# Patient Record
Sex: FEMALE | Race: WHITE | NOT HISPANIC OR LATINO | Employment: OTHER | URBAN - METROPOLITAN AREA
[De-identification: names, ages, dates, MRNs, and addresses within clinical notes are randomized per-mention and may not be internally consistent; named-entity substitution may affect disease eponyms.]

---

## 2017-02-03 ENCOUNTER — OFFICE VISIT (OUTPATIENT)
Dept: INTERNAL MEDICINE | Facility: CLINIC | Age: 62
End: 2017-02-03
Payer: COMMERCIAL

## 2017-02-03 VITALS
RESPIRATION RATE: 16 BRPM | WEIGHT: 235.8 LBS | HEART RATE: 91 BPM | SYSTOLIC BLOOD PRESSURE: 112 MMHG | HEIGHT: 65 IN | BODY MASS INDEX: 39.29 KG/M2 | TEMPERATURE: 97.5 F | OXYGEN SATURATION: 98 % | DIASTOLIC BLOOD PRESSURE: 62 MMHG

## 2017-02-03 DIAGNOSIS — I10 BENIGN ESSENTIAL HYPERTENSION: Primary | ICD-10-CM

## 2017-02-03 DIAGNOSIS — Z23 NEED FOR TDAP VACCINATION: ICD-10-CM

## 2017-02-03 DIAGNOSIS — Z13.820 SCREENING FOR OSTEOPOROSIS: ICD-10-CM

## 2017-02-03 DIAGNOSIS — Z83.3 FAMILY HISTORY OF DIABETES MELLITUS: ICD-10-CM

## 2017-02-03 DIAGNOSIS — E78.5 HYPERLIPIDEMIA LDL GOAL <130: ICD-10-CM

## 2017-02-03 LAB — HBA1C MFR BLD: 6 % (ref 4.3–6)

## 2017-02-03 PROCEDURE — 80061 LIPID PANEL: CPT | Performed by: INTERNAL MEDICINE

## 2017-02-03 PROCEDURE — 80053 COMPREHEN METABOLIC PANEL: CPT | Performed by: INTERNAL MEDICINE

## 2017-02-03 PROCEDURE — 36415 COLL VENOUS BLD VENIPUNCTURE: CPT | Performed by: INTERNAL MEDICINE

## 2017-02-03 PROCEDURE — 83036 HEMOGLOBIN GLYCOSYLATED A1C: CPT | Performed by: INTERNAL MEDICINE

## 2017-02-03 PROCEDURE — 82043 UR ALBUMIN QUANTITATIVE: CPT | Performed by: INTERNAL MEDICINE

## 2017-02-03 PROCEDURE — 90471 IMMUNIZATION ADMIN: CPT | Performed by: INTERNAL MEDICINE

## 2017-02-03 PROCEDURE — 90715 TDAP VACCINE 7 YRS/> IM: CPT | Performed by: INTERNAL MEDICINE

## 2017-02-03 PROCEDURE — 99204 OFFICE O/P NEW MOD 45 MIN: CPT | Mod: 25 | Performed by: INTERNAL MEDICINE

## 2017-02-03 RX ORDER — LISINOPRIL AND HYDROCHLOROTHIAZIDE 12.5; 2 MG/1; MG/1
1 TABLET ORAL DAILY
Qty: 90 TABLET | Refills: 3 | Status: SHIPPED | OUTPATIENT
Start: 2017-02-03 | End: 2018-01-30

## 2017-02-03 RX ORDER — AMLODIPINE BESYLATE 10 MG/1
10 TABLET ORAL DAILY
COMMUNITY
End: 2017-02-03

## 2017-02-03 RX ORDER — LATANOPROST 50 UG/ML
1 SOLUTION/ DROPS OPHTHALMIC AT BEDTIME
COMMUNITY

## 2017-02-03 RX ORDER — LISINOPRIL AND HYDROCHLOROTHIAZIDE 12.5; 2 MG/1; MG/1
1 TABLET ORAL DAILY
COMMUNITY
End: 2017-02-03

## 2017-02-03 RX ORDER — ATORVASTATIN CALCIUM 10 MG/1
10 TABLET, FILM COATED ORAL DAILY
COMMUNITY
End: 2017-02-03

## 2017-02-03 RX ORDER — AMLODIPINE BESYLATE 10 MG/1
10 TABLET ORAL DAILY
Qty: 90 TABLET | Refills: 3 | Status: SHIPPED | OUTPATIENT
Start: 2017-02-03 | End: 2018-01-30

## 2017-02-03 RX ORDER — ATORVASTATIN CALCIUM 10 MG/1
10 TABLET, FILM COATED ORAL DAILY
Qty: 90 TABLET | Refills: 3 | Status: SHIPPED | OUTPATIENT
Start: 2017-02-03 | End: 2018-01-30

## 2017-02-03 NOTE — NURSING NOTE
"Chief Complaint   Patient presents with     [facility]     New pt from [facility]- need prescription refills        Initial /62 mmHg  Pulse 91  Temp(Src) 97.5  F (36.4  C) (Oral)  Resp 16  Ht 5' 4.75\" (1.645 m)  Wt 235 lb 12.8 oz (106.958 kg)  BMI 39.53 kg/m2  SpO2 98% Estimated body mass index is 39.53 kg/(m^2) as calculated from the following:    Height as of this encounter: 5' 4.75\" (1.645 m).    Weight as of this encounter: 235 lb 12.8 oz (106.958 kg).  BP completed using cuff size: sariah Sena CMA    Please abstract the following data from this visit with this patient into the appropriate field in Epic:    Colonoscopy done on this date: Winter 2012 (approximately), by this group: Ordered by Dr Chrissy Erickson with [facility], results were Negative.   Mammogram done on this date: 02/2016 (approximately), by this group: Ordered by Dr Valerie Chacon with [facility], results were Normal.   Pap smear done on this date: 02/2016 (approximately), by this group: Dr Chacon with [facility], results were Normal.         "

## 2017-02-03 NOTE — MR AVS SNAPSHOT
"              After Visit Summary   2/3/2017    Rosita Ramirez    MRN: [id number]           Patient Information     Date Of Birth          1955        Visit Information        Provider Department      2/3/2017 9:40 AM Ewelina Rodriguez MD [facility]        Today's Diagnoses     Benign essential hypertension    -  1     Hyperlipidemia LDL goal <130         Family history of diabetes mellitus         Screening for osteoporosis            Follow-ups after your visit        Future tests that were ordered for you today     Open Future Orders        Priority Expected Expires Ordered    DX Hip/Pelvis/Spine Routine  2/3/2018 2/3/2017            Who to contact     If you have questions or need follow up information about today's clinic visit or your schedule please contact [facility] directly at [phone].  Normal or non-critical lab and imaging results will be communicated to you by AppsFlyerhart, letter or phone within 4 business days after the clinic has received the results. If you do not hear from us within 7 days, please contact the clinic through Apps[employer] or phone. If you have a critical or abnormal lab result, we will notify you by phone as soon as possible.  Submit refill requests through [employer] or call your pharmacy and they will forward the refill request to us. Please allow 3 business days for your refill to be completed.          Additional Information About Your Visit        AppsFlyerharWITOI Information     [employer]alent lets you send messages to your doctor, view your test results, renew your prescriptions, schedule appointments and more. To sign up, go to [email or web address] . Click on \"Log in\" on the left side of the screen, which will take you to the Welcome page. Then click on \"Sign up Now\" on the right side of the page.     You will be asked to enter the access code listed below, as well as some personal information. Please follow the directions to create your username and " "password.     Your access code is: [email or web address]  Expires: 2017 10:20 AM     Your access code will  in 90 days. If you need help or a new code, please call your [place] clinic or [phone].        Care EveryWhere ID     This is your Care EveryWhere ID. This could be used by other organizations to access your [place] medical records  [id number]        Your Vitals Were     Pulse Temperature Respirations Height BMI (Body Mass Index) Pulse Oximetry    91 97.5  F (36.4  C) (Oral) 16 5' 4.75\" (1.645 m) 39.53 kg/m2 98%       Blood Pressure from Last 3 Encounters:   17 112/62    Weight from Last 3 Encounters:   17 235 lb 12.8 oz (106.958 kg)              We Performed the Following     Comprehensive metabolic panel     Hemoglobin A1c     Lipid Profile with reflex to direct LDL          Where to get your medicines      These medications were sent to [facility]/pharmacy #[id number] - [place], [place] - [address]  [address], [facility] [address]     Phone:  [phone]    - amLODIPine 10 MG tablet  - atorvastatin 10 MG tablet  - lisinopril-hydrochlorothiazide 20-12.5 MG per tablet  - metFORMIN 500 MG tablet       Primary Care Provider Office Phone # Fax #    Ewelina Rodriguez [phone]       [facility] [address] [facility] [address]  [facility] [address]        Thank you!     Thank you for choosing [facility]  for your care. Our goal is always to provide you with excellent care. Hearing back from our patients is one way we can continue to improve our services. Please take a few minutes to complete the written survey that you may receive in the mail after your visit with us. Thank you!             Your Updated Medication List - Protect others around you: Learn how to safely use, store and throw away your medicines at www.disposemymeds.org.          This list is accurate as of: 2/3/17 10:20 AM.  Always use your most recent med list.                   Brand Name " Dispense Instructions for use    amLODIPine 10 MG tablet    NORVASC    90 tablet    Take 1 tablet (10 mg) by mouth daily       atorvastatin 10 MG tablet    LIPITOR    90 tablet    Take 1 tablet (10 mg) by mouth daily       latanoprost 0.005 % ophthalmic solution    XALATAN     1 drop At Bedtime       lisinopril-hydrochlorothiazide 20-12.5 MG per tablet    PRINZIDE/ZESTORETIC    90 tablet    Take 1 tablet by mouth daily       metFORMIN 500 MG tablet    GLUCOPHAGE    180 tablet    Take 1 tablet (500 mg) by mouth 2 times daily (with meals)       timolol hemihydrate 0.5 % Soln ophthalmic solution    BETIMOL     1 drop 2 times daily

## 2017-02-03 NOTE — PROGRESS NOTES
Subjective:   Rosita Ramirez is a 61 year old female who presents as a new patient to this clinic.     [facility]  Current concerns:   No specific concerns.   She needs refills and labs.   She reports she has not been diagnosed with diabetes, put on metformin as a preventative due to family history, not clear if she is considered prediabetic.   She will continue to see her gynecologist for annual exam.   Past Medical History   Diagnosis Date     Hypertension      Hyperlipidemia        Current Outpatient Prescriptions   Medication Sig Dispense Refill     latanoprost (XALATAN) 0.005 % ophthalmic solution 1 drop At Bedtime       timolol hemihydrate (BETIMOL) 0.5 % SOLN ophthalmic solution 1 drop 2 times daily       amLODIPine (NORVASC) 10 MG tablet Take 1 tablet (10 mg) by mouth daily 90 tablet 3     atorvastatin (LIPITOR) 10 MG tablet Take 1 tablet (10 mg) by mouth daily 90 tablet 3     lisinopril-hydrochlorothiazide (PRINZIDE/ZESTORETIC) 20-12.5 MG per tablet Take 1 tablet by mouth daily 90 tablet 3     metFORMIN (GLUCOPHAGE) 500 MG tablet Take 1 tablet (500 mg) by mouth 2 times daily (with meals) 180 tablet 3     [DISCONTINUED] amLODIPine (NORVASC) 10 MG tablet Take 10 mg by mouth daily       [DISCONTINUED] metFORMIN (GLUCOPHAGE) 500 MG tablet Take 500 mg by mouth 2 times daily (with meals)       [DISCONTINUED] lisinopril-hydrochlorothiazide (PRINZIDE/ZESTORETIC) 20-12.5 MG per tablet Take 1 tablet by mouth daily       [DISCONTINUED] atorvastatin (LIPITOR) 10 MG tablet Take 10 mg by mouth daily         Past Surgical History   Procedure Laterality Date     Appendectomy       Extraction(s) dental         Family History   Problem Relation Age of Onset     Type 2 Diabetes Father      Heart Surgery Brother 50     Bypass      CEREBROVASCULAR DISEASE Maternal Grandmother      KIDNEY DISEASE Mother 80       Social History     Social History     Marital Status:      Spouse Name: N/A      "Number of Children: 3     Years of Education: N/A     Occupational History     Not on file.     Social History Main Topics     Smoking status: Never Smoker      Smokeless tobacco: Not on file     Alcohol Use: No     Drug Use: No     Sexual Activity: No     Other Topics Concern     Not on file     Social History Narrative     No narrative on file     ROS:   GENERAL: negative for, fever, chills, she has been losing weight deliberately  EYES: negative  ENT: negative  RESPIRATORY: No dyspnea on exertion  CARDIOVASCULAR: negative  GI: negative  : negative  MUSCULOSKELETAL: left knee stiff, some soreness  NEUROLOGIC: negative  SKIN: negative  ENDOCRINE: negative    Objective:  Patient alert in NAD  /62 mmHg  Pulse 91  Temp(Src) 97.5  F (36.4  C) (Oral)  Resp 16  Ht 5' 4.75\" (1.645 m)  Wt 235 lb 12.8 oz (106.958 kg)  BMI 39.53 kg/m2  SpO2 98%     Neck: no nodes, no thyromegaly  Lungs: clear  CV: normal S1, S2 without murmur, S3 or S4.   Abdomen: Bowel sounds normal, soft, nontender. No hepatosplenomegaly. No masses.   No edema  Pulses full, no carotid bruits         ASSESSMENT:   (I10) Benign essential hypertension  (primary encounter diagnosis)  Comment: controlled, continue meds, lab  Plan: amLODIPine (NORVASC) 10 MG tablet,         lisinopril-hydrochlorothiazide         (PRINZIDE/ZESTORETIC) 20-12.5 MG per tablet,         Comprehensive metabolic panel            (E78.5) Hyperlipidemia LDL goal <130  Comment: check labs  Plan: atorvastatin (LIPITOR) 10 MG tablet,         Comprehensive metabolic panel, Lipid Profile         with reflex to direct LDL            (Z83.3) Family history of diabetes mellitus  Comment: check labs  Plan: metFORMIN (GLUCOPHAGE) 500 MG tablet,         Comprehensive metabolic panel, Hemoglobin A1c            (Z13.820) Screening for osteoporosis  Comment: due  Plan: DX Hip/Pelvis/Spine            (Z23) Need for Tdap vaccination  Comment: due  Plan: TDAP (ADACEL AGES 11-64)       "     Will get records sent.     Ewelina Rodriguez MD  [facility]          Please abstract the following data from this visit with this patient into the appropriate field in Epic:    Colonoscopy done on this date: Winter 2012 (approximately), by this group: Ordered by Dr Chrissy Erickson with [facility], results were Negative.   Mammogram done on this date: 02/2016 (approximately), by this group: Ordered by Dr Valerie Chacon with [facility], results were Normal.   Pap smear done on this date: 02/2016 (approximately), by this group: Dr Chacon with [facility], results were Normal.

## 2017-02-03 NOTE — Clinical Note
Please abstract the following data from this visit with this patient into the appropriate field in Epic:  Colonoscopy done on this date: Winter 2012 (approximately), by this group: Ordered by Dr Chrissy Erickson with [facility], results were Negative.  Mammogram done on this date: 02/2016 (approximately), by this group: Ordered by Dr Valerie Chacon with [facility], results were Normal.  Pap smear done on this date: 02/2016 (approximately), by this group: Dr Chaocn with [facility], results were Normal.

## 2017-02-04 LAB
ALBUMIN SERPL-MCNC: 3.9 G/DL (ref 3.4–5)
ALP SERPL-CCNC: 76 U/L (ref 40–150)
ALT SERPL W P-5'-P-CCNC: 35 U/L (ref 0–50)
ANION GAP SERPL CALCULATED.3IONS-SCNC: 11 MMOL/L (ref 3–14)
AST SERPL W P-5'-P-CCNC: 17 U/L (ref 0–45)
BILIRUB SERPL-MCNC: 0.6 MG/DL (ref 0.2–1.3)
BUN SERPL-MCNC: 13 MG/DL (ref 7–30)
CALCIUM SERPL-MCNC: 9.4 MG/DL (ref 8.5–10.1)
CHLORIDE SERPL-SCNC: 103 MMOL/L (ref 94–109)
CHOLEST SERPL-MCNC: 149 MG/DL
CO2 SERPL-SCNC: 27 MMOL/L (ref 20–32)
CREAT SERPL-MCNC: 0.59 MG/DL (ref 0.52–1.04)
CREAT UR-MCNC: 60 MG/DL
GFR SERPL CREATININE-BSD FRML MDRD: ABNORMAL ML/MIN/1.7M2
GLUCOSE SERPL-MCNC: 105 MG/DL (ref 70–99)
HDLC SERPL-MCNC: 84 MG/DL
LDLC SERPL CALC-MCNC: 51 MG/DL
MICROALBUMIN UR-MCNC: <5 MG/L
MICROALBUMIN/CREAT UR: NORMAL MG/G CR (ref 0–25)
NONHDLC SERPL-MCNC: 65 MG/DL
POTASSIUM SERPL-SCNC: 4.3 MMOL/L (ref 3.4–5.3)
PROT SERPL-MCNC: 7.9 G/DL (ref 6.8–8.8)
SODIUM SERPL-SCNC: 141 MMOL/L (ref 133–144)
TRIGL SERPL-MCNC: 70 MG/DL

## 2017-03-09 ENCOUNTER — TELEPHONE (OUTPATIENT)
Dept: BONE DENSITY | Facility: CLINIC | Age: 62
End: 2017-03-09

## 2017-06-05 ENCOUNTER — TELEPHONE (OUTPATIENT)
Dept: INTERNAL MEDICINE | Facility: CLINIC | Age: 62
End: 2017-06-05

## 2017-06-05 DIAGNOSIS — Z83.3 FAMILY HISTORY OF DIABETES MELLITUS: ICD-10-CM

## 2017-06-05 NOTE — TELEPHONE ENCOUNTER
Sorry, at the first visit the nurse did not put in the correct dose. In future, please cue up corrected dose on prescriptions. Sent.

## 2017-06-05 NOTE — TELEPHONE ENCOUNTER
Patient calls in stating she takes Metformin 2 tablets twice daily, previous Rx was written for 1 tablet twice daily. Patient is out of medication after today.    Does not monitor BG states is on Metformin for preventative d/t Family HX.  Patient asks for a 1 year supply.  Please call pt when Rx is sent to pharmacy.  Provider please review and advise.

## 2018-01-30 ENCOUNTER — OFFICE VISIT (OUTPATIENT)
Dept: INTERNAL MEDICINE | Facility: CLINIC | Age: 63
End: 2018-01-30
Payer: COMMERCIAL

## 2018-01-30 VITALS
HEART RATE: 82 BPM | OXYGEN SATURATION: 99 % | DIASTOLIC BLOOD PRESSURE: 68 MMHG | RESPIRATION RATE: 16 BRPM | BODY MASS INDEX: 40.38 KG/M2 | WEIGHT: 240.8 LBS | SYSTOLIC BLOOD PRESSURE: 108 MMHG | TEMPERATURE: 98.7 F

## 2018-01-30 DIAGNOSIS — I10 BENIGN ESSENTIAL HYPERTENSION: Primary | ICD-10-CM

## 2018-01-30 DIAGNOSIS — E66.01 MORBID OBESITY (H): ICD-10-CM

## 2018-01-30 DIAGNOSIS — R73.09 ABNORMAL BLOOD SUGAR: ICD-10-CM

## 2018-01-30 DIAGNOSIS — Z12.31 ENCOUNTER FOR SCREENING MAMMOGRAM FOR BREAST CANCER: ICD-10-CM

## 2018-01-30 DIAGNOSIS — Z11.59 SCREENING FOR VIRAL DISEASE: ICD-10-CM

## 2018-01-30 DIAGNOSIS — L82.1 SEBORRHEIC KERATOSIS: ICD-10-CM

## 2018-01-30 DIAGNOSIS — E78.5 HYPERLIPIDEMIA LDL GOAL <130: ICD-10-CM

## 2018-01-30 LAB — HBA1C MFR BLD: 5.9 % (ref 4.3–6)

## 2018-01-30 PROCEDURE — 82043 UR ALBUMIN QUANTITATIVE: CPT | Performed by: INTERNAL MEDICINE

## 2018-01-30 PROCEDURE — 36415 COLL VENOUS BLD VENIPUNCTURE: CPT | Performed by: INTERNAL MEDICINE

## 2018-01-30 PROCEDURE — 80048 BASIC METABOLIC PNL TOTAL CA: CPT | Performed by: INTERNAL MEDICINE

## 2018-01-30 PROCEDURE — 80061 LIPID PANEL: CPT | Performed by: INTERNAL MEDICINE

## 2018-01-30 PROCEDURE — 86803 HEPATITIS C AB TEST: CPT | Performed by: INTERNAL MEDICINE

## 2018-01-30 PROCEDURE — 83036 HEMOGLOBIN GLYCOSYLATED A1C: CPT | Performed by: INTERNAL MEDICINE

## 2018-01-30 PROCEDURE — 99214 OFFICE O/P EST MOD 30 MIN: CPT | Performed by: INTERNAL MEDICINE

## 2018-01-30 RX ORDER — AMLODIPINE BESYLATE 10 MG/1
10 TABLET ORAL DAILY
Qty: 90 TABLET | Refills: 3 | Status: SHIPPED | OUTPATIENT
Start: 2018-01-30 | End: 2019-01-24

## 2018-01-30 RX ORDER — LISINOPRIL AND HYDROCHLOROTHIAZIDE 12.5; 2 MG/1; MG/1
1 TABLET ORAL DAILY
Qty: 90 TABLET | Refills: 3 | Status: SHIPPED | OUTPATIENT
Start: 2018-01-30 | End: 2019-01-24

## 2018-01-30 RX ORDER — ATORVASTATIN CALCIUM 10 MG/1
10 TABLET, FILM COATED ORAL DAILY
Qty: 90 TABLET | Refills: 3 | Status: SHIPPED | OUTPATIENT
Start: 2018-01-30 | End: 2019-02-01

## 2018-01-30 NOTE — PROGRESS NOTES
SUBJECTIVE:   Rosita Ramirez is a 62 year old female who presents to clinic today for the following health issues:      Hyperlipidemia Follow-Up      Rate your low fat/cholesterol diet?: fair    Taking statin?  Yes, no muscle aches from statin    Other lipid medications/supplements?:  Fish oil/Omega 3, dose  without side effects    Hypertension Follow-up      Outpatient blood pressures are not being checked.    Low Salt Diet: not monitoring salt      Amount of exercise or physical activity: None    Problems taking medications regularly: No    Medication side effects: none    Diet: regular (no restrictions)      Other problems:   1. Abnormal sugar: on metformin, tolerating well  2. Morbid obesity; weight increased    Current Concerns:   Skin lesion right breast present a long time, itches at times.     Patient Active Problem List   Diagnosis     Benign essential hypertension     Hyperlipidemia LDL goal <130     Family history of diabetes mellitus       Current Outpatient Prescriptions   Medication Sig Dispense Refill     metFORMIN (GLUCOPHAGE) 500 MG tablet Take 2 tablets (1,000 mg) by mouth 2 times daily (with meals) 360 tablet 3     latanoprost (XALATAN) 0.005 % ophthalmic solution 1 drop At Bedtime       timolol hemihydrate (BETIMOL) 0.5 % SOLN ophthalmic solution 1 drop 2 times daily       amLODIPine (NORVASC) 10 MG tablet Take 1 tablet (10 mg) by mouth daily 90 tablet 3     atorvastatin (LIPITOR) 10 MG tablet Take 1 tablet (10 mg) by mouth daily 90 tablet 3     lisinopril-hydrochlorothiazide (PRINZIDE/ZESTORETIC) 20-12.5 MG per tablet Take 1 tablet by mouth daily 90 tablet 3         Social History   Substance Use Topics     Smoking status: Never Smoker     Smokeless tobacco: Never Used     Alcohol use No          Reviewed and updated as needed this visit by clinical staff       Reviewed and updated as needed this visit by Provider         ROS:  No fever, chills, no dyspnea on exertion, no chest pain,  "palpitations, PND, orthopnea, edema, syncope, headache, abdominal pain     OBJECTIVE:     /68  Pulse 82  Temp 98.7  F (37.1  C) (Oral)  Resp 16  Wt 240 lb 12.8 oz (109.2 kg)  SpO2 99%  BMI 40.38 kg/m2  Body mass index is 40.38 kg/(m^2).    Right breast with large seb k  Lungs: clear  CV: normal S1, S2 without murmur, S3 or S4.   Abdomen: Bowel sounds normal, soft, nontender. No hepatosplenomegaly. No masses.   No edema  Pulses full, no carotid bruits        ASSESSMENT/PLAN:         BMI:   Estimated body mass index is 40.38 kg/(m^2) as calculated from the following:    Height as of 2/3/17: 5' 4.75\" (1.645 m).    Weight as of this encounter: 240 lb 12.8 oz (109.2 kg).   Weight management plan: Discussed healthy diet and exercise guidelines and patient will follow up in 6 months in clinic to re-evaluate.      1. Benign essential hypertension  Well controlled  - Basic metabolic panel  - Albumin Random Urine Quantitative with Creat Ratio  - amLODIPine (NORVASC) 10 MG tablet; Take 1 tablet (10 mg) by mouth daily  Dispense: 90 tablet; Refill: 3  - lisinopril-hydrochlorothiazide (PRINZIDE/ZESTORETIC) 20-12.5 MG per tablet; Take 1 tablet by mouth daily  Dispense: 90 tablet; Refill: 3    2. Morbid obesity (H)  Increased weight, discussed how to make diet and exercise changes    3. Hyperlipidemia LDL goal <130  Recheck due  - Lipid panel reflex to direct LDL Fasting  - atorvastatin (LIPITOR) 10 MG tablet; Take 1 tablet (10 mg) by mouth daily  Dispense: 90 tablet; Refill: 3    4. Abnormal blood sugar  Recheck on med  - Hemoglobin A1c  - metFORMIN (GLUCOPHAGE) 500 MG tablet; Take 2 tablets (1,000 mg) by mouth 2 times daily (with meals)  Dispense: 360 tablet; Refill: 3    5. Screening for viral disease  due  - Hepatitis C Screen Reflex to HCV RNA Quant and Genotype    6. Encounter for screening mammogram for breast cancer  due  - MA Screening Digital Bilateral; Future    7. Valentín K; reassured benign, can use HC cream to " help with itch, can freeze with otc wart kit if she wants it removed.     Ewelina Rodriguez MD  [facility]

## 2018-01-30 NOTE — NURSING NOTE
"Chief Complaint   Patient presents with     Recheck Medication     LOV 02/03/2017: HTN and LDL- medication refills requesting 90 days      Derm Problem     Mole on breast itchess occasionally and increased in size       Initial /68  Pulse 82  Temp 98.7  F (37.1  C) (Oral)  Resp 16  Wt 240 lb 12.8 oz (109.2 kg)  SpO2 99%  BMI 40.38 kg/m2 Estimated body mass index is 40.38 kg/(m^2) as calculated from the following:    Height as of 2/3/17: 5' 4.75\" (1.645 m).    Weight as of this encounter: 240 lb 12.8 oz (109.2 kg).  Medication Reconciliation: kt Sena CMA      Discussed Health Maintenance:    Patient agreed to schedule mammogram. Order have been place and information given to patient.       "

## 2018-01-30 NOTE — MR AVS SNAPSHOT
After Visit Summary   1/30/2018    Rosita Ramirez    MRN: [id number]           Patient Information     Date Of Birth          1955        Visit Information        Provider Department      1/30/2018 9:00 AM Ewelina Rodriguez MD [facility]        Today's Diagnoses     Benign essential hypertension    -  1    Morbid obesity (H)        Hyperlipidemia LDL goal <130        Abnormal blood sugar        Seborrheic keratosis        Screening for viral disease        Encounter for screening mammogram for breast cancer           Follow-ups after your visit        Future tests that were ordered for you today     Open Future Orders        Priority Expected Expires Ordered    MA Screening Digital Bilateral Routine  1/30/2019 1/30/2018            Who to contact     If you have questions or need follow up information about today's clinic visit or your schedule please contact [facility] directly at [phone].  Normal or non-critical lab and imaging results will be communicated to you by MyChart, letter or phone within 4 business days after the clinic has received the results. If you do not hear from us within 7 days, please contact the clinic through [employer] or phone. If you have a critical or abnormal lab result, we will notify you by phone as soon as possible.  Submit refill requests through [employer] or call your pharmacy and they will forward the refill request to us. Please allow 3 business days for your refill to be completed.          Additional Information About Your Visit        MyChart Information     [employer] gives you secure access to your electronic health record. If you see a primary care provider, you can also send messages to your care team and make appointments. If you have questions, please call your primary care clinic.  If you do not have a primary care provider, please call [phone] and they will assist you.        Care EveryWhere ID     This is your Care  EveryWhere ID. This could be used by other organizations to access your [facility] medical records  [id number]        Your Vitals Were     Pulse Temperature Respirations Pulse Oximetry BMI (Body Mass Index)       82 98.7  F (37.1  C) (Oral) 16 99% 40.38 kg/m2        Blood Pressure from Last 3 Encounters:   01/30/18 108/68   02/03/17 112/62    Weight from Last 3 Encounters:   01/30/18 240 lb 12.8 oz (109.2 kg)   02/03/17 235 lb 12.8 oz (107 kg)              We Performed the Following     Albumin Random Urine Quantitative with Creat Ratio     Basic metabolic panel     Hemoglobin A1c     Hepatitis C Screen Reflex to HCV RNA Quant and Genotype     Lipid panel reflex to direct LDL Fasting          Where to get your medicines      These medications were sent to [facility] [id number] [place] [address] [address] AT [facility] [address]  [address], [facility] [address]     Phone:  [phone]     amLODIPine 10 MG tablet    atorvastatin 10 MG tablet    lisinopril-hydrochlorothiazide 20-12.5 MG per tablet    metFORMIN 500 MG tablet          Primary Care Provider Office Phone # Fax #    Ewelina Rodriguez [phone]       [address]  [facility] [address]        Equal Access to Services     ARCELIA GOLDBERG AH: Hadii carina ku hadasho Soomaali, waaxda luqadaha, qaybta kaalmada adeegyada, waxay idiin hayrohinin newton reyna. So [facility] [phone].    ATENCIÓN: Si habla español, tiene a mas disposición servicios gratuitos de asistencia lingüística. Llame al [phone].    We comply with applicable federal civil rights laws and [place] laws. We do not discriminate on the basis of race, color, national origin, age, disability, sex, sexual orientation, or gender identity.            Thank you!     Thank you for choosing [facility]  for your care. Our goal is always to provide you with excellent care. Hearing back from our patients is one way we can continue to  improve our services. Please take a few minutes to complete the written survey that you may receive in the mail after your visit with us. Thank you!             Your Updated Medication List - Protect others around you: Learn how to safely use, store and throw away your medicines at www.disposemymeds.org.          This list is accurate as of 1/30/18  9:27 AM.  Always use your most recent med list.                   Brand Name Dispense Instructions for use Diagnosis    amLODIPine 10 MG tablet    NORVASC    90 tablet    Take 1 tablet (10 mg) by mouth daily    Benign essential hypertension       atorvastatin 10 MG tablet    LIPITOR    90 tablet    Take 1 tablet (10 mg) by mouth daily    Hyperlipidemia LDL goal <130       latanoprost 0.005 % ophthalmic solution    XALATAN     1 drop At Bedtime        lisinopril-hydrochlorothiazide 20-12.5 MG per tablet    PRINZIDE/ZESTORETIC    90 tablet    Take 1 tablet by mouth daily    Benign essential hypertension       metFORMIN 500 MG tablet    GLUCOPHAGE    360 tablet    Take 2 tablets (1,000 mg) by mouth 2 times daily (with meals)    Abnormal blood sugar       timolol hemihydrate 0.5 % Soln ophthalmic solution    BETIMOL     1 drop 2 times daily

## 2018-01-31 LAB
ANION GAP SERPL CALCULATED.3IONS-SCNC: 10 MMOL/L (ref 3–14)
BUN SERPL-MCNC: 15 MG/DL (ref 7–30)
CALCIUM SERPL-MCNC: 8.9 MG/DL (ref 8.5–10.1)
CHLORIDE SERPL-SCNC: 103 MMOL/L (ref 94–109)
CHOLEST SERPL-MCNC: 128 MG/DL
CO2 SERPL-SCNC: 25 MMOL/L (ref 20–32)
CREAT SERPL-MCNC: 0.62 MG/DL (ref 0.52–1.04)
CREAT UR-MCNC: 125 MG/DL
GFR SERPL CREATININE-BSD FRML MDRD: >90 ML/MIN/1.7M2
GLUCOSE SERPL-MCNC: 109 MG/DL (ref 70–99)
HCV AB SERPL QL IA: NONREACTIVE
HDLC SERPL-MCNC: 81 MG/DL
LDLC SERPL CALC-MCNC: 32 MG/DL
MICROALBUMIN UR-MCNC: 10 MG/L
MICROALBUMIN/CREAT UR: 7.62 MG/G CR (ref 0–25)
NONHDLC SERPL-MCNC: 47 MG/DL
POTASSIUM SERPL-SCNC: 4.3 MMOL/L (ref 3.4–5.3)
SODIUM SERPL-SCNC: 138 MMOL/L (ref 133–144)
TRIGL SERPL-MCNC: 73 MG/DL

## 2018-02-02 ENCOUNTER — HOSPITAL ENCOUNTER (OUTPATIENT)
Dept: MAMMOGRAPHY | Facility: CLINIC | Age: 63
Discharge: HOME | End: 2018-02-02
Attending: INTERNAL MEDICINE | Admitting: INTERNAL MEDICINE
Payer: COMMERCIAL

## 2018-02-02 DIAGNOSIS — Z12.31 ENCOUNTER FOR SCREENING MAMMOGRAM FOR BREAST CANCER: ICD-10-CM

## 2018-02-02 PROCEDURE — 77067 SCR MAMMO BI INCL CAD: CPT

## 2018-02-08 ENCOUNTER — TELEPHONE (OUTPATIENT)
Dept: INTERNAL MEDICINE | Facility: CLINIC | Age: 63
End: 2018-02-08

## 2018-02-08 NOTE — TELEPHONE ENCOUNTER
Panel Management Review      Patient has the following on her problem list:     Hypertension   Last three blood pressure readings:  BP Readings from Last 3 Encounters:   01/30/18 108/68   02/03/17 112/62     Blood pressure: Passed    HTN Guidelines:  Age 18-59 BP range:  Less than 140/90  Age 60-85 with Diabetes:  Less than 140/90  Age 60-85 without Diabetes:  less than 150/90      Composite cancer screening  Chart review shows that this patient is due/due soon for the following Mammogram  Summary:    Patient is due/failing the following:   MAMMOGRAM    Action needed:   Patient needs referral/order: Mammo    Type of outreach:    No action req- discuss HM with pt at OV on 01/30/2018- pt agrees to schedule, orders placed.      Questions for provider review:    fabio Sena CMA       Chart routed to closed .

## 2018-10-30 ENCOUNTER — OFFICE VISIT (OUTPATIENT)
Dept: INTERNAL MEDICINE | Facility: CLINIC | Age: 63
End: 2018-10-30
Payer: COMMERCIAL

## 2018-10-30 VITALS
DIASTOLIC BLOOD PRESSURE: 68 MMHG | HEART RATE: 83 BPM | BODY MASS INDEX: 40.65 KG/M2 | OXYGEN SATURATION: 95 % | HEIGHT: 65 IN | WEIGHT: 244 LBS | RESPIRATION RATE: 14 BRPM | SYSTOLIC BLOOD PRESSURE: 108 MMHG | TEMPERATURE: 98.4 F

## 2018-10-30 DIAGNOSIS — E78.5 HYPERLIPIDEMIA LDL GOAL <130: ICD-10-CM

## 2018-10-30 DIAGNOSIS — Z01.818 PREOP GENERAL PHYSICAL EXAM: Primary | ICD-10-CM

## 2018-10-30 DIAGNOSIS — I10 BENIGN ESSENTIAL HYPERTENSION: ICD-10-CM

## 2018-10-30 DIAGNOSIS — R73.09 ABNORMAL BLOOD SUGAR: ICD-10-CM

## 2018-10-30 DIAGNOSIS — E66.01 MORBID OBESITY (H): ICD-10-CM

## 2018-10-30 DIAGNOSIS — H25.9 AGE-RELATED CATARACT OF BOTH EYES, UNSPECIFIED AGE-RELATED CATARACT TYPE: ICD-10-CM

## 2018-10-30 PROCEDURE — 80053 COMPREHEN METABOLIC PANEL: CPT | Performed by: INTERNAL MEDICINE

## 2018-10-30 PROCEDURE — 36415 COLL VENOUS BLD VENIPUNCTURE: CPT | Performed by: INTERNAL MEDICINE

## 2018-10-30 PROCEDURE — 99214 OFFICE O/P EST MOD 30 MIN: CPT | Performed by: INTERNAL MEDICINE

## 2018-10-30 NOTE — MR AVS SNAPSHOT
After Visit Summary   10/30/2018    Rosita Ramirez    MRN: [id number]           Patient Information     Date Of Birth          1955        Visit Information        Provider Department      10/30/2018 1:40 PM Kaiser Cartwright MD [facility]        Today's Diagnoses     Preop general physical exam    -  1    Age-related cataract of both eyes, unspecified age-related cataract type        Benign essential hypertension        Hyperlipidemia LDL goal <130        Morbid obesity (H)        Abnormal blood sugar          Care Instructions      Before Your Surgery      Call your surgeon if there is any change in your health. This includes signs of a cold or flu (such as a sore throat, runny nose, cough, rash or fever).    Do not smoke, drink alcohol or take over the counter medicine (unless your surgeon or primary care doctor tells you to) for the 24 hours before and after surgery.    If you take prescribed drugs: Follow your doctor s orders about which medicines to take and which to stop until after surgery.    Eating and drinking prior to surgery: follow the instructions from your surgeon    Take a shower or bath the night before surgery. Use the soap your surgeon gave you to gently clean your skin. If you do not have soap from your surgeon, use your regular soap. Do not shave or scrub the surgery site.  Wear clean pajamas and have clean sheets on your bed.           Follow-ups after your visit        Who to contact     If you have questions or need follow up information about today's clinic visit or your schedule please contact [facility] directly at [phone].  Normal or non-critical lab and imaging results will be communicated to you by MyChart, letter or phone within 4 business days after the clinic has received the results. If you do not hear from us within 7 days, please contact the clinic through MyChart or phone. If you have a critical or abnormal lab  "result, we will notify you by phone as soon as possible.  Submit refill requests through [employer] or call your pharmacy and they will forward the refill request to us. Please allow 3 business days for your refill to be completed.          Additional Information About Your Visit        [employer] Information     [employer] gives you secure access to your electronic health record. If you see a primary care provider, you can also send messages to your care team and make appointments. If you have questions, please call your primary care clinic.  If you do not have a primary care provider, please call [phone] and they will assist you.        Care EveryWhere ID     This is your Care EveryWhere ID. This could be used by other organizations to access your [facility] medical records  [id number]        Your Vitals Were     Pulse Temperature Respirations Height Pulse Oximetry Breastfeeding?    83 98.4  F (36.9  C) (Oral) 14 5' 4.75\" (1.645 m) 95% No    BMI (Body Mass Index)                   40.92 kg/m2            Blood Pressure from Last 3 Encounters:   10/30/18 108/68   01/30/18 108/68   02/03/17 112/62    Weight from Last 3 Encounters:   10/30/18 244 lb (110.7 kg)   01/30/18 240 lb 12.8 oz (109.2 kg)   02/03/17 235 lb 12.8 oz (107 kg)              We Performed the Following     Comprehensive metabolic panel        Primary Care Provider Office Phone # Fax #    Ewelina Rodirguez [phone]       [address] [facility] [address]  [facility] [address]        Equal Access to Services     [facility] AH: Hadii aad ku hadasho Soomaali, waaxda luqadaha, qaybta kaalmada adeegyada, tk reyna. So [facility] [phone].    ATENCIÓN: Si habla español, tiene a mas disposición servicios gratuitos de asistencia lingüística. Llame al [phone].    We comply with applicable federal civil rights laws and [place] laws. We do not discriminate on the basis of race, color, national origin, age, disability, sex, " sexual orientation, or gender identity.            Thank you!     Thank you for choosing [facility]  for your care. Our goal is always to provide you with excellent care. Hearing back from our patients is one way we can continue to improve our services. Please take a few minutes to complete the written survey that you may receive in the mail after your visit with us. Thank you!             Your Updated Medication List - Protect others around you: Learn how to safely use, store and throw away your medicines at www.disposemymeds.org.          This list is accurate as of 10/30/18  2:25 PM.  Always use your most recent med list.                   Brand Name Dispense Instructions for use Diagnosis    amLODIPine 10 MG tablet    NORVASC    90 tablet    Take 1 tablet (10 mg) by mouth daily    Benign essential hypertension       atorvastatin 10 MG tablet    LIPITOR    90 tablet    Take 1 tablet (10 mg) by mouth daily    Hyperlipidemia LDL goal <130       latanoprost 0.005 % ophthalmic solution    XALATAN     1 drop At Bedtime        lisinopril-hydrochlorothiazide 20-12.5 MG per tablet    PRINZIDE/ZESTORETIC    90 tablet    Take 1 tablet by mouth daily    Benign essential hypertension       metFORMIN 500 MG tablet    GLUCOPHAGE    360 tablet    Take 2 tablets (1,000 mg) by mouth 2 times daily (with meals)    Abnormal blood sugar       timolol hemihydrate 0.5 % Soln ophthalmic solution    BETIMOL     1 drop 2 times daily

## 2018-10-30 NOTE — PROGRESS NOTES
[place] [address]  [facility] [address]  [phone]  Dept: [phone]    PRE-OP EVALUATION:  Today's date: 10/30/2018    Rosita Ramirez (: 1955) presents for pre-operative evaluation assessment as requested by Dr. Patrick Riedel.  She requires evaluation and anesthesia risk assessment prior to undergoing surgery/procedure for treatment of cataract, right eye .    Fax number for surgical facility: [phone]  Primary Physician: Ewelina Rodriguez  Type of Anesthesia Anticipated: to be determined    Patient has a Health Care Directive or Living Will:  YES     Preop Questions 10/30/2018   Who is doing your surgery? p j riedel   What are you having done? cataract   Date of Surgery/Procedure: 2018   1.  Do you have a history of Heart attack, stroke, stent, coronary bypass surgery, or other heart surgery? No   2.  Do you ever have any pain or discomfort in your chest? No   3.  Do you have a history of  Heart Failure? No   4.   Are you troubled by shortness of breath when:  walking on a level surface, or up a slight hill, or at night? No   5.  Do you currently have a cold, bronchitis or other respiratory infection? No   6.  Do you have a cough, shortness of breath, or wheezing? No   7.  Do you sometimes get pains in the calves of your legs when you walk? No   8. Do you or anyone in your family have previous history of blood clots? No   9.  Do you or does anyone in your family have a serious bleeding problem such as prolonged bleeding following surgeries or cuts? No   10. Have you ever had problems with anemia or been told to take iron pills? No   11. Have you had any abnormal blood loss such as black, tarry or bloody stools, or abnormal vaginal bleeding? No   12. Have you ever had a blood transfusion? No   13. Have you or any of your relatives ever had problems with anesthesia? No   14. Do you have sleep apnea, excessive snoring or daytime drowsiness? No   15. Do  you have any prosthetic heart valves? No   16. Do you have prosthetic joints? No   17. Is there any chance that you may be pregnant? No         HPI:     HPI related to upcoming procedure: scheduled for eye cataract surgery.   No acute complaints, no medication change or new medical conditions.  Has h/o HTN. on medical treatment. BP has been controlled. No side effects from medications. No CP, HA, dizziness. good compliance with medications and low salt diet.  Has H/O hyperlipidemia. On medical treatment and diet. No side effects. No muscle weakness, myalgias or upset stomach.   Has H/O DM. On diet , exercise and PO Metformin. Controlled, last HgbA1C 5.9. No parestesias. No symptoms of hypoglycemias.        See problem list for active medical problems.  Problems all longstanding and stable, except as noted/documented.  See ROS for pertinent symptoms related to these conditions.                                                                                                                                                          .    MEDICAL HISTORY:     Patient Active Problem List    Diagnosis Date Noted     Abnormal blood sugar 01/30/2018     Priority: Medium     Morbid obesity (H) 01/30/2018     Priority: Medium     Benign essential hypertension 02/03/2017     Priority: Medium     Hyperlipidemia LDL goal <130 02/03/2017     Priority: Medium     Family history of diabetes mellitus 02/03/2017     Priority: Medium      Past Medical History:   Diagnosis Date     Hyperlipidemia      Hypertension      Past Surgical History:   Procedure Laterality Date     APPENDECTOMY       EXTRACTION(S) DENTAL       Current Outpatient Prescriptions   Medication Sig Dispense Refill     amLODIPine (NORVASC) 10 MG tablet Take 1 tablet (10 mg) by mouth daily 90 tablet 3     atorvastatin (LIPITOR) 10 MG tablet Take 1 tablet (10 mg) by mouth daily 90 tablet 3     latanoprost (XALATAN) 0.005 % ophthalmic solution 1 drop At Bedtime        "lisinopril-hydrochlorothiazide (PRINZIDE/ZESTORETIC) 20-12.5 MG per tablet Take 1 tablet by mouth daily 90 tablet 3     metFORMIN (GLUCOPHAGE) 500 MG tablet Take 2 tablets (1,000 mg) by mouth 2 times daily (with meals) 360 tablet 3     timolol hemihydrate (BETIMOL) 0.5 % SOLN ophthalmic solution 1 drop 2 times daily       OTC products: None, except as noted above    No Known Allergies   Latex Allergy: NO    Social History   Substance Use Topics     Smoking status: Never Smoker     Smokeless tobacco: Never Used     Alcohol use No     History   Drug Use No       REVIEW OF SYSTEMS:   CONSTITUTIONAL: NEGATIVE for fever, chills, change in weight  INTEGUMENTARY/SKIN: NEGATIVE for worrisome rashes, moles or lesions  EYES: NEGATIVE for vision changes or irritation  ENT/MOUTH: NEGATIVE for ear, mouth and throat problems  RESP: NEGATIVE for significant cough or SOB  BREAST: NEGATIVE for masses, tenderness or discharge  CV: NEGATIVE for chest pain, palpitations or peripheral edema  GI: NEGATIVE for nausea, abdominal pain, heartburn, or change in bowel habits  : NEGATIVE for frequency, dysuria, or hematuria  MUSCULOSKELETAL: NEGATIVE for significant arthralgias or myalgia  NEURO: NEGATIVE for weakness, dizziness or paresthesias  ENDOCRINE: NEGATIVE for temperature intolerance, skin/hair changes  HEME: NEGATIVE for bleeding problems  PSYCHIATRIC: NEGATIVE for changes in mood or affect    EXAM:   /68 (BP Location: Left arm, Patient Position: Sitting, Cuff Size: Adult Large)  Pulse 83  Temp 98.4  F (36.9  C) (Oral)  Resp 14  Ht 5' 4.75\" (1.645 m)  Wt 244 lb (110.7 kg)  SpO2 95%  Breastfeeding? No  BMI 40.92 kg/m2    GENERAL APPEARANCE: healthy, alert and no distress     EYES: EOMI, PERRL     HENT: ear canals and TM's normal and nose and mouth without ulcers or lesions     NECK: no adenopathy, no asymmetry, masses, or scars and thyroid normal to palpation     RESP: lungs clear to auscultation - no rales, rhonchi or " wheezes     CV: regular rates and rhythm, normal S1 S2, no S3 or S4 and no murmur, click or rub     ABDOMEN:  soft, nontender, no HSM or masses and bowel sounds normal     MS: extremities normal- no gross deformities noted, no evidence of inflammation in joints, FROM in all extremities.     SKIN: no suspicious lesions or rashes     NEURO: Normal strength and tone, sensory exam grossly normal, mentation intact and speech normal     PSYCH: mentation appears normal. and affect normal/bright     LYMPHATICS: No cervical adenopathy    DIAGNOSTICS:     Labs Drawn and in Process:   Unresulted Labs Ordered in the Past 30 Days of this Admission     Date and Time Order Name Status Description    10/30/2018 1408 COMPREHENSIVE METABOLIC PANEL In process           Recent Labs   Lab Test  01/30/18   0925  02/03/17   1032   NA  138  141   POTASSIUM  4.3  4.3   CR  0.62  0.59   A1C  5.9  6.0        IMPRESSION:   Reason for surgery/procedure: eye cataracts   Diagnosis/reason for consult: preoperative evaluation/ clearance      The proposed surgical procedure is considered LOW risk.    REVISED CARDIAC RISK INDEX  The patient has the following serious cardiovascular risks for perioperative complications such as (MI, PE, VFib and 3  AV Block):  No serious cardiac risks  INTERPRETATION: 0 risks: Class I (very low risk - 0.4% complication rate)    The patient has the following additional risks for perioperative complications:  No identified additional risks      ICD-10-CM    1. Preop general physical exam Z01.818        RECOMMENDATIONS:         --Patient is to take all scheduled medications on the day of surgery .    APPROVAL GIVEN to proceed with proposed procedure, without further diagnostic evaluation       Signed Electronically by: Kaiser Cartwright MD    Copy of this evaluation report is provided to requesting physician.    [place] Preop Guidelines    Revised Cardiac Risk Index

## 2018-10-30 NOTE — LETTER
October 31, 2018      Rosita Ramirez  [id number] [facility] [address]        Dear ,    Your lab results are within acceptable limits.     Resulted Orders   Comprehensive metabolic panel   Result Value Ref Range    Sodium 136 133 - 144 mmol/L    Potassium 4.2 3.4 - 5.3 mmol/L    Chloride 101 94 - 109 mmol/L    Carbon Dioxide 27 20 - 32 mmol/L    Anion Gap 8 3 - 14 mmol/L    Glucose 91 70 - 99 mg/dL    Urea Nitrogen 15 7 - 30 mg/dL    Creatinine 0.67 0.52 - 1.04 mg/dL    GFR Estimate 89 >60 mL/min/1.7m2      Comment:      Non  GFR Calc    GFR Estimate If Black >90 >60 mL/min/1.7m2      Comment:       GFR Calc    Calcium 9.5 8.5 - 10.1 mg/dL    Bilirubin Total 0.5 0.2 - 1.3 mg/dL    Albumin 4.0 3.4 - 5.0 g/dL    Protein Total 7.9 6.8 - 8.8 g/dL    Alkaline Phosphatase 62 40 - 150 U/L    ALT 43 0 - 50 U/L    AST 23 0 - 45 U/L       If you have any questions or concerns, please call the clinic at the number listed above.       Sincerely,        Kaiser Cartwright MD

## 2018-10-31 LAB
ALBUMIN SERPL-MCNC: 4 G/DL (ref 3.4–5)
ALP SERPL-CCNC: 62 U/L (ref 40–150)
ALT SERPL W P-5'-P-CCNC: 43 U/L (ref 0–50)
ANION GAP SERPL CALCULATED.3IONS-SCNC: 8 MMOL/L (ref 3–14)
AST SERPL W P-5'-P-CCNC: 23 U/L (ref 0–45)
BILIRUB SERPL-MCNC: 0.5 MG/DL (ref 0.2–1.3)
BUN SERPL-MCNC: 15 MG/DL (ref 7–30)
CALCIUM SERPL-MCNC: 9.5 MG/DL (ref 8.5–10.1)
CHLORIDE SERPL-SCNC: 101 MMOL/L (ref 94–109)
CO2 SERPL-SCNC: 27 MMOL/L (ref 20–32)
CREAT SERPL-MCNC: 0.67 MG/DL (ref 0.52–1.04)
GFR SERPL CREATININE-BSD FRML MDRD: 89 ML/MIN/1.7M2
GLUCOSE SERPL-MCNC: 91 MG/DL (ref 70–99)
POTASSIUM SERPL-SCNC: 4.2 MMOL/L (ref 3.4–5.3)
PROT SERPL-MCNC: 7.9 G/DL (ref 6.8–8.8)
SODIUM SERPL-SCNC: 136 MMOL/L (ref 133–144)

## 2019-01-24 DIAGNOSIS — I10 BENIGN ESSENTIAL HYPERTENSION: ICD-10-CM

## 2019-01-24 NOTE — TELEPHONE ENCOUNTER
"Requested Prescriptions   Pending Prescriptions Disp Refills     amLODIPine (NORVASC) 10 MG tablet [Pharmacy Med Name: AMLODIPINE BESYLATE 10MG TABLETS] 90 tablet 0    Last Written Prescription Date:  01/30/2018  Last Fill Quantity: 90,  # refills: 3   Last office visit: 10/30/2018 with prescribing provider:     Future Office Visit:   Sig: TAKE 1 TABLET BY MOUTH EVERY DAY    Calcium Channel Blockers Protocol  Passed - 1/24/2019 12:52 PM       Passed - Blood pressure under 140/90 in past 12 months    BP Readings from Last 3 Encounters:   10/30/18 108/68   01/30/18 108/68   02/03/17 112/62                Passed - Recent (12 mo) or future (30 days) visit within the authorizing provider's specialty    Patient had office visit in the last 12 months or has a visit in the next 30 days with authorizing provider or within the authorizing provider's specialty.  See \"Patient Info\" tab in inbasket, or \"Choose Columns\" in Meds & Orders section of the refill encounter.             Passed - Medication is active on med list       Passed - Patient is age 18 or older       Passed - No active pregnancy on record       Passed - Normal serum creatinine on file in past 12 months    Recent Labs   Lab Test 10/30/18  1415   CR 0.67            Passed - No positive pregnancy test in past 12 months        lisinopril-hydrochlorothiazide (PRINZIDE/ZESTORETIC) 20-12.5 MG tablet [Pharmacy Med Name: LISINOPRIL-HCTZ 20/12.5MG TABLETS] 90 tablet 0    Last Written Prescription Date:  01/30/2018  Last Fill Quantity: 90,  # refills: 3   Last office visit: 10/30/2018 with prescribing provider:     Future Office Visit:   Sig: TAKE 1 TABLET BY MOUTH EVERY DAY    Diuretics (Including Combos) Protocol Passed - 1/24/2019 12:52 PM       Passed - Blood pressure under 140/90 in past 12 months    BP Readings from Last 3 Encounters:   10/30/18 108/68 01/30/18 108/68 02/03/17 112/62                Passed - Recent (12 mo) or future (30 days) visit within the " "authorizing provider's specialty    Patient had office visit in the last 12 months or has a visit in the next 30 days with authorizing provider or within the authorizing provider's specialty.  See \"Patient Info\" tab in inbasket, or \"Choose Columns\" in Meds & Orders section of the refill encounter.             Passed - Medication is active on med list       Passed - Patient is age 18 or older       Passed - No active pregancy on record       Passed - Normal serum creatinine on file in past 12 months    Recent Labs   Lab Test 10/30/18  1415   CR 0.67             Passed - Normal serum potassium on file in past 12 months    Recent Labs   Lab Test 10/30/18  1415   POTASSIUM 4.2                   Passed - Normal serum sodium on file in past 12 months    Recent Labs   Lab Test 10/30/18  1415                Passed - No positive pregnancy test in past 12 months        "

## 2019-01-28 RX ORDER — LISINOPRIL AND HYDROCHLOROTHIAZIDE 12.5; 2 MG/1; MG/1
TABLET ORAL
Qty: 90 TABLET | Refills: 1 | Status: SHIPPED | OUTPATIENT
Start: 2019-01-28 | End: 2019-04-23

## 2019-01-28 RX ORDER — AMLODIPINE BESYLATE 10 MG/1
TABLET ORAL
Qty: 90 TABLET | Refills: 1 | Status: SHIPPED | OUTPATIENT
Start: 2019-01-28 | End: 2019-04-23

## 2019-01-28 NOTE — TELEPHONE ENCOUNTER
"Requested Prescriptions   Pending Prescriptions Disp Refills     amLODIPine (NORVASC) 10 MG tablet [Pharmacy Med Name: AMLODIPINE BESYLATE 10MG TABLETS] 90 tablet 0     Sig: TAKE 1 TABLET BY MOUTH EVERY DAY    Calcium Channel Blockers Protocol  Passed - 1/24/2019  1:38 PM       Passed - Blood pressure under 140/90 in past 12 months    BP Readings from Last 3 Encounters:   10/30/18 108/68 01/30/18 108/68 02/03/17 112/62                Passed - Recent (12 mo) or future (30 days) visit within the authorizing provider's specialty    Patient had office visit in the last 12 months or has a visit in the next 30 days with authorizing provider or within the authorizing provider's specialty.  See \"Patient Info\" tab in inbasket, or \"Choose Columns\" in Meds & Orders section of the refill encounter.             Passed - Medication is active on med list       Passed - Patient is age 18 or older       Passed - No active pregnancy on record       Passed - Normal serum creatinine on file in past 12 months    Recent Labs   Lab Test 10/30/18  1415   CR 0.67            Passed - No positive pregnancy test in past 12 months        lisinopril-hydrochlorothiazide (PRINZIDE/ZESTORETIC) 20-12.5 MG tablet [Pharmacy Med Name: LISINOPRIL-HCTZ 20/12.5MG TABLETS] 90 tablet 0     Sig: TAKE 1 TABLET BY MOUTH EVERY DAY    Diuretics (Including Combos) Protocol Passed - 1/24/2019  1:38 PM       Passed - Blood pressure under 140/90 in past 12 months    BP Readings from Last 3 Encounters:   10/30/18 108/68 01/30/18 108/68 02/03/17 112/62                Passed - Recent (12 mo) or future (30 days) visit within the authorizing provider's specialty    Patient had office visit in the last 12 months or has a visit in the next 30 days with authorizing provider or within the authorizing provider's specialty.  See \"Patient Info\" tab in inbasket, or \"Choose Columns\" in Meds & Orders section of the refill encounter.             Passed - Medication is " active on med list       Passed - Patient is age 18 or older       Passed - No active pregancy on record       Passed - Normal serum creatinine on file in past 12 months    Recent Labs   Lab Test 10/30/18  1415   CR 0.67             Passed - Normal serum potassium on file in past 12 months    Recent Labs   Lab Test 10/30/18  1415   POTASSIUM 4.2                   Passed - Normal serum sodium on file in past 12 months    Recent Labs   Lab Test 10/30/18  1415                Passed - No positive pregnancy test in past 12 months        Prescription approved per [facility] Refill Protocol.

## 2019-02-08 DIAGNOSIS — R73.09 ABNORMAL BLOOD SUGAR: ICD-10-CM

## 2019-02-08 NOTE — TELEPHONE ENCOUNTER
"Requested Prescriptions   Pending Prescriptions Disp Refills     metFORMIN (GLUCOPHAGE) 500 MG tablet [Pharmacy Med Name: METFORMIN 500MG TABLETS] 360 tablet 0    Last Written Prescription Date:  01/30/2018  Last Fill Quantity: 360,  # refills: 3   Last office visit: 10/30/2018 with prescribing provider:     Future Office Visit:   Sig: TAKE 2 TABLETS BY MOUTH TWICE DAILY WITH MEALS    Biguanide Agents Failed - 2/8/2019  1:54 PM       Failed - Patient has documented LDL within the past 12 mos.    Recent Labs   Lab Test 01/30/18  0925   LDL 32            Failed - Patient has documented A1c within the specified period of time.    If HgbA1C is 8 or greater, it needs to be on file within the past 3 months.  If less than 8, must be on file within the past 6 months.     Recent Labs   Lab Test 01/30/18  0925   A1C 5.9            Passed - Blood pressure less than 140/90 in past 6 months    BP Readings from Last 3 Encounters:   10/30/18 108/68   01/30/18 108/68   02/03/17 112/62                Passed - Patient has had a Microalbumin in the past 15 mos.    Recent Labs   Lab Test 01/30/18  0926   MICROL 10   UMALCR 7.62            Passed - Patient is age 10 or older       Passed - Patient's CR is NOT>1.4 OR Patient's EGFR is NOT<45 within past 12 mos.    Recent Labs   Lab Test 10/30/18  1415   GFRESTIMATED 89   GFRESTBLACK >90       Recent Labs   Lab Test 10/30/18  1415   CR 0.67            Passed - Patient does NOT have a diagnosis of CHF.       Passed - Medication is active on med list       Passed - Patient is not pregnant       Passed - Patient has not had a positive pregnancy test within the past 12 mos.        Passed - Recent (6 mo) or future (30 days) visit within the authorizing provider's specialty    Patient had office visit in the last 6 months or has a visit in the next 30 days with authorizing provider or within the authorizing provider's specialty.  See \"Patient Info\" tab in inbasket, or \"Choose Columns\" in Meds & " Orders section of the refill encounter.

## 2019-02-11 NOTE — TELEPHONE ENCOUNTER
Medication is being filled for 1 time refill only due to:  Patient needs to be seen because diabetic follow up due.

## 2019-03-12 DIAGNOSIS — E78.5 HYPERLIPIDEMIA LDL GOAL <130: ICD-10-CM

## 2019-03-12 NOTE — LETTER
[place] [address]  [facility] [address]  Phone: [phone]        March 13, 2019      Rosita Ramirez                                                                                                                                [address] [facility] [address]            Dear Ms. Ramirez,    We are concerned about your health care.  We recently provided you with a medication refill.  Many medications require routine follow-up with your Doctor.      At this time we ask that: You schedule a routine office visit with your physician to follow your diabetes and fasting labs.    Your prescription: Has been refilled once so you may have time for the above noted follow-up.      Thank you,      Dr. Ewelina Rodriguez

## 2019-03-13 RX ORDER — ATORVASTATIN CALCIUM 10 MG/1
TABLET, FILM COATED ORAL
Qty: 30 TABLET | Refills: 0 | Status: SHIPPED | OUTPATIENT
Start: 2019-03-13 | End: 2019-04-23

## 2019-03-13 NOTE — TELEPHONE ENCOUNTER
"Requested Prescriptions   Pending Prescriptions Disp Refills     atorvastatin (LIPITOR) 10 MG tablet [Pharmacy Med Name: ATORVASTATIN 10MG TABLETS] 30 tablet 0     Sig: TAKE 1 TABLET BY MOUTH EVERY DAY    Statins Protocol Failed - 3/12/2019  4:12 PM       Failed - LDL on file in past 12 months    Recent Labs   Lab Test 01/30/18  0925   LDL 32            Passed - No abnormal creatine kinase in past 12 months    No lab results found.            Passed - Recent (12 mo) or future (30 days) visit within the authorizing provider's specialty    Patient had office visit in the last 12 months or has a visit in the next 30 days with authorizing provider or within the authorizing provider's specialty.  See \"Patient Info\" tab in inbasket, or \"Choose Columns\" in Meds & Orders section of the refill encounter.             Passed - Medication is active on med list       Passed - Patient is age 18 or older       Passed - No active pregnancy on record       Passed - No positive pregnancy test in past 12 months        Last office visit 10/30/18.  Medication is being filled for 1 time refill only due to:  Patient needs to be seen because due for diabetic follow up and fasting labs..     Patient did not read last [employer] reminder.  Mailed letter today.    Rosmery Jack RN      "

## 2019-04-23 ENCOUNTER — OFFICE VISIT (OUTPATIENT)
Dept: INTERNAL MEDICINE | Facility: CLINIC | Age: 64
End: 2019-04-23
Payer: COMMERCIAL

## 2019-04-23 VITALS
OXYGEN SATURATION: 97 % | BODY MASS INDEX: 41.32 KG/M2 | RESPIRATION RATE: 16 BRPM | DIASTOLIC BLOOD PRESSURE: 68 MMHG | HEART RATE: 77 BPM | WEIGHT: 248 LBS | SYSTOLIC BLOOD PRESSURE: 104 MMHG | TEMPERATURE: 98.3 F | HEIGHT: 65 IN

## 2019-04-23 DIAGNOSIS — R73.09 ABNORMAL BLOOD SUGAR: ICD-10-CM

## 2019-04-23 DIAGNOSIS — E78.5 HYPERLIPIDEMIA LDL GOAL <130: ICD-10-CM

## 2019-04-23 DIAGNOSIS — E66.01 MORBID OBESITY (H): ICD-10-CM

## 2019-04-23 DIAGNOSIS — I10 BENIGN ESSENTIAL HYPERTENSION: ICD-10-CM

## 2019-04-23 LAB — HBA1C MFR BLD: 6.7 % (ref 0–5.6)

## 2019-04-23 PROCEDURE — 36415 COLL VENOUS BLD VENIPUNCTURE: CPT | Performed by: INTERNAL MEDICINE

## 2019-04-23 PROCEDURE — 82043 UR ALBUMIN QUANTITATIVE: CPT | Performed by: INTERNAL MEDICINE

## 2019-04-23 PROCEDURE — 83036 HEMOGLOBIN GLYCOSYLATED A1C: CPT | Performed by: INTERNAL MEDICINE

## 2019-04-23 PROCEDURE — 99214 OFFICE O/P EST MOD 30 MIN: CPT | Performed by: INTERNAL MEDICINE

## 2019-04-23 PROCEDURE — 80061 LIPID PANEL: CPT | Performed by: INTERNAL MEDICINE

## 2019-04-23 PROCEDURE — 80048 BASIC METABOLIC PNL TOTAL CA: CPT | Performed by: INTERNAL MEDICINE

## 2019-04-23 RX ORDER — AMLODIPINE BESYLATE 10 MG/1
10 TABLET ORAL DAILY
Qty: 90 TABLET | Refills: 3 | Status: SHIPPED | OUTPATIENT
Start: 2019-04-23 | End: 2020-04-24

## 2019-04-23 RX ORDER — LISINOPRIL AND HYDROCHLOROTHIAZIDE 12.5; 2 MG/1; MG/1
1 TABLET ORAL DAILY
Qty: 90 TABLET | Refills: 3 | Status: SHIPPED | OUTPATIENT
Start: 2019-04-23 | End: 2020-04-24

## 2019-04-23 RX ORDER — ATORVASTATIN CALCIUM 10 MG/1
10 TABLET, FILM COATED ORAL DAILY
Qty: 90 TABLET | Refills: 3 | Status: SHIPPED | OUTPATIENT
Start: 2019-04-23 | End: 2020-04-24

## 2019-04-23 ASSESSMENT — MIFFLIN-ST. JEOR: SCORE: 1671.83

## 2019-04-23 NOTE — PROGRESS NOTES
SUBJECTIVE:   Rosita Ramirez is a 64 year old female who presents to clinic today for the following   health issues:      Hyperlipidemia Follow-Up      Rate your low fat/cholesterol diet?: good    Taking statin?  Yes, muscle aches, possibly from other cause    Other lipid medications/supplements?:  none    Hypertension Follow-up      Outpatient blood pressures are not being checked.    Low Salt Diet: not monitoring salt      Amount of exercise or physical activity: None    Problems taking medications regularly: No    Medication side effects: none    Diet: regular (no restrictions)      Other problems:   1. Abnormal blood sugar: stable on metformin  2. Morbid obesity: no significant weight change    Current Concerns:   none    Patient Active Problem List   Diagnosis     Benign essential hypertension     Hyperlipidemia LDL goal <130     Family history of diabetes mellitus     Abnormal blood sugar     Morbid obesity (H)       Current Outpatient Medications   Medication Sig Dispense Refill     amLODIPine (NORVASC) 10 MG tablet TAKE 1 TABLET BY MOUTH EVERY DAY 90 tablet 1     atorvastatin (LIPITOR) 10 MG tablet TAKE 1 TABLET BY MOUTH EVERY DAY 30 tablet 0     latanoprost (XALATAN) 0.005 % ophthalmic solution 1 drop At Bedtime       lisinopril-hydrochlorothiazide (PRINZIDE/ZESTORETIC) 20-12.5 MG tablet TAKE 1 TABLET BY MOUTH EVERY DAY 90 tablet 1     metFORMIN (GLUCOPHAGE) 500 MG tablet TAKE 2 TABLETS BY MOUTH TWICE DAILY WITH MEALS 360 tablet 0     timolol hemihydrate (BETIMOL) 0.5 % SOLN ophthalmic solution 1 drop 2 times daily           Social History     Tobacco Use     Smoking status: Never Smoker     Smokeless tobacco: Never Used   Substance Use Topics     Alcohol use: No            Reviewed  and updated as needed this visit by clinical staff         Reviewed and updated as needed this visit by Provider             ROS:  No fever, chills, no dyspnea on exertion, no chest pain, palpitations, PND, orthopnea,  "edema, syncope, headache, abdominal pain     OBJECTIVE:     /68   Pulse 77   Temp 98.3  F (36.8  C) (Oral)   Resp 16   Ht 1.645 m (5' 4.75\")   Wt 112.5 kg (248 lb)   SpO2 97%   BMI 41.59 kg/m    Body mass index is 41.59 kg/m .    Lungs: clear  CV: normal S1, S2 without murmur, S3 or S4.   No edema  Pulses full, no carotid bruits        ASSESSMENT/PLAN:             1. Benign essential hypertension  Well controlled, she reports that her  is \"difficult\" and thinks her blood pressure may go up higher at home so she does not wish to change medications.  She has noticed symptoms of low blood pressure.  - amLODIPine (NORVASC) 10 MG tablet; Take 1 tablet (10 mg) by mouth daily  Dispense: 90 tablet; Refill: 3  - lisinopril-hydrochlorothiazide (PRINZIDE/ZESTORETIC) 20-12.5 MG tablet; Take 1 tablet by mouth daily  Dispense: 90 tablet; Refill: 3  - Basic metabolic panel  (Ca, Cl, CO2, Creat, Gluc, K, Na, BUN)  - Albumin Random Urine Quantitative with Creat Ratio    2. Morbid obesity (H)  No change in weight, encouraged her to try to work on exercise and diet    3. Hyperlipidemia LDL goal <130  Recheck labs today, continue medication  - atorvastatin (LIPITOR) 10 MG tablet; Take 1 tablet (10 mg) by mouth daily  Dispense: 90 tablet; Refill: 3  - Lipid panel reflex to direct LDL Fasting    4. Abnormal blood sugar  Recheck labs today, continue medication, work on diet and exercise and weight loss.  - metFORMIN (GLUCOPHAGE) 500 MG tablet; Take 2 tablets (1,000 mg) by mouth 2 times daily (with meals)  Dispense: 360 tablet; Refill: 3  - Hemoglobin A1c        Ewelina Rodriguez MD  [facility]        "

## 2019-04-23 NOTE — NURSING NOTE
"Vital signs:  Temp: 98.3  F (36.8  C) Temp src: Oral BP: 104/68 Pulse: 77   Resp: 16 SpO2: 97 %     Height: 164.5 cm (5' 4.75\") Weight: 112.5 kg (248 lb)  Estimated body mass index is 41.59 kg/m  as calculated from the following:    Height as of this encounter: 1.645 m (5' 4.75\").    Weight as of this encounter: 112.5 kg (248 lb).          "

## 2019-04-24 LAB
ANION GAP SERPL CALCULATED.3IONS-SCNC: 5 MMOL/L (ref 3–14)
BUN SERPL-MCNC: 13 MG/DL (ref 7–30)
CALCIUM SERPL-MCNC: 9.5 MG/DL (ref 8.5–10.1)
CHLORIDE SERPL-SCNC: 106 MMOL/L (ref 94–109)
CHOLEST SERPL-MCNC: 146 MG/DL
CO2 SERPL-SCNC: 28 MMOL/L (ref 20–32)
CREAT SERPL-MCNC: 0.68 MG/DL (ref 0.52–1.04)
CREAT UR-MCNC: 70 MG/DL
GFR SERPL CREATININE-BSD FRML MDRD: >90 ML/MIN/{1.73_M2}
GLUCOSE SERPL-MCNC: 120 MG/DL (ref 70–99)
HDLC SERPL-MCNC: 71 MG/DL
LDLC SERPL CALC-MCNC: 52 MG/DL
MICROALBUMIN UR-MCNC: 14 MG/L
MICROALBUMIN/CREAT UR: 20 MG/G CR (ref 0–25)
NONHDLC SERPL-MCNC: 75 MG/DL
POTASSIUM SERPL-SCNC: 4 MMOL/L (ref 3.4–5.3)
SODIUM SERPL-SCNC: 139 MMOL/L (ref 133–144)
TRIGL SERPL-MCNC: 113 MG/DL

## 2020-04-21 DIAGNOSIS — R73.09 ABNORMAL BLOOD SUGAR: ICD-10-CM

## 2020-04-21 DIAGNOSIS — E78.5 HYPERLIPIDEMIA LDL GOAL <130: ICD-10-CM

## 2020-04-21 DIAGNOSIS — I10 BENIGN ESSENTIAL HYPERTENSION: ICD-10-CM

## 2020-04-23 NOTE — TELEPHONE ENCOUNTER
Spoke with patient and advised to come in for lab, but pt is seriously afraid to come into the clinic and declined. Telephone appointment scheduled with pcp for 04/28/2020    Arnoldo Hunter MA

## 2020-04-24 ENCOUNTER — TELEPHONE (OUTPATIENT)
Dept: INTERNAL MEDICINE | Facility: CLINIC | Age: 65
End: 2020-04-24

## 2020-04-24 DIAGNOSIS — R73.09 ABNORMAL BLOOD SUGAR: ICD-10-CM

## 2020-04-24 DIAGNOSIS — E78.5 HYPERLIPIDEMIA LDL GOAL <130: ICD-10-CM

## 2020-04-24 DIAGNOSIS — I10 BENIGN ESSENTIAL HYPERTENSION: ICD-10-CM

## 2020-04-24 RX ORDER — ATORVASTATIN CALCIUM 10 MG/1
TABLET, FILM COATED ORAL
Qty: 90 TABLET | Refills: 0 | Status: SHIPPED | OUTPATIENT
Start: 2020-04-24 | End: 2020-04-24

## 2020-04-24 RX ORDER — AMLODIPINE BESYLATE 10 MG/1
10 TABLET ORAL DAILY
Qty: 90 TABLET | Refills: 0 | Status: SHIPPED | OUTPATIENT
Start: 2020-04-24 | End: 2020-04-28

## 2020-04-24 RX ORDER — LISINOPRIL AND HYDROCHLOROTHIAZIDE 12.5; 2 MG/1; MG/1
1 TABLET ORAL DAILY
Qty: 90 TABLET | Refills: 0 | Status: SHIPPED | OUTPATIENT
Start: 2020-04-24 | End: 2020-04-28

## 2020-04-24 RX ORDER — AMLODIPINE BESYLATE 10 MG/1
TABLET ORAL
Qty: 90 TABLET | Refills: 0 | Status: SHIPPED | OUTPATIENT
Start: 2020-04-24 | End: 2020-04-24

## 2020-04-24 RX ORDER — ATORVASTATIN CALCIUM 10 MG/1
10 TABLET, FILM COATED ORAL DAILY
Qty: 90 TABLET | Refills: 0 | Status: SHIPPED | OUTPATIENT
Start: 2020-04-24 | End: 2020-04-28

## 2020-04-24 NOTE — TELEPHONE ENCOUNTER
Patient declined labs, but has phone visit scheduled. Routed to provider to review if refill can be done prior to appointment on 4/28/2020.

## 2020-04-24 NOTE — TELEPHONE ENCOUNTER
Patient's  called to request a refill of the Lisinopril as well. Patient is completely out of 3 of her medications. She has changed pharmacies and is now using [facility] [place] [facility] [address]. Change [facility]atin and [facility]

## 2020-04-24 NOTE — TELEPHONE ENCOUNTER
Patient requesting metformin, atorvastatin, amlodipine be sent to another pharmacy.  Prescription approved per [facility] Refill Protocol.    Patient also requesting a refill for lisinopril-hydrochlorothiazide   Routing refill request to provider for review/approval because:  Patient needs to be seen because it has been more than 1 year since last office visit.    Next 5 appointments (look out 90 days)    Apr 28, 2020  8:00 AM CDT  Telephone Visit with Ewelina Rodriguez MD  [facility] ([facility]) [address]  [facility] [id number] [phone]

## 2020-04-28 ENCOUNTER — VIRTUAL VISIT (OUTPATIENT)
Dept: INTERNAL MEDICINE | Facility: CLINIC | Age: 65
End: 2020-04-28
Payer: COMMERCIAL

## 2020-04-28 DIAGNOSIS — E11.9 TYPE 2 DIABETES MELLITUS WITHOUT COMPLICATION, WITHOUT LONG-TERM CURRENT USE OF INSULIN (H): ICD-10-CM

## 2020-04-28 DIAGNOSIS — E78.5 HYPERLIPIDEMIA LDL GOAL <100: ICD-10-CM

## 2020-04-28 DIAGNOSIS — E66.01 MORBID OBESITY (H): ICD-10-CM

## 2020-04-28 DIAGNOSIS — I10 BENIGN ESSENTIAL HYPERTENSION: ICD-10-CM

## 2020-04-28 PROBLEM — R73.09 ABNORMAL BLOOD SUGAR: Status: RESOLVED | Noted: 2018-01-30 | Resolved: 2020-04-28

## 2020-04-28 PROCEDURE — 99214 OFFICE O/P EST MOD 30 MIN: CPT | Mod: 95 | Performed by: INTERNAL MEDICINE

## 2020-04-28 RX ORDER — LISINOPRIL AND HYDROCHLOROTHIAZIDE 12.5; 2 MG/1; MG/1
1 TABLET ORAL DAILY
Qty: 90 TABLET | Refills: 3 | Status: SHIPPED | OUTPATIENT
Start: 2020-04-28 | End: 2021-06-24

## 2020-04-28 RX ORDER — LANCETS
EACH MISCELLANEOUS
Qty: 100 EACH | Refills: 3 | Status: SHIPPED | OUTPATIENT
Start: 2020-04-28 | End: 2021-06-24

## 2020-04-28 RX ORDER — AMLODIPINE BESYLATE 10 MG/1
10 TABLET ORAL DAILY
Qty: 90 TABLET | Refills: 3 | Status: SHIPPED | OUTPATIENT
Start: 2020-04-28 | End: 2021-06-24

## 2020-04-28 RX ORDER — ATORVASTATIN CALCIUM 10 MG/1
10 TABLET, FILM COATED ORAL DAILY
Qty: 90 TABLET | Refills: 3 | Status: SHIPPED | OUTPATIENT
Start: 2020-04-28 | End: 2021-06-24

## 2020-04-28 NOTE — PROGRESS NOTES
"Rosita Ramirez is a 65 year old female who is being evaluated via a billable telephone visit.      The patient has been notified of following:     \"This telephone visit will be conducted via a call between you and your physician/provider. We have found that certain health care needs can be provided without the need for a physical exam.  This service lets us provide the care you need with a short phone conversation.  If a prescription is necessary we can send it directly to your pharmacy.  If lab work is needed we can place an order for that and you can then stop by our lab to have the test done at a later time.    Telephone visits are billed at different rates depending on your insurance coverage. During this emergency period, for some insurers they may be billed the same as an in-person visit.  Please reach out to your insurance provider with any questions.    If during the course of the call the physician/provider feels a telephone visit is not appropriate, you will not be charged for this service.\"    Patient has given verbal consent for Telephone visit?  Yes    How would you like to obtain your AVS? Hugo Mejia     Rosita Ramirez is a 65 year old female who presents to clinic today for the following health issues:    HPI  Hyperlipidemia Follow-Up      Are you regularly taking any medication or supplement to lower your cholesterol?   Yes- atorvastatin    Are you having muscle aches or other side effects that you think could be caused by your cholesterol lowering medication?  No    Hypertension Follow-up      Do you check your blood pressure regularly outside of the clinic? Yes     Are you following a low salt diet? No    Are your blood pressures ever more than 140 on the top number (systolic) OR more   than 90 on the bottom number (diastolic), for example 140/90? No      How many servings of fruits and vegetables do you eat daily?  4 or more    On average, how many sweetened beverages do you drink " each day (Examples: soda, juice, sweet tea, etc.  Do NOT count diet or artificially sweetened beverages)?   0    How many days per week do you exercise enough to make your heart beat faster? 3 or less    How many minutes a day do you exercise enough to make your heart beat faster? 9 or less    How many days per week do you miss taking your medication? 0    Other problems:   1. Type 2 diabetes: she did not follow up last year after I sent her results showing diabetes  2. Morbid obesity: she reports she lost 25 pounds the past year    Current Concerns: none    Patient Active Problem List   Diagnosis     Benign essential hypertension     Hyperlipidemia LDL goal <130     Family history of diabetes mellitus     Abnormal blood sugar     Morbid obesity (H)       Current Outpatient Medications   Medication Sig Dispense Refill     amLODIPine (NORVASC) 10 MG tablet Take 1 tablet (10 mg) by mouth daily 90 tablet 0     atorvastatin (LIPITOR) 10 MG tablet Take 1 tablet (10 mg) by mouth daily 90 tablet 0     latanoprost (XALATAN) 0.005 % ophthalmic solution 1 drop At Bedtime       lisinopril-hydrochlorothiazide (ZESTORETIC) 20-12.5 MG tablet Take 1 tablet by mouth daily 90 tablet 0     metFORMIN (GLUCOPHAGE) 500 MG tablet Take 2 tablets by mouth 2 times daily (with meals.) 360 tablet 0     timolol hemihydrate (BETIMOL) 0.5 % SOLN ophthalmic solution 1 drop 2 times daily           Social History     Tobacco Use     Smoking status: Never Smoker     Smokeless tobacco: Never Used   Substance Use Topics     Alcohol use: No        Reviewed and updated as needed this visit by Provider         Review of Systems   No fever, chills, no dyspnea on exertion, no chest pain, palpitations, PND, orthopnea, edema, syncope, headache, abdominal pain        Objective       Assessment/Plan:  1. Type 2 diabetes mellitus without complication, without long-term current use of insulin (H)  Discussed diagnosis, diet, exercises, lab results, refer for  education, get labs, start meter  - metFORMIN (GLUCOPHAGE) 500 MG tablet; Take 2 tablets by mouth 2 times daily (with meals.)  Dispense: 360 tablet; Refill: 3  - AMBULATORY ADULT DIABETES EDUCATOR REFERRAL; Future  - blood glucose monitoring (NO BRAND SPECIFIED) meter device kit; Use to test blood sugar 1 times daily or as directed. Brand per insurance  Dispense: 1 kit; Refill: 0  - blood glucose (NO BRAND SPECIFIED) test strip; Use to test blood sugar 1 times daily or as directed.  Dispense: 100 strip; Refill: 3  - thin (NO BRAND SPECIFIED) lancets; Use with lanceting device once daily  Dispense: 100 each; Refill: 3  - Basic metabolic panel  (Ca, Cl, CO2, Creat, Gluc, K, Na, BUN); Future  - Hemoglobin A1c; Future  - Albumin Random Urine Quantitative with Creat Ratio; Future    2. Morbid obesity (H)  Improved weight, continue working on this    3. Benign essential hypertension  Controlled by history, continue meds  - amLODIPine (NORVASC) 10 MG tablet; Take 1 tablet (10 mg) by mouth daily  Dispense: 90 tablet; Refill: 3  - lisinopril-hydrochlorothiazide (ZESTORETIC) 20-12.5 MG tablet; Take 1 tablet by mouth daily  Dispense: 90 tablet; Refill: 3  - Basic metabolic panel  (Ca, Cl, CO2, Creat, Gluc, K, Na, BUN); Future  - Albumin Random Urine Quantitative with Creat Ratio; Future    4. Hyperlipidemia LDL goal <100  Check labs, advised of new goal with diabetes diagnosis  - atorvastatin (LIPITOR) 10 MG tablet; Take 1 tablet (10 mg) by mouth daily  Dispense: 90 tablet; Refill: 3  - Lipid panel reflex to direct LDL Fasting; Future    Return in about 1 week (around 5/5/2020) for Lab with blood and urine.      Phone call duration:  21 minutes    Ewelina Rodriguez MD

## 2020-05-07 ENCOUNTER — TELEPHONE (OUTPATIENT)
Dept: INTERNAL MEDICINE | Facility: CLINIC | Age: 65
End: 2020-05-07

## 2020-05-07 NOTE — TELEPHONE ENCOUNTER
Diabetes Education Scheduling Outreach #1:    Call to patient to schedule. Patient declined to schedule and wants to wait until COVID has calmed down.    Ledy Mao  [facility]  Diabetes and Nutrition Scheduling

## 2020-12-28 ENCOUNTER — TRANSFERRED RECORDS (OUTPATIENT)
Dept: HEALTH INFORMATION MANAGEMENT | Facility: CLINIC | Age: 65
End: 2020-12-28

## 2020-12-28 LAB — RETINOPATHY: NEGATIVE

## 2021-03-09 ENCOUNTER — IMMUNIZATION (OUTPATIENT)
Dept: NURSING | Facility: CLINIC | Age: 66
End: 2021-03-09
Payer: COMMERCIAL

## 2021-03-09 PROCEDURE — 91301 PR COVID VAC MODERNA 100 MCG/0.5 ML IM: CPT

## 2021-03-09 PROCEDURE — 0011A PR COVID VAC MODERNA 100 MCG/0.5 ML IM: CPT

## 2021-04-06 ENCOUNTER — IMMUNIZATION (OUTPATIENT)
Dept: NURSING | Facility: CLINIC | Age: 66
End: 2021-04-06
Attending: INTERNAL MEDICINE
Payer: COMMERCIAL

## 2021-04-06 PROCEDURE — 91301 PR COVID VAC MODERNA 100 MCG/0.5 ML IM: CPT

## 2021-04-06 PROCEDURE — 0012A PR COVID VAC MODERNA 100 MCG/0.5 ML IM: CPT

## 2021-05-29 DIAGNOSIS — E11.9 TYPE 2 DIABETES MELLITUS WITHOUT COMPLICATION, WITHOUT LONG-TERM CURRENT USE OF INSULIN (H): ICD-10-CM

## 2021-06-02 NOTE — TELEPHONE ENCOUNTER
Pending Prescriptions:                       Disp   Refills    metFORMIN (GLUCOPHAGE) 500 MG tablet [Phar*360 ta*3        Sig: Take 2 tablets by mouth 2 times daily (with meals.)    CONTOUR NEXT TEST test strip [Pharmacy Med*100 st*3        Sig: USE TO TEST BLOOD SUGAR 1 TIMES DAILY OR AS DIRECTED.    Routing refill request to provider for review/approval because:  Patient needs to be seen because it has been more than 1 year since last office visit.

## 2021-06-03 NOTE — TELEPHONE ENCOUNTER
Patient advised and agrees to plan.  Patient will schedule on My Chart. Please route back to provider after appointment is scheduled.

## 2021-06-03 NOTE — TELEPHONE ENCOUNTER
Patient is supposed to be seen every 4-6 months. Last visit 4/2020. Call to schedule lab and appointment ASAP.

## 2021-06-22 DIAGNOSIS — E11.9 TYPE 2 DIABETES MELLITUS WITHOUT COMPLICATION, WITHOUT LONG-TERM CURRENT USE OF INSULIN (H): ICD-10-CM

## 2021-06-22 DIAGNOSIS — I10 BENIGN ESSENTIAL HYPERTENSION: ICD-10-CM

## 2021-06-22 DIAGNOSIS — E78.5 HYPERLIPIDEMIA LDL GOAL <100: ICD-10-CM

## 2021-06-22 LAB — HBA1C MFR BLD: 6.5 % (ref 0–5.6)

## 2021-06-22 PROCEDURE — 83036 HEMOGLOBIN GLYCOSYLATED A1C: CPT | Performed by: INTERNAL MEDICINE

## 2021-06-22 PROCEDURE — 80061 LIPID PANEL: CPT | Performed by: INTERNAL MEDICINE

## 2021-06-22 PROCEDURE — 82043 UR ALBUMIN QUANTITATIVE: CPT | Performed by: INTERNAL MEDICINE

## 2021-06-22 PROCEDURE — 36415 COLL VENOUS BLD VENIPUNCTURE: CPT | Performed by: INTERNAL MEDICINE

## 2021-06-22 PROCEDURE — 80048 BASIC METABOLIC PNL TOTAL CA: CPT | Performed by: INTERNAL MEDICINE

## 2021-06-23 LAB
ANION GAP SERPL CALCULATED.3IONS-SCNC: 6 MMOL/L (ref 3–14)
BUN SERPL-MCNC: 17 MG/DL (ref 7–30)
CALCIUM SERPL-MCNC: 9 MG/DL (ref 8.5–10.1)
CHLORIDE SERPL-SCNC: 105 MMOL/L (ref 94–109)
CHOLEST SERPL-MCNC: 147 MG/DL
CO2 SERPL-SCNC: 26 MMOL/L (ref 20–32)
CREAT SERPL-MCNC: 0.71 MG/DL (ref 0.52–1.04)
CREAT UR-MCNC: 139 MG/DL
GFR SERPL CREATININE-BSD FRML MDRD: 88 ML/MIN/{1.73_M2}
GLUCOSE SERPL-MCNC: 117 MG/DL (ref 70–99)
HDLC SERPL-MCNC: 75 MG/DL
LDLC SERPL CALC-MCNC: 47 MG/DL
MICROALBUMIN UR-MCNC: 27 MG/L
MICROALBUMIN/CREAT UR: 19.57 MG/G CR (ref 0–25)
NONHDLC SERPL-MCNC: 72 MG/DL
POTASSIUM SERPL-SCNC: 4.2 MMOL/L (ref 3.4–5.3)
SODIUM SERPL-SCNC: 137 MMOL/L (ref 133–144)
TRIGL SERPL-MCNC: 124 MG/DL

## 2021-06-24 ENCOUNTER — OFFICE VISIT (OUTPATIENT)
Dept: INTERNAL MEDICINE | Facility: CLINIC | Age: 66
End: 2021-06-24
Payer: COMMERCIAL

## 2021-06-24 VITALS
HEART RATE: 96 BPM | OXYGEN SATURATION: 97 % | BODY MASS INDEX: 40.8 KG/M2 | TEMPERATURE: 98.8 F | SYSTOLIC BLOOD PRESSURE: 126 MMHG | HEIGHT: 64 IN | WEIGHT: 239 LBS | DIASTOLIC BLOOD PRESSURE: 70 MMHG | RESPIRATION RATE: 12 BRPM

## 2021-06-24 DIAGNOSIS — E66.01 MORBID OBESITY (H): ICD-10-CM

## 2021-06-24 DIAGNOSIS — Z23 NEED FOR VACCINATION: ICD-10-CM

## 2021-06-24 DIAGNOSIS — E11.9 TYPE 2 DIABETES MELLITUS WITHOUT COMPLICATION, WITHOUT LONG-TERM CURRENT USE OF INSULIN (H): Primary | ICD-10-CM

## 2021-06-24 DIAGNOSIS — Z12.31 ENCOUNTER FOR SCREENING MAMMOGRAM FOR BREAST CANCER: ICD-10-CM

## 2021-06-24 DIAGNOSIS — E78.5 HYPERLIPIDEMIA LDL GOAL <100: ICD-10-CM

## 2021-06-24 DIAGNOSIS — I10 BENIGN ESSENTIAL HYPERTENSION: ICD-10-CM

## 2021-06-24 PROCEDURE — 99207 PR FOOT EXAM NO CHARGE: CPT | Mod: 25 | Performed by: INTERNAL MEDICINE

## 2021-06-24 PROCEDURE — 90471 IMMUNIZATION ADMIN: CPT | Performed by: INTERNAL MEDICINE

## 2021-06-24 PROCEDURE — 90670 PCV13 VACCINE IM: CPT | Performed by: INTERNAL MEDICINE

## 2021-06-24 PROCEDURE — 99214 OFFICE O/P EST MOD 30 MIN: CPT | Mod: 25 | Performed by: INTERNAL MEDICINE

## 2021-06-24 RX ORDER — ATORVASTATIN CALCIUM 10 MG/1
10 TABLET, FILM COATED ORAL DAILY
Qty: 90 TABLET | Refills: 3 | Status: SHIPPED | OUTPATIENT
Start: 2021-06-24 | End: 2022-07-12

## 2021-06-24 RX ORDER — LANCETS
EACH MISCELLANEOUS
Qty: 100 EACH | Refills: 3 | Status: SHIPPED | OUTPATIENT
Start: 2021-06-24 | End: 2022-10-09

## 2021-06-24 RX ORDER — AMLODIPINE BESYLATE 10 MG/1
10 TABLET ORAL DAILY
Qty: 90 TABLET | Refills: 3 | Status: SHIPPED | OUTPATIENT
Start: 2021-06-24 | End: 2022-07-12

## 2021-06-24 RX ORDER — LISINOPRIL AND HYDROCHLOROTHIAZIDE 12.5; 2 MG/1; MG/1
1 TABLET ORAL DAILY
Qty: 90 TABLET | Refills: 3 | Status: SHIPPED | OUTPATIENT
Start: 2021-06-24 | End: 2022-07-12

## 2021-06-24 ASSESSMENT — MIFFLIN-ST. JEOR: SCORE: 1601.16

## 2021-06-24 NOTE — NURSING NOTE
Prior to immunization administration, verified patients identity using patient s name and date of birth. Please see Immunization Activity for additional information.     Screening Questionnaire for Adult Immunization    Are you sick today?   No   Do you have allergies to medications, food, a vaccine component or latex?   No   Have you ever had a serious reaction after receiving a vaccination?   No   Do you have a long-term health problem with heart, lung, kidney, or metabolic disease (e.g., diabetes), asthma, a blood disorder, no spleen, complement component deficiency, a cochlear implant, or a spinal fluid leak?  Are you on long-term aspirin therapy?   No   Do you have cancer, leukemia, HIV/AIDS, or any other immune system problem?   No   Do you have a parent, brother, or sister with an immune system problem?   No   In the past 3 months, have you taken medications that affect  your immune system, such as prednisone, other steroids, or anticancer drugs; drugs for the treatment of rheumatoid arthritis, Crohn s disease, or psoriasis; or have you had radiation treatments?   No   Have you had a seizure, or a brain or other nervous system problem?   No   During the past year, have you received a transfusion of blood or blood    products, or been given immune (gamma) globulin or antiviral drug?   No   For women: Are you pregnant or is there a chance you could become       pregnant during the next month?   No   Have you received any vaccinations in the past 4 weeks?   No     Immunization questionnaire answers were all negative.        Per orders of Dr. Rodriguez, injection of prevnar 13 given by Amara Simmons LPN. Patient instructed to remain in clinic for 15 minutes afterwards, and to report any adverse reaction to me immediately.       Screening performed by Amara Simmons LPN on 6/24/2021 at 3:27 PM.

## 2021-06-24 NOTE — Clinical Note
Yes- Date of last eye exam: Dec 28/2020,  Location: [facility] ,  [facility] /patient

## 2021-06-24 NOTE — PROGRESS NOTES
Assessment & Plan     Type 2 diabetes mellitus without complication, without long-term current use of insulin (H)  Well controlled, continue med  - metFORMIN (GLUCOPHAGE) 500 MG tablet; Take 2 tablets (1,000 mg) by mouth 2 times daily (with meals)  - thin (NO BRAND SPECIFIED) lancets; Use with lanceting device once daily    Morbid obesity (H)  Decreased weight, continue working on this    Benign essential hypertension  well controlled, continue meds  - amLODIPine (NORVASC) 10 MG tablet; Take 1 tablet (10 mg) by mouth daily  - lisinopril-hydrochlorothiazide (ZESTORETIC) 20-12.5 MG tablet; Take 1 tablet by mouth daily    Hyperlipidemia LDL goal <100  At goal, continue med   - atorvastatin (LIPITOR) 10 MG tablet; Take 1 tablet (10 mg) by mouth daily    Need for vaccination    - Pneumococcal vaccine 13 valent PCV13 IM (Prevnar) [89872]  - NO CHARGE LOS      Return in about 6 months (around 12/24/2021) for Lab Work.    Ewelina Rodriguez MD  [facility] DANA Becker is a 66 year old who presents for the following health issues     HPI     Diabetes Follow-up  Sugars are .   How often are you checking your blood sugar? One time daily  What time of day are you checking your blood sugars (select all that apply)?  before breakfast   Have you had any blood sugars above 200?  No  Have you had any blood sugars below 70?  No    What symptoms do you notice when your blood sugar is low?  None    What concerns do you have today about your diabetes? None     Do you have any of these symptoms? (Select all that apply)  No numbness or tingling in feet.  No redness, sores or blisters on feet.  No complaints of excessive thirst.  No reports of blurry vision.  No significant changes to weight.    Have you had a diabetic eye exam in the last 12 months? Yes- Date of last eye exam: Dec 28/2020,  Location: [facility] -[place] ,  [place]       Hyperlipidemia  Follow-Up      Are you regularly taking any medication or supplement to lower your cholesterol?   Yes- atorvastatin     Are you having muscle aches or other side effects that you think could be caused by your cholesterol lowering medication?  No    Hypertension Follow-up      Do you check your blood pressure regularly outside of the clinic? No     Are you following a low salt diet? No    Are your blood pressures ever more than 140 on the top number (systolic) OR more   than 90 on the bottom number (diastolic), for example 140/90? No        How many servings of fruits and vegetables do you eat daily?  4 or more    On average, how many sweetened beverages do you drink each day (Examples: soda, juice, sweet tea, etc.  Do NOT count diet or artificially sweetened beverages)?   0    How many days per week do you exercise enough to make your heart beat faster? 3 or less    How many minutes a day do you exercise enough to make your heart beat faster? 9 or less    How many days per week do you miss taking your medication? 0      Other problems:   Morbid obesity: weight down      Current concerns:   none    Patient Active Problem List   Diagnosis     Benign essential hypertension     Hyperlipidemia LDL goal <100     Morbid obesity (H)     Type 2 diabetes mellitus without complication, without long-term current use of insulin (H)       Current Outpatient Medications   Medication Sig Dispense Refill     amLODIPine (NORVASC) 10 MG tablet Take 1 tablet (10 mg) by mouth daily 90 tablet 3     atorvastatin (LIPITOR) 10 MG tablet Take 1 tablet (10 mg) by mouth daily 90 tablet 3     CONTOUR NEXT TEST test strip USE TO TEST BLOOD SUGAR 1 TIMES DAILY OR AS DIRECTED. 100 strip 3     latanoprost (XALATAN) 0.005 % ophthalmic solution 1 drop At Bedtime       lisinopril-hydrochlorothiazide (ZESTORETIC) 20-12.5 MG tablet Take 1 tablet by mouth daily 90 tablet 3     metFORMIN (GLUCOPHAGE) 500 MG tablet Take 2 tablets (1,000 mg) by mouth 2  "times daily (with meals) 360 tablet 2     thin (NO BRAND SPECIFIED) lancets Use with lanceting device once daily 100 each 3     timolol hemihydrate (BETIMOL) 0.5 % SOLN ophthalmic solution 1 drop 2 times daily         Social History     Tobacco Use     Smoking status: Never Smoker     Smokeless tobacco: Never Used   Substance Use Topics     Alcohol use: No     Drug use: No        ROS:  General: no fever, chills  Weight: down   Vision:negative. Last eye exam 12/20.  ENT: negative  Respiratory negative.  Cardiac: no chest pain or pressure  Abdominal: no nausea, vomiting, abdominal pain, bowel changes  Vascular no complaints of claudication  Neurologic:no complaints of neuropathy  Feet no lesions, in grown nails, edema   : no polyuria, hematuria, dysuria    Objective:  Patient alert in NAD  /70   Pulse 96   Temp 98.8  F (37.1  C) (Oral)   Resp 12   Ht 1.613 m (5' 3.5\")   Wt 108.4 kg (239 lb)   SpO2 97%   BMI 41.67 kg/m         Wt Readings from Last 4 Encounters:   06/24/21 108.4 kg (239 lb)   04/23/19 112.5 kg (248 lb)   10/30/18 110.7 kg (244 lb)   01/30/18 109.2 kg (240 lb 12.8 oz)       CV: CV: normal S1, S2 without murmur, S3 or S4.  Carotid pulses: full  LUNGS: clear  Feet: pulses full, normal capillary refill  No lesions, sores or skin changes  Nails normal  Sensation able to feel fine filament    Lab Results   Component Value Date    A1C 6.5 06/22/2021    A1C 6.7 04/23/2019    A1C 5.9 01/30/2018    A1C 6.0 02/03/2017    A1C 6.40 09/30/2015       Lab Results   Component Value Date    CHOL 147 06/22/2021    HDL 75 06/22/2021    LDL 47 06/22/2021    TRIG 124 06/22/2021                "

## 2021-07-20 ENCOUNTER — HOSPITAL ENCOUNTER (OUTPATIENT)
Dept: MAMMOGRAPHY | Facility: CLINIC | Age: 66
Discharge: HOME | End: 2021-07-20
Attending: INTERNAL MEDICINE | Admitting: INTERNAL MEDICINE
Payer: COMMERCIAL

## 2021-07-20 DIAGNOSIS — Z12.31 ENCOUNTER FOR SCREENING MAMMOGRAM FOR BREAST CANCER: ICD-10-CM

## 2021-07-20 PROCEDURE — 77063 BREAST TOMOSYNTHESIS BI: CPT

## 2022-07-10 DIAGNOSIS — E78.5 HYPERLIPIDEMIA LDL GOAL <100: ICD-10-CM

## 2022-07-10 DIAGNOSIS — I10 BENIGN ESSENTIAL HYPERTENSION: ICD-10-CM

## 2022-07-10 DIAGNOSIS — E11.9 TYPE 2 DIABETES MELLITUS WITHOUT COMPLICATION, WITHOUT LONG-TERM CURRENT USE OF INSULIN (H): ICD-10-CM

## 2022-07-12 RX ORDER — AMLODIPINE BESYLATE 10 MG/1
10 TABLET ORAL DAILY
Qty: 90 TABLET | Refills: 0 | Status: SHIPPED | OUTPATIENT
Start: 2022-07-12 | End: 2022-10-09

## 2022-07-12 RX ORDER — LISINOPRIL AND HYDROCHLOROTHIAZIDE 12.5; 2 MG/1; MG/1
1 TABLET ORAL DAILY
Qty: 90 TABLET | Refills: 0 | Status: SHIPPED | OUTPATIENT
Start: 2022-07-12 | End: 2022-10-09

## 2022-07-12 RX ORDER — ATORVASTATIN CALCIUM 10 MG/1
10 TABLET, FILM COATED ORAL DAILY
Qty: 90 TABLET | Refills: 0 | Status: SHIPPED | OUTPATIENT
Start: 2022-07-12 | End: 2022-09-27

## 2022-07-12 NOTE — TELEPHONE ENCOUNTER
Please advise the patient she needs to do her diabetes labs 3 to 4 days before her appointment with me.  I extended the orders.  Remind patient it is expected she would be seen every 6 months for diabetes follow-up.

## 2022-07-12 NOTE — TELEPHONE ENCOUNTER
Pending Prescriptions:                       Disp   Refills    lisinopril-hydrochlorothiazide (ZESTORETIC*90 tab*3        Sig: TAKE 1 TABLET BY MOUTH EVERY DAY    amLODIPine (NORVASC) 10 MG tablet [Pharmac*90 tab*3        Sig: TAKE 1 TABLET BY MOUTH EVERY DAY    metFORMIN (GLUCOPHAGE) 500 MG tablet [Phar*360 ta*2        Sig: TAKE 2 TABLETS BY MOUTH 2 TIMES DAILY WITH MEALS    atorvastatin (LIPITOR) 10 MG tablet [Pharm*90 tab*3        Sig: TAKE 1 TABLET BY MOUTH EVERY DAY    Routing refill request to provider for review/approval because:  Patient needs to be seen because it has been more than 1 year since last office visit.  BP Readings from Last 3 Encounters:   06/24/21 126/70   04/23/19 104/68   10/30/18 108/68     Next 5 appointments (look out 90 days)      Sep 27, 2022  9:30 AM  (Arrive by 9:10 AM)  Annual Wellness Visit with Ewelina Rodriguez MD  [facility] ([facility] ) [facility] [address] [facility] [id number] [phone]

## 2022-09-27 ENCOUNTER — OFFICE VISIT (OUTPATIENT)
Dept: INTERNAL MEDICINE | Facility: CLINIC | Age: 67
End: 2022-09-27
Payer: COMMERCIAL

## 2022-09-27 VITALS
DIASTOLIC BLOOD PRESSURE: 74 MMHG | HEART RATE: 91 BPM | TEMPERATURE: 98.7 F | WEIGHT: 234.7 LBS | RESPIRATION RATE: 12 BRPM | HEIGHT: 64 IN | BODY MASS INDEX: 40.07 KG/M2 | OXYGEN SATURATION: 96 % | SYSTOLIC BLOOD PRESSURE: 118 MMHG

## 2022-09-27 DIAGNOSIS — E66.01 MORBID OBESITY (H): ICD-10-CM

## 2022-09-27 DIAGNOSIS — E11.9 TYPE 2 DIABETES MELLITUS WITHOUT COMPLICATION, WITHOUT LONG-TERM CURRENT USE OF INSULIN (H): ICD-10-CM

## 2022-09-27 DIAGNOSIS — I10 BENIGN ESSENTIAL HYPERTENSION: ICD-10-CM

## 2022-09-27 DIAGNOSIS — E78.5 HYPERLIPIDEMIA LDL GOAL <100: ICD-10-CM

## 2022-09-27 DIAGNOSIS — Z23 NEED FOR PROPHYLACTIC VACCINATION AND INOCULATION AGAINST INFLUENZA: ICD-10-CM

## 2022-09-27 DIAGNOSIS — Z23 NEED FOR VACCINATION: ICD-10-CM

## 2022-09-27 DIAGNOSIS — Z00.00 ENCOUNTER FOR ROUTINE ADULT HEALTH EXAMINATION WITHOUT ABNORMAL FINDINGS: Primary | ICD-10-CM

## 2022-09-27 LAB — HBA1C MFR BLD: 6.7 % (ref 0–5.6)

## 2022-09-27 PROCEDURE — 82043 UR ALBUMIN QUANTITATIVE: CPT | Performed by: INTERNAL MEDICINE

## 2022-09-27 PROCEDURE — 90677 PCV20 VACCINE IM: CPT | Performed by: INTERNAL MEDICINE

## 2022-09-27 PROCEDURE — G0438 PPPS, INITIAL VISIT: HCPCS | Performed by: INTERNAL MEDICINE

## 2022-09-27 PROCEDURE — G0008 ADMIN INFLUENZA VIRUS VAC: HCPCS | Performed by: INTERNAL MEDICINE

## 2022-09-27 PROCEDURE — 80061 LIPID PANEL: CPT | Performed by: INTERNAL MEDICINE

## 2022-09-27 PROCEDURE — 80048 BASIC METABOLIC PNL TOTAL CA: CPT | Performed by: INTERNAL MEDICINE

## 2022-09-27 PROCEDURE — 90662 IIV NO PRSV INCREASED AG IM: CPT | Performed by: INTERNAL MEDICINE

## 2022-09-27 PROCEDURE — 99207 PR FOOT EXAM NO CHARGE: CPT | Performed by: INTERNAL MEDICINE

## 2022-09-27 PROCEDURE — 99214 OFFICE O/P EST MOD 30 MIN: CPT | Mod: 25 | Performed by: INTERNAL MEDICINE

## 2022-09-27 PROCEDURE — G0009 ADMIN PNEUMOCOCCAL VACCINE: HCPCS | Performed by: INTERNAL MEDICINE

## 2022-09-27 PROCEDURE — 83036 HEMOGLOBIN GLYCOSYLATED A1C: CPT | Performed by: INTERNAL MEDICINE

## 2022-09-27 PROCEDURE — 36415 COLL VENOUS BLD VENIPUNCTURE: CPT | Performed by: INTERNAL MEDICINE

## 2022-09-27 RX ORDER — ATORVASTATIN CALCIUM 10 MG/1
10 TABLET, FILM COATED ORAL DAILY
Qty: 90 TABLET | Refills: 3 | Status: SHIPPED | OUTPATIENT
Start: 2022-09-27 | End: 2023-08-28

## 2022-09-27 ASSESSMENT — ENCOUNTER SYMPTOMS
HEARTBURN: 0
EYE PAIN: 0
SORE THROAT: 0
BREAST MASS: 0
ABDOMINAL PAIN: 0
PARESTHESIAS: 0
HEADACHES: 0
ARTHRALGIAS: 1
FREQUENCY: 0
NERVOUS/ANXIOUS: 0
NAUSEA: 0
HEMATURIA: 0
COUGH: 0
HEMATOCHEZIA: 0
PALPITATIONS: 0
MYALGIAS: 0
WEAKNESS: 0
CONSTIPATION: 0
CHILLS: 0
FEVER: 0
DIARRHEA: 0
SHORTNESS OF BREATH: 0
DYSURIA: 0
DIZZINESS: 0

## 2022-09-27 ASSESSMENT — PATIENT HEALTH QUESTIONNAIRE - PHQ9
SUM OF ALL RESPONSES TO PHQ QUESTIONS 1-9: 6
10. IF YOU CHECKED OFF ANY PROBLEMS, HOW DIFFICULT HAVE THESE PROBLEMS MADE IT FOR YOU TO DO YOUR WORK, TAKE CARE OF THINGS AT HOME, OR GET ALONG WITH OTHER PEOPLE: NOT DIFFICULT AT ALL
SUM OF ALL RESPONSES TO PHQ QUESTIONS 1-9: 6

## 2022-09-27 ASSESSMENT — ACTIVITIES OF DAILY LIVING (ADL): CURRENT_FUNCTION: NO ASSISTANCE NEEDED

## 2022-09-27 NOTE — Clinical Note
Please abstract the following data from this visit with this patient into the appropriate field in Epic:  Tests that can be patient reported without a hard copy:  Eye exam with ophthalmology on this date: 4/15/22

## 2022-09-27 NOTE — PROGRESS NOTES
SUBJECTIVE:   Rosita is a 67 year old who presents for Preventive Visit.      Patient has been advised of split billing requirements and indicates understanding: Yes  Are you in the first 12 months of your Medicare coverage?  No    Healthy Habits:   PHQ-2 Total Score: 2        Diabetes Follow-up    How often are you checking your blood sugar? A few times a month  What time of day are you checking your blood sugars (select all that apply)?  Before meals  Have you had any blood sugars above 200?  No  Have you had any blood sugars below 70?  No    What symptoms do you notice when your blood sugar is low?  None    What concerns do you have today about your diabetes? None     Do you have any of these symptoms? (Select all that apply)  No numbness or tingling in feet.  No redness, sores or blisters on feet.  No complaints of excessive thirst.  No reports of blurry vision.  No significant changes to weight.    Have you had a diabetic eye exam in the last 12 months?  She had an eye exam in April      Hyperlipidemia Follow-Up      Are you regularly taking any medication or supplement to lower your cholesterol?   Yes Atorvastatin     Are you having muscle aches or other side effects that you think could be caused by your cholesterol lowering medication?  No    Hypertension: Does not monitor blood pressures at home    Morbid obesity: Weight has decreased significantly.       Other concerns: She reports she has significant stress because she is caregiver for her   Who has MS and dementia.  Her 2 sons are not really available, she is concerned he may need some higher level of care soon and does not quite know how to get that arranged.    Patient Active Problem List   Diagnosis     Benign essential hypertension     Hyperlipidemia LDL goal <100     Morbid obesity (H)     Type 2 diabetes mellitus without complication, without long-term current use of insulin (H)     Current Outpatient Medications   Medication Sig Dispense  Refill     amLODIPine (NORVASC) 10 MG tablet Take 1 tablet (10 mg) by mouth daily 90 tablet 1     atorvastatin (LIPITOR) 10 MG tablet Take 1 tablet (10 mg) by mouth daily 90 tablet 3     blood glucose (CONTOUR NEXT TEST) test strip 1 strip by In Vitro route daily 100 strip 3     latanoprost (XALATAN) 0.005 % ophthalmic solution 1 drop At Bedtime       lisinopril-hydrochlorothiazide (ZESTORETIC) 20-12.5 MG tablet Take 1 tablet by mouth daily 90 tablet 1     metFORMIN (GLUCOPHAGE) 500 MG tablet Take 2 tablets (1,000 mg) by mouth 2 times daily (with meals) 360 tablet 1     thin (NO BRAND SPECIFIED) lancets Use with lanceting device once daily 100 each 3     timolol hemihydrate (BETIMOL) 0.5 % SOLN ophthalmic solution 1 drop 2 times daily                  Do you feel safe in your environment? Yes    Have you ever done Advance Care Planning? (For example, a Health Directive, POLST, or a discussion with a medical provider or your loved ones about your wishes): Yes, patient states has an Advance Care Planning document and will bring a copy to the clinic.       Fall risk  Fallen 2 or more times in the past year?: No  Any fall with injury in the past year?: No    Cognitive Screening   1) Repeat 3 items (Leader, Season, Table)    2) Clock draw: NORMAL  3) 3 item recall:   Recalls 3 objects  Results: 3 items recalled: COGNITIVE IMPAIRMENT LESS LIKELY    Mini-CogTM Copyright LAURO Ames. Licensed by the author for use in [facility]; reprinted with permission ([email or web address]). All rights reserved.      Do you have sleep apnea, excessive snoring or daytime drowsiness?: no    Reviewed and updated as needed this visit by clinical staff   Tobacco  Allergies  Meds   Med Hx  Surg Hx  Fam Hx  Soc Hx          Reviewed and updated as needed this visit by Provider                   Social History     Tobacco Use     Smoking status: Never Smoker     Smokeless tobacco: Never Used   Substance Use Topics     Alcohol use: No      If you drink alcohol do you typically have >3 drinks per day or >7 drinks per week? No    Alcohol Use 9/27/2022   Prescreen: >3 drinks/day or >7 drinks/week? Not Applicable             Current providers sharing in care for this patient include:   Patient Care Team:  Ewelina Rodriguez MD as PCP - General (Internal Medicine)  Ewelina Rodriguez MD as Assigned PCP    The following health maintenance items are reviewed in Epic and correct as of today:  Health Maintenance   Topic Date Due     DEXA  Never done     ADVANCE CARE PLANNING  Never done     ZOSTER IMMUNIZATION (1 of 2) Never done     MEDICARE ANNUAL WELLNESS VISIT  Never done     A1C  09/22/2021     EYE EXAM  12/28/2021     COLORECTAL CANCER SCREENING  01/01/2022     COVID-19 Vaccine (4 - Booster for Moderna series) 01/06/2022     BMP  06/22/2022     LIPID  06/22/2022     MICROALBUMIN  06/22/2022     DIABETIC FOOT EXAM  06/30/2022     ANNUAL REVIEW OF HM ORDERS  06/30/2022     INFLUENZA VACCINE (1) 09/01/2022     Pneumococcal Vaccine: 65+ Years (2 - PPSV23 or PCV20) 06/24/2022     MAMMO SCREENING  07/20/2023     FALL RISK ASSESSMENT  09/27/2023     DTAP/TDAP/TD IMMUNIZATION (4 - Td or Tdap) 02/03/2027     HEPATITIS C SCREENING  Completed     PHQ-2 (once per calendar year)  Completed     IPV IMMUNIZATION  Aged Out     MENINGITIS IMMUNIZATION  Aged Out         Any new diagnosis of family breast, ovarian, or bowel cancer?    FHS-7:   Breast CA Risk Assessment (FHS-7) 7/20/2021   Did any of your first-degree relatives have breast or ovarian cancer? No   Did any of your relatives have bilateral breast cancer? No   Did any man in your family have breast cancer? No   Did any woman in your family have breast and ovarian cancer? No   Did any woman in your family have breast cancer before age 50 y? No   Do you have 2 or more relatives with breast and/or ovarian cancer? No   Do you have 2 or more relatives with breast and/or bowel cancer? No         Pertinent mammograms are  "reviewed under the imaging tab.    Review of Systems  CONSTITUTIONAL: NEGATIVE for fever, chills, change in weight  INTEGUMENTARY/SKIN: NEGATIVE for worrisome rashes, moles or lesions  EYES: NEGATIVE for vision changes or irritation  ENT/MOUTH: NEGATIVE for ear, mouth and throat problems  RESP: NEGATIVE for significant cough or SOB  BREAST: NEGATIVE for masses, tenderness or discharge  CV: NEGATIVE for chest pain, palpitations or peripheral edema  GI: NEGATIVE for nausea, abdominal pain, heartburn, or change in bowel habits  : NEGATIVE for frequency, dysuria, or hematuria  MUSCULOSKELETAL: NEGATIVE for significant arthralgias or myalgia  NEURO: NEGATIVE for weakness, dizziness or paresthesias  ENDOCRINE: NEGATIVE for temperature intolerance, skin/hair changes  HEME: NEGATIVE for bleeding problems  PSYCHIATRIC: NEGATIVE for changes in mood or affect    OBJECTIVE:   /74   Pulse 91   Temp 98.7  F (37.1  C) (Oral)   Resp 12   Ht 1.613 m (5' 3.5\")   Wt 106.5 kg (234 lb 11.2 oz)   SpO2 96%   BMI 40.92 kg/m   Estimated body mass index is 40.92 kg/m  as calculated from the following:    Height as of this encounter: 1.613 m (5' 3.5\").    Weight as of this encounter: 106.5 kg (234 lb 11.2 oz).  Physical Exam    HEENT: extraocular movements are intact, pupils equal and reactive to light and accommodation, TMs clear  NECK: Neck supple. No adenopathy. Thyroid symmetric, normal size,, Carotids without bruits.  PULMONARY: clear to auscultation  CARDIAC: regular rate and rhythm and no murmurs, clicks, or gallops  PULSES: 2/2 throughout  BACK: no spinal or CVAT  ABDOMINAL: Soft, nontender.  Normal bowel sounds.  No hepatosplenomegaly or abnormal masses  BREAST: No breast masses or tenderness, No axillary masses or tenderness and No galactorrhea  REFLEXES: 2+ throughout  Feet: Good pulses, able to feel light touch, nails normal          ASSESSMENT / PLAN:   (Z00.00) Encounter for routine adult health examination " without abnormal findings  (primary encounter diagnosis)  Comment: She declines shingles and COVID vaccines.  Recommend colon screening, she will check to see if insurance covers the Cologuard and will contact me to order the test if it is.  Advised about care coordination.  I will send a message to her 's primary provider to consider referral  Plan:     (E11.9) Type 2 diabetes mellitus without complication, without long-term current use of insulin (H)  Comment: Has not had labs yet, will need to check for control  Plan: metFORMIN (GLUCOPHAGE) 500 MG tablet, thin (NO         BRAND SPECIFIED) lancets, blood glucose         (CONTOUR NEXT TEST) test strip, Basic metabolic        panel  (Ca, Cl, CO2, Creat, Gluc, K, Na, BUN),         Albumin Random Urine Quantitative with Creat         Ratio, Hemoglobin A1c, FOOT EXAM            (E66.01) Morbid obesity (H)  Comment: She has been advised that morbid obesity is associated with health risks and losing weight could help improve her health including improving health problems including diabetes, hypertension.    Plan: Continue working on weight loss    (I10) Benign essential hypertension  Comment: Well-controlled, continue medication  Plan: amLODIPine (NORVASC) 10 MG tablet,         lisinopril-hydrochlorothiazide (ZESTORETIC)         20-12.5 MG tablet, Basic metabolic panel  (Ca,         Cl, CO2, Creat, Gluc, K, Na, BUN), Albumin         Random Urine Quantitative with Creat Ratio            (E78.5) Hyperlipidemia LDL goal <100  Comment: Reassess labs today  Plan: atorvastatin (LIPITOR) 10 MG tablet, Lipid         panel reflex to direct LDL Fasting            (Z23) Need for prophylactic vaccination and inoculation against influenza  Comment:   Plan: INFLUENZA, QUAD, HIGH DOSE, PF, 65YR + (FLUZONE        HD), Pneumococcal 20 Valent Conjugate (Prevnar         20)            (Z23) Need for vaccination  Comment:   Plan:     Patient has been advised of split billing  "requirements and indicates understanding: Yes    COUNSELING:  Reviewed preventive health counseling, as reflected in patient instructions    Estimated body mass index is 40.92 kg/m  as calculated from the following:    Height as of this encounter: 1.613 m (5' 3.5\").    Weight as of this encounter: 106.5 kg (234 lb 11.2 oz).    Weight management plan: Discussed healthy diet and exercise guidelines    She reports that she has never smoked. She has never used smokeless tobacco.      Appropriate preventive services were discussed with this patient, including applicable screening as appropriate for cardiovascular disease, diabetes, osteopenia/osteoporosis, and glaucoma.  As appropriate for age/gender, discussed screening for colorectal cancer, prostate cancer, breast cancer, and cervical cancer. Checklist reviewing preventive services available has been given to the patient.    Reviewed patients plan of care and provided an AVS. The Basic Care Plan (routine screening as documented in Health Maintenance) for Rosita meets the Care Plan requirement. This Care Plan has been established and reviewed with the Patient.    Counseling Resources:  ATP IV Guidelines  Pooled Cohorts Equation Calculator  Breast Cancer Risk Calculator  Breast Cancer: Medication to Reduce Risk  FRAX Risk Assessment  ICSI Preventive Guidelines  Dietary Guidelines for Americans, 2010  [employer]'s MyPlate  ASA Prophylaxis  Lung CA Screening    Ewelina Rodriguez MD  [facility]    Identified Health Risks:  "

## 2022-09-28 LAB
ANION GAP SERPL CALCULATED.3IONS-SCNC: 6 MMOL/L (ref 3–14)
BUN SERPL-MCNC: 17 MG/DL (ref 7–30)
CALCIUM SERPL-MCNC: 9.1 MG/DL (ref 8.5–10.1)
CHLORIDE BLD-SCNC: 102 MMOL/L (ref 94–109)
CHOLEST SERPL-MCNC: 152 MG/DL
CO2 SERPL-SCNC: 27 MMOL/L (ref 20–32)
CREAT SERPL-MCNC: 0.78 MG/DL (ref 0.52–1.04)
CREAT UR-MCNC: 62 MG/DL
FASTING STATUS PATIENT QL REPORTED: YES
GFR SERPL CREATININE-BSD FRML MDRD: 83 ML/MIN/1.73M2
GLUCOSE BLD-MCNC: 141 MG/DL (ref 70–99)
HDLC SERPL-MCNC: 71 MG/DL
LDLC SERPL CALC-MCNC: 58 MG/DL
MICROALBUMIN UR-MCNC: <5 MG/L
MICROALBUMIN/CREAT UR: NORMAL MG/G{CREAT}
NONHDLC SERPL-MCNC: 81 MG/DL
POTASSIUM BLD-SCNC: 4.4 MMOL/L (ref 3.4–5.3)
SODIUM SERPL-SCNC: 135 MMOL/L (ref 133–144)
TRIGL SERPL-MCNC: 113 MG/DL

## 2022-10-09 RX ORDER — AMLODIPINE BESYLATE 10 MG/1
10 TABLET ORAL DAILY
Qty: 90 TABLET | Refills: 1 | Status: SHIPPED | OUTPATIENT
Start: 2022-10-09 | End: 2023-04-05

## 2022-10-09 RX ORDER — LANCETS
EACH MISCELLANEOUS
Qty: 100 EACH | Refills: 3 | Status: SHIPPED | OUTPATIENT
Start: 2022-10-09 | End: 2023-10-20

## 2022-10-09 RX ORDER — LISINOPRIL AND HYDROCHLOROTHIAZIDE 12.5; 2 MG/1; MG/1
1 TABLET ORAL DAILY
Qty: 90 TABLET | Refills: 1 | Status: SHIPPED | OUTPATIENT
Start: 2022-10-09 | End: 2023-04-05

## 2023-04-04 DIAGNOSIS — I10 BENIGN ESSENTIAL HYPERTENSION: ICD-10-CM

## 2023-04-05 RX ORDER — LISINOPRIL AND HYDROCHLOROTHIAZIDE 12.5; 2 MG/1; MG/1
TABLET ORAL
Qty: 90 TABLET | Refills: 1 | Status: SHIPPED | OUTPATIENT
Start: 2023-04-05 | End: 2023-08-28

## 2023-04-05 RX ORDER — AMLODIPINE BESYLATE 10 MG/1
10 TABLET ORAL DAILY
Qty: 90 TABLET | Refills: 1 | Status: SHIPPED | OUTPATIENT
Start: 2023-04-05 | End: 2023-08-28

## 2023-05-15 ENCOUNTER — TRANSFERRED RECORDS (OUTPATIENT)
Dept: MULTI SPECIALTY CLINIC | Facility: CLINIC | Age: 68
End: 2023-05-15

## 2023-05-15 LAB — RETINOPATHY: NEGATIVE

## 2023-06-20 ENCOUNTER — PATIENT OUTREACH (OUTPATIENT)
Dept: CARE COORDINATION | Facility: CLINIC | Age: 68
End: 2023-06-20
Payer: COMMERCIAL

## 2023-07-14 ENCOUNTER — LAB (OUTPATIENT)
Dept: LAB | Facility: CLINIC | Age: 68
End: 2023-07-14
Payer: COMMERCIAL

## 2023-07-14 DIAGNOSIS — E11.9 TYPE 2 DIABETES MELLITUS WITHOUT COMPLICATION, WITHOUT LONG-TERM CURRENT USE OF INSULIN (H): Primary | ICD-10-CM

## 2023-07-14 LAB — HBA1C MFR BLD: 6.7 % (ref 0–5.6)

## 2023-07-14 PROCEDURE — 36415 COLL VENOUS BLD VENIPUNCTURE: CPT

## 2023-07-14 PROCEDURE — 83036 HEMOGLOBIN GLYCOSYLATED A1C: CPT

## 2023-07-18 ENCOUNTER — PATIENT OUTREACH (OUTPATIENT)
Dept: CARE COORDINATION | Facility: CLINIC | Age: 68
End: 2023-07-18
Payer: COMMERCIAL

## 2023-07-27 ENCOUNTER — VIRTUAL VISIT (OUTPATIENT)
Dept: INTERNAL MEDICINE | Facility: CLINIC | Age: 68
End: 2023-07-27
Payer: COMMERCIAL

## 2023-07-27 DIAGNOSIS — E66.01 MORBID OBESITY (H): ICD-10-CM

## 2023-07-27 DIAGNOSIS — E11.9 TYPE 2 DIABETES MELLITUS WITHOUT COMPLICATION, WITHOUT LONG-TERM CURRENT USE OF INSULIN (H): Primary | ICD-10-CM

## 2023-07-27 PROCEDURE — 99214 OFFICE O/P EST MOD 30 MIN: CPT | Mod: 95

## 2023-07-27 NOTE — PROGRESS NOTES
"Rosita is a 68 year old who is being evaluated via a billable telephone visit.      What phone number would you like to be contacted at? [phone]  How would you like to obtain your AVS? Hugo    Distant Location (provider location):  On-site    Assessment & Plan     (E11.9) Type 2 diabetes mellitus without complication, without long-term current use of insulin (H)  (primary encounter diagnosis)  Comment: A1C was 6.7%.  BG at home usually between 110-124.   She is interested in starting Ozempic to help with weight loss.   Denies any history of pancreatitis or gastroparesis.   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   We will start Ozempic 0.25MG and she will let me know how she is tolerating this in about 3 weeks.   Discussed common side effects of medication today.  Plan: semaglutide (OZEMPIC) 2 MG/3ML pen            (E66.01) Morbid obesity (H)  Comment: BMI of 40.92 as of 9/27/22. Ozempic will be helpful for both diabetes and weight loss.   Plan: semaglutide (OZEMPIC) 2 MG/3ML pen                   BMI:   Estimated body mass index is 40.92 kg/m  as calculated from the following:    Height as of 9/27/22: 1.613 m (5' 3.5\").    Weight as of 9/27/22: 106.5 kg (234 lb 11.2 oz).   Weight management plan: Discussed healthy diet and exercise guidelines        Melonie Stark, APRN CNP  [facility]      Roberto Becker is a 68 year old, presenting for the following health issues:  No chief complaint on file.      HPI     Wants to discus ozempic , weight loss         Objective           Vitals:  No vitals were obtained today due to virtual visit.    Physical Exam   healthy, alert, and no distress  PSYCH: Alert and oriented times 3; coherent speech, normal   rate and volume, able to articulate logical thoughts, able   to abstract reason, no tangential thoughts, no hallucinations   or delusions  Her affect is normal  RESP: No cough, no " audible wheezing, able to talk in full sentences  Remainder of exam unable to be completed due to telephone visits                Phone call duration: 10 minutes

## 2023-08-10 DIAGNOSIS — E11.9 TYPE 2 DIABETES MELLITUS WITHOUT COMPLICATION, WITHOUT LONG-TERM CURRENT USE OF INSULIN (H): ICD-10-CM

## 2023-08-10 NOTE — TELEPHONE ENCOUNTER
Pending Prescriptions:                       Disp   Refills    metFORMIN (GLUCOPHAGE) 500 MG tablet [Pha*360 ta*0            Sig: TAKE 2 TABLETS BY MOUTH 2 TIMES DAILY WITH MEALS    Medication is being filled for 1 time refill only due to:   patient has a future appointment scheduled.

## 2023-08-24 ENCOUNTER — MYC MEDICAL ADVICE (OUTPATIENT)
Dept: INTERNAL MEDICINE | Facility: CLINIC | Age: 68
End: 2023-08-24
Payer: COMMERCIAL

## 2023-08-25 ENCOUNTER — MYC MEDICAL ADVICE (OUTPATIENT)
Dept: INTERNAL MEDICINE | Facility: CLINIC | Age: 68
End: 2023-08-25
Payer: COMMERCIAL

## 2023-08-25 DIAGNOSIS — E66.01 MORBID OBESITY (H): ICD-10-CM

## 2023-08-25 DIAGNOSIS — E11.9 TYPE 2 DIABETES MELLITUS WITHOUT COMPLICATION, WITHOUT LONG-TERM CURRENT USE OF INSULIN (H): ICD-10-CM

## 2023-08-25 NOTE — TELEPHONE ENCOUNTER
Sent [employer] message to patient. Appointment cancelled per patient request.    Darren Green, Triage RN [place]  9:58 AM 8/25/2023 .

## 2023-08-25 NOTE — TELEPHONE ENCOUNTER
Sent [employer] message to patient.    Darren Green, Triage RN [place]  10:00 AM 8/25/2023

## 2023-08-26 DIAGNOSIS — E78.5 HYPERLIPIDEMIA LDL GOAL <100: ICD-10-CM

## 2023-08-26 DIAGNOSIS — I10 BENIGN ESSENTIAL HYPERTENSION: ICD-10-CM

## 2023-08-28 ENCOUNTER — PATIENT OUTREACH (OUTPATIENT)
Dept: CARE COORDINATION | Facility: CLINIC | Age: 68
End: 2023-08-28
Payer: COMMERCIAL

## 2023-08-28 RX ORDER — LISINOPRIL AND HYDROCHLOROTHIAZIDE 12.5; 2 MG/1; MG/1
TABLET ORAL
Qty: 90 TABLET | Refills: 0 | Status: SHIPPED | OUTPATIENT
Start: 2023-08-28 | End: 2023-10-20

## 2023-08-28 RX ORDER — AMLODIPINE BESYLATE 10 MG/1
10 TABLET ORAL DAILY
Qty: 90 TABLET | Refills: 0 | Status: SHIPPED | OUTPATIENT
Start: 2023-08-28 | End: 2023-10-20

## 2023-08-28 RX ORDER — ATORVASTATIN CALCIUM 10 MG/1
10 TABLET, FILM COATED ORAL DAILY
Qty: 90 TABLET | Refills: 0 | Status: SHIPPED | OUTPATIENT
Start: 2023-08-28 | End: 2023-10-20

## 2023-08-29 ENCOUNTER — MYC REFILL (OUTPATIENT)
Dept: INTERNAL MEDICINE | Facility: CLINIC | Age: 68
End: 2023-08-29
Payer: COMMERCIAL

## 2023-08-29 DIAGNOSIS — E11.9 TYPE 2 DIABETES MELLITUS WITHOUT COMPLICATION, WITHOUT LONG-TERM CURRENT USE OF INSULIN (H): ICD-10-CM

## 2023-08-29 DIAGNOSIS — E66.01 MORBID OBESITY (H): ICD-10-CM

## 2023-08-30 NOTE — TELEPHONE ENCOUNTER
Received call from patient to check on refill status. Patient says she is taking 0.5 mg now which started on 08/17/2023. It says she only has enough left for one dose.    Thank you,  Darren Green, Triage RN [facility]  8:53 AM 8/30/2023

## 2023-09-22 ENCOUNTER — MYC REFILL (OUTPATIENT)
Dept: INTERNAL MEDICINE | Facility: CLINIC | Age: 68
End: 2023-09-22
Payer: COMMERCIAL

## 2023-09-22 DIAGNOSIS — E11.9 TYPE 2 DIABETES MELLITUS WITHOUT COMPLICATION, WITHOUT LONG-TERM CURRENT USE OF INSULIN (H): ICD-10-CM

## 2023-09-22 DIAGNOSIS — E66.01 MORBID OBESITY (H): ICD-10-CM

## 2023-09-23 ENCOUNTER — HEALTH MAINTENANCE LETTER (OUTPATIENT)
Age: 68
End: 2023-09-23

## 2023-09-25 RX ORDER — SEMAGLUTIDE 0.68 MG/ML
0.5 INJECTION, SOLUTION SUBCUTANEOUS
OUTPATIENT
Start: 2023-09-25

## 2023-09-28 ENCOUNTER — ANCILLARY ORDERS (OUTPATIENT)
Dept: INTERNAL MEDICINE | Facility: CLINIC | Age: 68
End: 2023-09-28

## 2023-09-28 ENCOUNTER — HOSPITAL ENCOUNTER (OUTPATIENT)
Dept: MAMMOGRAPHY | Facility: CLINIC | Age: 68
Discharge: HOME | End: 2023-09-28
Attending: INTERNAL MEDICINE | Admitting: INTERNAL MEDICINE
Payer: COMMERCIAL

## 2023-09-28 DIAGNOSIS — Z12.31 VISIT FOR SCREENING MAMMOGRAM: ICD-10-CM

## 2023-09-28 PROCEDURE — 77067 SCR MAMMO BI INCL CAD: CPT

## 2023-10-12 ENCOUNTER — TELEPHONE (OUTPATIENT)
Dept: INTERNAL MEDICINE | Facility: CLINIC | Age: 68
End: 2023-10-12
Payer: COMMERCIAL

## 2023-10-12 DIAGNOSIS — E11.9 TYPE 2 DIABETES MELLITUS WITHOUT COMPLICATION, WITHOUT LONG-TERM CURRENT USE OF INSULIN (H): ICD-10-CM

## 2023-10-12 DIAGNOSIS — E66.01 MORBID OBESITY (H): ICD-10-CM

## 2023-10-12 RX ORDER — SEMAGLUTIDE 0.68 MG/ML
0.5 INJECTION, SOLUTION SUBCUTANEOUS
OUTPATIENT
Start: 2023-10-12

## 2023-10-12 NOTE — TELEPHONE ENCOUNTER
See primary care provider's message below.    Left message for patient to call back (regarding the Ozempic refill request from the pharmacy).

## 2023-10-12 NOTE — TELEPHONE ENCOUNTER
Is she tolerating this dose okay? We should increase it to next dose if she is tolerating this okay?

## 2023-10-16 NOTE — TELEPHONE ENCOUNTER
Patient states she is currently tolerating 0.5 dose.     Patient/Caregiver provided printed discharge information.

## 2023-10-20 ENCOUNTER — OFFICE VISIT (OUTPATIENT)
Dept: INTERNAL MEDICINE | Facility: CLINIC | Age: 68
End: 2023-10-20
Payer: COMMERCIAL

## 2023-10-20 ENCOUNTER — LAB (OUTPATIENT)
Dept: INTERNAL MEDICINE | Facility: CLINIC | Age: 68
End: 2023-10-20

## 2023-10-20 VITALS
TEMPERATURE: 97.1 F | SYSTOLIC BLOOD PRESSURE: 132 MMHG | WEIGHT: 233 LBS | DIASTOLIC BLOOD PRESSURE: 75 MMHG | BODY MASS INDEX: 39.78 KG/M2 | HEART RATE: 79 BPM | HEIGHT: 64 IN | RESPIRATION RATE: 16 BRPM

## 2023-10-20 DIAGNOSIS — I10 BENIGN ESSENTIAL HYPERTENSION: ICD-10-CM

## 2023-10-20 DIAGNOSIS — Z12.11 SCREEN FOR COLON CANCER: ICD-10-CM

## 2023-10-20 DIAGNOSIS — Z78.0 ENCOUNTER FOR OSTEOPOROSIS SCREENING IN ASYMPTOMATIC POSTMENOPAUSAL PATIENT: ICD-10-CM

## 2023-10-20 DIAGNOSIS — E78.5 HYPERLIPIDEMIA LDL GOAL <100: ICD-10-CM

## 2023-10-20 DIAGNOSIS — E66.01 MORBID OBESITY (H): ICD-10-CM

## 2023-10-20 DIAGNOSIS — E11.9 TYPE 2 DIABETES MELLITUS WITHOUT COMPLICATION, WITHOUT LONG-TERM CURRENT USE OF INSULIN (H): ICD-10-CM

## 2023-10-20 DIAGNOSIS — Z13.820 ENCOUNTER FOR OSTEOPOROSIS SCREENING IN ASYMPTOMATIC POSTMENOPAUSAL PATIENT: ICD-10-CM

## 2023-10-20 DIAGNOSIS — Z13.0 SCREENING FOR IRON DEFICIENCY ANEMIA: ICD-10-CM

## 2023-10-20 DIAGNOSIS — Z00.00 ENCOUNTER FOR MEDICARE ANNUAL WELLNESS EXAM: Primary | ICD-10-CM

## 2023-10-20 LAB
ANION GAP SERPL CALCULATED.3IONS-SCNC: 13 MMOL/L (ref 7–15)
BUN SERPL-MCNC: 20.1 MG/DL (ref 8–23)
CALCIUM SERPL-MCNC: 9.7 MG/DL (ref 8.8–10.2)
CHLORIDE SERPL-SCNC: 100 MMOL/L (ref 98–107)
CHOLEST SERPL-MCNC: 145 MG/DL
CREAT SERPL-MCNC: 0.82 MG/DL (ref 0.51–0.95)
CREAT UR-MCNC: 214 MG/DL
DEPRECATED HCO3 PLAS-SCNC: 25 MMOL/L (ref 22–29)
EGFRCR SERPLBLD CKD-EPI 2021: 77 ML/MIN/1.73M2
ERYTHROCYTE [DISTWIDTH] IN BLOOD BY AUTOMATED COUNT: 13.9 % (ref 10–15)
GLUCOSE SERPL-MCNC: 128 MG/DL (ref 70–99)
HBA1C MFR BLD: 6.1 % (ref 0–5.6)
HCT VFR BLD AUTO: 38.5 % (ref 35–47)
HDLC SERPL-MCNC: 60 MG/DL
HGB BLD-MCNC: 12.8 G/DL (ref 11.7–15.7)
LDLC SERPL CALC-MCNC: 66 MG/DL
MCH RBC QN AUTO: 28.4 PG (ref 26.5–33)
MCHC RBC AUTO-ENTMCNC: 33.2 G/DL (ref 31.5–36.5)
MCV RBC AUTO: 86 FL (ref 78–100)
MICROALBUMIN UR-MCNC: 15.6 MG/L
MICROALBUMIN/CREAT UR: 7.29 MG/G CR (ref 0–25)
NONHDLC SERPL-MCNC: 85 MG/DL
PLATELET # BLD AUTO: 413 10E3/UL (ref 150–450)
POTASSIUM SERPL-SCNC: 4.2 MMOL/L (ref 3.4–5.3)
RBC # BLD AUTO: 4.5 10E6/UL (ref 3.8–5.2)
SODIUM SERPL-SCNC: 138 MMOL/L (ref 135–145)
TRIGL SERPL-MCNC: 97 MG/DL
WBC # BLD AUTO: 6.8 10E3/UL (ref 4–11)

## 2023-10-20 PROCEDURE — 85027 COMPLETE CBC AUTOMATED: CPT

## 2023-10-20 PROCEDURE — 36415 COLL VENOUS BLD VENIPUNCTURE: CPT

## 2023-10-20 PROCEDURE — 83036 HEMOGLOBIN GLYCOSYLATED A1C: CPT

## 2023-10-20 PROCEDURE — 82043 UR ALBUMIN QUANTITATIVE: CPT

## 2023-10-20 PROCEDURE — 80048 BASIC METABOLIC PNL TOTAL CA: CPT

## 2023-10-20 PROCEDURE — 99207 PR FOOT EXAM NO CHARGE: CPT

## 2023-10-20 PROCEDURE — 82570 ASSAY OF URINE CREATININE: CPT

## 2023-10-20 PROCEDURE — G0439 PPPS, SUBSEQ VISIT: HCPCS

## 2023-10-20 PROCEDURE — 80061 LIPID PANEL: CPT

## 2023-10-20 PROCEDURE — 99214 OFFICE O/P EST MOD 30 MIN: CPT | Mod: 25

## 2023-10-20 RX ORDER — LANCETS
EACH MISCELLANEOUS
Qty: 100 EACH | Refills: 3 | Status: ON HOLD | OUTPATIENT
Start: 2023-10-20 | End: 2023-11-19

## 2023-10-20 RX ORDER — AMLODIPINE BESYLATE 10 MG/1
10 TABLET ORAL DAILY
Qty: 90 TABLET | Refills: 0 | Status: ON HOLD | OUTPATIENT
Start: 2023-10-20 | End: 2023-11-25

## 2023-10-20 RX ORDER — LISINOPRIL AND HYDROCHLOROTHIAZIDE 12.5; 2 MG/1; MG/1
1 TABLET ORAL DAILY
Qty: 90 TABLET | Refills: 0 | Status: ON HOLD | OUTPATIENT
Start: 2023-10-20 | End: 2023-11-25

## 2023-10-20 RX ORDER — ATORVASTATIN CALCIUM 10 MG/1
10 TABLET, FILM COATED ORAL DAILY
Qty: 90 TABLET | Refills: 3 | Status: SHIPPED | OUTPATIENT
Start: 2023-10-20 | End: 2024-10-04

## 2023-10-20 ASSESSMENT — ENCOUNTER SYMPTOMS
MYALGIAS: 0
SORE THROAT: 0
CONSTIPATION: 1
DYSURIA: 0
ARTHRALGIAS: 0
HEADACHES: 0
DIARRHEA: 0
WEAKNESS: 0
EYE PAIN: 0
HEMATOCHEZIA: 0
JOINT SWELLING: 0
COUGH: 0
HEARTBURN: 0
ABDOMINAL PAIN: 0
SHORTNESS OF BREATH: 0
NAUSEA: 0
PALPITATIONS: 0
BREAST MASS: 0
DIZZINESS: 0
PARESTHESIAS: 0
NERVOUS/ANXIOUS: 0
CHILLS: 0
FREQUENCY: 0
HEMATURIA: 0
FEVER: 0

## 2023-10-20 ASSESSMENT — ACTIVITIES OF DAILY LIVING (ADL): CURRENT_FUNCTION: NO ASSISTANCE NEEDED

## 2023-10-20 NOTE — PROGRESS NOTES
"SUBJECTIVE:   Rosita is a 68 year old who presents for Preventive Visit.      10/20/2023     9:01 AM   Additional Questions   Roomed by robert       Are you in the first 12 months of your Medicare coverage?  No    Healthy Habits:     In general, how would you rate your overall health?  Good    Frequency of exercise:  None    Do you usually eat at least 4 servings of fruit and vegetables a day, include whole grains    & fiber and avoid regularly eating high fat or \"junk\" foods?  Yes    Taking medications regularly:  Yes    Medication side effects:  None    Ability to successfully perform activities of daily living:  No assistance needed    Home Safety:  No safety concerns identified    Hearing Impairment:  No hearing concerns    In the past 6 months, have you been bothered by leaking of urine?  No    In general, how would you rate your overall mental or emotional health?  Excellent    Additional concerns today:  No      Today's PHQ-2 Score:       10/20/2023     8:58 AM   PHQ-2 ( 1999 [employer])   Q1: Little interest or pleasure in doing things 0   Q2: Feeling down, depressed or hopeless 0   PHQ-2 Score 0   Q1: Little interest or pleasure in doing things Not at all   Q2: Feeling down, depressed or hopeless Not at all   PHQ-2 Score 0           Have you ever done Advance Care Planning? (For example, a Health Directive, POLST, or a discussion with a medical provider or your loved ones about your wishes): No, advance care planning information given to patient to review.  Patient declined advance care planning discussion at this time.         Fall risk  Fallen 2 or more times in the past year?: No  Any fall with injury in the past year?: No    Cognitive Screening   1) Repeat 3 items (Leader, Season, Table)    2) Clock draw: NORMAL  3) 3 item recall:   Recalls 3 objects  Results: 3 items recalled: COGNITIVE IMPAIRMENT LESS LIKELY    Mini-CogTM Copyright S Hui. Licensed by the author for use in [facility]; " reprinted with permission (soob@.[email or web address]). All rights reserved.      Do you have sleep apnea, excessive snoring or daytime drowsiness? : no    Reviewed and updated as needed this visit by clinical staff   Tobacco  Allergies  Meds  Problems  Med Hx  Surg Hx  Fam Hx  Soc   Hx        Reviewed and updated as needed this visit by Provider                 Social History     Tobacco Use    Smoking status: Never    Smokeless tobacco: Never   Substance Use Topics    Alcohol use: No             10/20/2023     8:58 AM   Alcohol Use   Prescreen: >3 drinks/day or >7 drinks/week? No     Do you have a current opioid prescription? No  Do you use any other controlled substances or medications that are not prescribed by a provider? None              Current providers sharing in care for this patient include:   Patient Care Team:  Melonie Stark APRN CNP as PCP - General (Internal Medicine)  Ewelina Rodriguez MD as Assigned PCP    The following health maintenance items are reviewed in Epic and correct as of today:  Health Maintenance   Topic Date Due    DEXA  Never done    ZOSTER IMMUNIZATION (1 of 2) Never done    HEPATITIS B IMMUNIZATION (1 of 3 - Risk 3-dose series) Never done    RSV VACCINE 60+ (1 - 1-dose 60+ series) Never done    COLORECTAL CANCER SCREENING  01/01/2022    EYE EXAM  04/15/2023    BMP  09/27/2023    LIPID  09/27/2023    MICROALBUMIN  09/27/2023    ANNUAL REVIEW OF HM ORDERS  09/27/2023    DIABETIC FOOT EXAM  10/09/2023    A1C  10/14/2023    MEDICARE ANNUAL WELLNESS VISIT  09/27/2023    FALL RISK ASSESSMENT  10/20/2024    MAMMO SCREENING  09/28/2025    DTAP/TDAP/TD IMMUNIZATION (3 - Td or Tdap) 02/03/2027    ADVANCE CARE PLANNING  10/09/2027    HEPATITIS C SCREENING  Completed    PHQ-2 (once per calendar year)  Completed    INFLUENZA VACCINE  Completed    Pneumococcal Vaccine: 65+ Years  Completed    COVID-19 Vaccine  Completed    IPV IMMUNIZATION  Aged Out    HPV IMMUNIZATION  Aged Out    MENINGITIS IMMUNIZATION   "Aged Out    PAP  Discontinued               9/27/2022     9:09 AM   Breast CA Risk Assessment (FHS-7)   Do you have a family history of breast, colon, or ovarian cancer? No / Unknown           Pertinent mammograms are reviewed under the imaging tab.    Review of Systems   Constitutional:  Negative for chills and fever.   HENT:  Negative for congestion, ear pain, hearing loss and sore throat.    Eyes:  Negative for pain and visual disturbance.   Respiratory:  Negative for cough and shortness of breath.    Cardiovascular:  Negative for chest pain, palpitations and peripheral edema.   Gastrointestinal:  Positive for constipation. Negative for abdominal pain, diarrhea, heartburn, hematochezia and nausea.   Breasts:  Negative for tenderness, breast mass and discharge.   Genitourinary:  Negative for dysuria, frequency, genital sores, hematuria, pelvic pain, urgency, vaginal bleeding and vaginal discharge.   Musculoskeletal:  Negative for arthralgias, joint swelling and myalgias.   Skin:  Negative for rash.   Neurological:  Negative for dizziness, weakness, headaches and paresthesias.   Psychiatric/Behavioral:  Negative for mood changes. The patient is not nervous/anxious.          OBJECTIVE:   /75   Pulse 79   Temp 97.1  F (36.2  C) (Oral)   Resp 16   Ht 1.626 m (5' 4\")   Wt 105.7 kg (233 lb)   BMI 39.99 kg/m   Estimated body mass index is 39.99 kg/m  as calculated from the following:    Height as of this encounter: 1.626 m (5' 4\").    Weight as of this encounter: 105.7 kg (233 lb).      Physical Exam  Constitutional:       General: She is not in acute distress.     Appearance: Normal appearance. She is not ill-appearing, toxic-appearing or diaphoretic.   HENT:      Head: Normocephalic and atraumatic.   Eyes:      Conjunctiva/sclera: Conjunctivae normal.   Cardiovascular:      Rate and Rhythm: Normal rate and regular rhythm.      Heart sounds: Normal heart sounds.   Pulmonary:      Effort: Pulmonary effort is " normal.      Breath sounds: Normal breath sounds.   Skin:     General: Skin is warm and dry.   Neurological:      Mental Status: She is alert and oriented to person, place, and time.   Psychiatric:         Mood and Affect: Mood normal.         Behavior: Behavior normal.         Thought Content: Thought content normal.         Judgment: Judgment normal.     Diabetic foot exam: normal DP and PT pulses, no trophic changes or ulcerative lesions, and normal sensory exam      Diagnostic Test Results:  Labs reviewed in Epic    ASSESSMENT / PLAN:   (Z00.00) Encounter for Medicare annual wellness exam  (primary encounter diagnosis)  Comment: pt presents for annual visit  Plan:     (E11.9) Type 2 diabetes mellitus without complication, without long-term current use of insulin (H)  Comment: Fasting BG at home is 80 and postprandial readings often ~110. Last A1C was at goal at 6.7%. Will update A1C today.  Continue Metformin 1,000MG BID and Ozempic 1MG dose. She will let me know in 3-4 weeks if tolerating 1MG dose well.   Plan: Hemoglobin A1c, Albumin Random Urine         Quantitative with Creat Ratio, metFORMIN         (GLUCOPHAGE) 500 MG tablet, thin (NO BRAND         SPECIFIED) lancets, blood glucose (CONTOUR NEXT        TEST) test strip            (E66.01) Morbid obesity (H)  Comment: BMI of 39.99 with hx of HTN, T2DM, HLD. She is working on weight loss which will also help lower risk of heart disease, stroke, MI and worsening HTN, HLD, and diabetes. She believes she has lost about 15lbs since starting Ozempic.  Plan:     (E78.5) Hyperlipidemia LDL goal <100  Comment: Update LDL- Continue Lipitor 10MG  Plan: Lipid panel reflex to direct LDL Fasting,         atorvastatin (LIPITOR) 10 MG tablet            (I10) Benign essential hypertension  Comment: BP at goal at 132/75. Continue current medication regimen  Plan: Albumin Random Urine Quantitative with Creat         Ratio, Basic metabolic panel  (Ca, Cl, CO2,         Creat,  Gluc, K, Na, BUN),         lisinopril-hydrochlorothiazide (ZESTORETIC)         20-12.5 MG tablet, amLODIPine (NORVASC) 10 MG         tablet            (Z13.0) Screening for iron deficiency anemia  Comment:   Plan: CBC with platelets            (Z12.11) Screen for colon cancer  Comment:   Plan: COLOGUARD([employer])            (Z13.820,  Z78.0) Encounter for osteoporosis screening in asymptomatic postmenopausal patient  Comment:   Plan: DX Hip/Pelvis/Spine                Patient has been advised of split billing requirements and indicates understanding: Yes      COUNSELING:  Reviewed preventive health counseling, as reflected in patient instructions       Regular exercise       Healthy diet/nutrition       Osteoporosis prevention/bone health       Colon cancer screening        She reports that she has never smoked. She has never used smokeless tobacco.      Appropriate preventive services were discussed with this patient, including applicable screening as appropriate for fall prevention, nutrition, physical activity, Tobacco-use cessation, weight loss and cognition.  Checklist reviewing preventive services available has been given to the patient.    Reviewed patients plan of care and provided an AVS. The Basic Care Plan (routine screening as documented in Health Maintenance) for Rosita meets the Care Plan requirement. This Care Plan has been established and reviewed with the Patient.          CACHORRO Rayo CNP  [facility]    Identified Health Risks:  I have reviewed Opioid Use Disorder and Substance Use Disorder risk factors and made any needed referrals.

## 2023-10-20 NOTE — Clinical Note
[facility] in May of 2023, no evidence of diabetic retinopathy. Watching her for glaucoma. She is followed every 6 months

## 2023-10-20 NOTE — PATIENT INSTRUCTIONS
Send me a [employer] message in ~3 weeks after starting the 1MG dose.     Please let me know if you have any questions.    Thanks!  CACHORRO Rayo, CNP   [facility]

## 2023-11-09 DIAGNOSIS — E11.9 TYPE 2 DIABETES MELLITUS WITHOUT COMPLICATION, WITHOUT LONG-TERM CURRENT USE OF INSULIN (H): ICD-10-CM

## 2023-11-09 RX ORDER — SEMAGLUTIDE 1.34 MG/ML
1 INJECTION, SOLUTION SUBCUTANEOUS
Qty: 3 ML | Refills: 0 | Status: SHIPPED | OUTPATIENT
Start: 2023-11-09 | End: 2023-12-11

## 2023-11-10 LAB — NONINV COLON CA DNA+OCC BLD SCRN STL QL: NEGATIVE

## 2023-11-18 ENCOUNTER — APPOINTMENT (OUTPATIENT)
Dept: CT IMAGING | Facility: CLINIC | Age: 68
DRG: 330 | End: 2023-11-18
Attending: EMERGENCY MEDICINE
Payer: COMMERCIAL

## 2023-11-18 ENCOUNTER — HOSPITAL ENCOUNTER (INPATIENT)
Facility: CLINIC | Age: 68
LOS: 6 days | Discharge: HOME | DRG: 330 | End: 2023-11-25
Attending: EMERGENCY MEDICINE | Admitting: STUDENT IN AN ORGANIZED HEALTH CARE EDUCATION/TRAINING PROGRAM
Payer: COMMERCIAL

## 2023-11-18 DIAGNOSIS — N39.0 ACUTE UTI: ICD-10-CM

## 2023-11-18 DIAGNOSIS — K56.2 CECAL VOLVULUS (H): Primary | ICD-10-CM

## 2023-11-18 DIAGNOSIS — R10.84 GENERALIZED ABDOMINAL PAIN: ICD-10-CM

## 2023-11-18 DIAGNOSIS — K22.89 ESOPHAGEAL THICKENING: ICD-10-CM

## 2023-11-18 LAB
ALBUMIN SERPL BCG-MCNC: 4.1 G/DL (ref 3.5–5.2)
ALBUMIN UR-MCNC: 100 MG/DL
ALP SERPL-CCNC: 88 U/L (ref 40–150)
ALT SERPL W P-5'-P-CCNC: 20 U/L (ref 0–50)
ANION GAP SERPL CALCULATED.3IONS-SCNC: 14 MMOL/L (ref 7–15)
APPEARANCE UR: ABNORMAL
AST SERPL W P-5'-P-CCNC: 21 U/L (ref 0–45)
BACTERIA #/AREA URNS HPF: ABNORMAL /HPF
BASOPHILS # BLD AUTO: 0 10E3/UL (ref 0–0.2)
BASOPHILS NFR BLD AUTO: 0 %
BILIRUB SERPL-MCNC: 0.9 MG/DL
BILIRUB UR QL STRIP: NEGATIVE
BUN SERPL-MCNC: 19.3 MG/DL (ref 8–23)
CALCIUM SERPL-MCNC: 9.6 MG/DL (ref 8.8–10.2)
CHLORIDE SERPL-SCNC: 95 MMOL/L (ref 98–107)
COLOR UR AUTO: YELLOW
CREAT SERPL-MCNC: 0.94 MG/DL (ref 0.51–0.95)
DEPRECATED HCO3 PLAS-SCNC: 25 MMOL/L (ref 22–29)
EGFRCR SERPLBLD CKD-EPI 2021: 66 ML/MIN/1.73M2
EOSINOPHIL # BLD AUTO: 0.2 10E3/UL (ref 0–0.7)
EOSINOPHIL NFR BLD AUTO: 1 %
ERYTHROCYTE [DISTWIDTH] IN BLOOD BY AUTOMATED COUNT: 13.6 % (ref 10–15)
GLUCOSE SERPL-MCNC: 140 MG/DL (ref 70–99)
GLUCOSE UR STRIP-MCNC: NEGATIVE MG/DL
HCT VFR BLD AUTO: 38.2 % (ref 35–47)
HGB BLD-MCNC: 13.1 G/DL (ref 11.7–15.7)
HGB UR QL STRIP: NEGATIVE
HOLD SPECIMEN: NORMAL
HOLD SPECIMEN: NORMAL
IMM GRANULOCYTES # BLD: 0.1 10E3/UL
IMM GRANULOCYTES NFR BLD: 0 %
KETONES UR STRIP-MCNC: 10 MG/DL
LEUKOCYTE ESTERASE UR QL STRIP: ABNORMAL
LIPASE SERPL-CCNC: 27 U/L (ref 13–60)
LYMPHOCYTES # BLD AUTO: 2.6 10E3/UL (ref 0.8–5.3)
LYMPHOCYTES NFR BLD AUTO: 14 %
MCH RBC QN AUTO: 28.9 PG (ref 26.5–33)
MCHC RBC AUTO-ENTMCNC: 34.3 G/DL (ref 31.5–36.5)
MCV RBC AUTO: 84 FL (ref 78–100)
MONOCYTES # BLD AUTO: 1.3 10E3/UL (ref 0–1.3)
MONOCYTES NFR BLD AUTO: 7 %
MUCOUS THREADS #/AREA URNS LPF: PRESENT /LPF
NEUTROPHILS # BLD AUTO: 13.8 10E3/UL (ref 1.6–8.3)
NEUTROPHILS NFR BLD AUTO: 78 %
NITRATE UR QL: NEGATIVE
NRBC # BLD AUTO: 0 10E3/UL
NRBC BLD AUTO-RTO: 0 /100
PH UR STRIP: 7 [PH] (ref 5–7)
PLATELET # BLD AUTO: 426 10E3/UL (ref 150–450)
POTASSIUM SERPL-SCNC: 4 MMOL/L (ref 3.4–5.3)
PROT SERPL-MCNC: 7.4 G/DL (ref 6.4–8.3)
RBC # BLD AUTO: 4.54 10E6/UL (ref 3.8–5.2)
RBC URINE: 5 /HPF
SODIUM SERPL-SCNC: 134 MMOL/L (ref 135–145)
SP GR UR STRIP: 1.03 (ref 1–1.03)
SQUAMOUS EPITHELIAL: 28 /HPF
UROBILINOGEN UR STRIP-MCNC: NORMAL MG/DL
WBC # BLD AUTO: 18 10E3/UL (ref 4–11)
WBC URINE: 59 /HPF

## 2023-11-18 PROCEDURE — 81001 URINALYSIS AUTO W/SCOPE: CPT | Performed by: EMERGENCY MEDICINE

## 2023-11-18 PROCEDURE — 85004 AUTOMATED DIFF WBC COUNT: CPT | Performed by: EMERGENCY MEDICINE

## 2023-11-18 PROCEDURE — 258N000003 HC RX IP 258 OP 636: Performed by: EMERGENCY MEDICINE

## 2023-11-18 PROCEDURE — 250N000011 HC RX IP 250 OP 636: Mod: JZ | Performed by: EMERGENCY MEDICINE

## 2023-11-18 PROCEDURE — 80053 COMPREHEN METABOLIC PANEL: CPT | Performed by: EMERGENCY MEDICINE

## 2023-11-18 PROCEDURE — 74177 CT ABD & PELVIS W/CONTRAST: CPT

## 2023-11-18 PROCEDURE — 96365 THER/PROPH/DIAG IV INF INIT: CPT | Mod: 59

## 2023-11-18 PROCEDURE — 36415 COLL VENOUS BLD VENIPUNCTURE: CPT | Performed by: EMERGENCY MEDICINE

## 2023-11-18 PROCEDURE — 87086 URINE CULTURE/COLONY COUNT: CPT | Performed by: EMERGENCY MEDICINE

## 2023-11-18 PROCEDURE — 96361 HYDRATE IV INFUSION ADD-ON: CPT

## 2023-11-18 PROCEDURE — 99285 EMERGENCY DEPT VISIT HI MDM: CPT | Mod: 25

## 2023-11-18 PROCEDURE — 83690 ASSAY OF LIPASE: CPT | Performed by: EMERGENCY MEDICINE

## 2023-11-18 PROCEDURE — 250N000011 HC RX IP 250 OP 636: Performed by: EMERGENCY MEDICINE

## 2023-11-18 RX ORDER — CEFTRIAXONE 1 G/1
1 INJECTION, POWDER, FOR SOLUTION INTRAMUSCULAR; INTRAVENOUS ONCE
Status: COMPLETED | OUTPATIENT
Start: 2023-11-18 | End: 2023-11-19

## 2023-11-18 RX ORDER — IOPAMIDOL 755 MG/ML
500 INJECTION, SOLUTION INTRAVASCULAR ONCE
Status: COMPLETED | OUTPATIENT
Start: 2023-11-18 | End: 2023-11-18

## 2023-11-18 RX ADMIN — IOPAMIDOL 100 ML: 755 INJECTION, SOLUTION INTRAVENOUS at 23:20

## 2023-11-18 RX ADMIN — CEFTRIAXONE 1 G: 1 INJECTION, POWDER, FOR SOLUTION INTRAMUSCULAR; INTRAVENOUS at 23:31

## 2023-11-18 RX ADMIN — SODIUM CHLORIDE 1000 ML: 9 INJECTION, SOLUTION INTRAVENOUS at 20:49

## 2023-11-18 ASSESSMENT — ACTIVITIES OF DAILY LIVING (ADL)
ADLS_ACUITY_SCORE: 35
ADLS_ACUITY_SCORE: 35

## 2023-11-19 ENCOUNTER — ANESTHESIA EVENT (OUTPATIENT)
Dept: SURGERY | Facility: CLINIC | Age: 68
DRG: 330 | End: 2023-11-19
Payer: COMMERCIAL

## 2023-11-19 ENCOUNTER — ANESTHESIA (OUTPATIENT)
Dept: SURGERY | Facility: CLINIC | Age: 68
DRG: 330 | End: 2023-11-19
Payer: COMMERCIAL

## 2023-11-19 PROBLEM — R10.84 GENERALIZED ABDOMINAL PAIN: Status: ACTIVE | Noted: 2023-11-19

## 2023-11-19 PROBLEM — K56.2 CECAL VOLVULUS (H): Status: ACTIVE | Noted: 2023-11-19

## 2023-11-19 PROBLEM — N39.0 ACUTE UTI: Status: ACTIVE | Noted: 2023-11-19

## 2023-11-19 LAB
ANION GAP SERPL CALCULATED.3IONS-SCNC: 11 MMOL/L (ref 7–15)
BUN SERPL-MCNC: 17.6 MG/DL (ref 8–23)
CALCIUM SERPL-MCNC: 8.7 MG/DL (ref 8.8–10.2)
CHLORIDE SERPL-SCNC: 100 MMOL/L (ref 98–107)
CREAT SERPL-MCNC: 0.72 MG/DL (ref 0.51–0.95)
DEPRECATED HCO3 PLAS-SCNC: 25 MMOL/L (ref 22–29)
EGFRCR SERPLBLD CKD-EPI 2021: >90 ML/MIN/1.73M2
ERYTHROCYTE [DISTWIDTH] IN BLOOD BY AUTOMATED COUNT: 13.4 % (ref 10–15)
GLUCOSE BLDC GLUCOMTR-MCNC: 125 MG/DL (ref 70–99)
GLUCOSE BLDC GLUCOMTR-MCNC: 132 MG/DL (ref 70–99)
GLUCOSE BLDC GLUCOMTR-MCNC: 143 MG/DL (ref 70–99)
GLUCOSE BLDC GLUCOMTR-MCNC: 154 MG/DL (ref 70–99)
GLUCOSE BLDC GLUCOMTR-MCNC: 163 MG/DL (ref 70–99)
GLUCOSE BLDC GLUCOMTR-MCNC: 169 MG/DL (ref 70–99)
GLUCOSE BLDC GLUCOMTR-MCNC: 183 MG/DL (ref 70–99)
GLUCOSE SERPL-MCNC: 164 MG/DL (ref 70–99)
HCT VFR BLD AUTO: 38.6 % (ref 35–47)
HGB BLD-MCNC: 12.9 G/DL (ref 11.7–15.7)
LACTATE SERPL-SCNC: 1.1 MMOL/L (ref 0.7–2)
MCH RBC QN AUTO: 28.5 PG (ref 26.5–33)
MCHC RBC AUTO-ENTMCNC: 33.4 G/DL (ref 31.5–36.5)
MCV RBC AUTO: 85 FL (ref 78–100)
PLATELET # BLD AUTO: 399 10E3/UL (ref 150–450)
POTASSIUM SERPL-SCNC: 3.9 MMOL/L (ref 3.4–5.3)
RBC # BLD AUTO: 4.53 10E6/UL (ref 3.8–5.2)
SODIUM SERPL-SCNC: 136 MMOL/L (ref 135–145)
WBC # BLD AUTO: 16.4 10E3/UL (ref 4–11)

## 2023-11-19 PROCEDURE — 120N000001 HC R&B MED SURG/OB

## 2023-11-19 PROCEDURE — 99222 1ST HOSP IP/OBS MODERATE 55: CPT | Performed by: STUDENT IN AN ORGANIZED HEALTH CARE EDUCATION/TRAINING PROGRAM

## 2023-11-19 PROCEDURE — 250N000011 HC RX IP 250 OP 636: Performed by: NURSE ANESTHETIST, CERTIFIED REGISTERED

## 2023-11-19 PROCEDURE — 250N000013 HC RX MED GY IP 250 OP 250 PS 637: Performed by: INTERNAL MEDICINE

## 2023-11-19 PROCEDURE — 250N000025 HC SEVOFLURANE, PER MIN: Performed by: COLON & RECTAL SURGERY

## 2023-11-19 PROCEDURE — 88307 TISSUE EXAM BY PATHOLOGIST: CPT | Mod: TC | Performed by: COLON & RECTAL SURGERY

## 2023-11-19 PROCEDURE — 258N000001 HC RX 258: Performed by: COLON & RECTAL SURGERY

## 2023-11-19 PROCEDURE — 250N000012 HC RX MED GY IP 250 OP 636 PS 637: Performed by: STUDENT IN AN ORGANIZED HEALTH CARE EDUCATION/TRAINING PROGRAM

## 2023-11-19 PROCEDURE — 258N000003 HC RX IP 258 OP 636: Performed by: COLON & RECTAL SURGERY

## 2023-11-19 PROCEDURE — 370N000017 HC ANESTHESIA TECHNICAL FEE, PER MIN: Performed by: COLON & RECTAL SURGERY

## 2023-11-19 PROCEDURE — 85027 COMPLETE CBC AUTOMATED: CPT | Performed by: STUDENT IN AN ORGANIZED HEALTH CARE EDUCATION/TRAINING PROGRAM

## 2023-11-19 PROCEDURE — 360N000077 HC SURGERY LEVEL 4, PER MIN: Performed by: COLON & RECTAL SURGERY

## 2023-11-19 PROCEDURE — 999N000141 HC STATISTIC PRE-PROCEDURE NURSING ASSESSMENT: Performed by: COLON & RECTAL SURGERY

## 2023-11-19 PROCEDURE — 250N000011 HC RX IP 250 OP 636: Performed by: COLON & RECTAL SURGERY

## 2023-11-19 PROCEDURE — 250N000009 HC RX 250: Performed by: INTERNAL MEDICINE

## 2023-11-19 PROCEDURE — 250N000011 HC RX IP 250 OP 636: Mod: JZ | Performed by: COLON & RECTAL SURGERY

## 2023-11-19 PROCEDURE — 250N000009 HC RX 250: Performed by: NURSE ANESTHETIST, CERTIFIED REGISTERED

## 2023-11-19 PROCEDURE — 272N000001 HC OR GENERAL SUPPLY STERILE: Performed by: COLON & RECTAL SURGERY

## 2023-11-19 PROCEDURE — 36415 COLL VENOUS BLD VENIPUNCTURE: CPT | Performed by: INTERNAL MEDICINE

## 2023-11-19 PROCEDURE — 36415 COLL VENOUS BLD VENIPUNCTURE: CPT | Performed by: STUDENT IN AN ORGANIZED HEALTH CARE EDUCATION/TRAINING PROGRAM

## 2023-11-19 PROCEDURE — 99207 PR NO BILLABLE SERVICE THIS VISIT: CPT | Performed by: INTERNAL MEDICINE

## 2023-11-19 PROCEDURE — 250N000011 HC RX IP 250 OP 636: Performed by: ANESTHESIOLOGY

## 2023-11-19 PROCEDURE — 258N000003 HC RX IP 258 OP 636: Performed by: NURSE ANESTHETIST, CERTIFIED REGISTERED

## 2023-11-19 PROCEDURE — 82310 ASSAY OF CALCIUM: CPT | Performed by: STUDENT IN AN ORGANIZED HEALTH CARE EDUCATION/TRAINING PROGRAM

## 2023-11-19 PROCEDURE — 83605 ASSAY OF LACTIC ACID: CPT | Performed by: INTERNAL MEDICINE

## 2023-11-19 PROCEDURE — 0DTF0ZZ RESECTION OF RIGHT LARGE INTESTINE, OPEN APPROACH: ICD-10-PCS | Performed by: COLON & RECTAL SURGERY

## 2023-11-19 PROCEDURE — 710N000009 HC RECOVERY PHASE 1, LEVEL 1, PER MIN: Performed by: COLON & RECTAL SURGERY

## 2023-11-19 RX ORDER — LABETALOL HYDROCHLORIDE 5 MG/ML
10 INJECTION, SOLUTION INTRAVENOUS EVERY 5 MIN PRN
Status: DISCONTINUED | OUTPATIENT
Start: 2023-11-19 | End: 2023-11-19 | Stop reason: HOSPADM

## 2023-11-19 RX ORDER — LIDOCAINE 40 MG/G
CREAM TOPICAL
Status: DISCONTINUED | OUTPATIENT
Start: 2023-11-19 | End: 2023-11-19 | Stop reason: HOSPADM

## 2023-11-19 RX ORDER — ACETAMINOPHEN 325 MG/1
975 TABLET ORAL ONCE
Status: COMPLETED | OUTPATIENT
Start: 2023-11-19 | End: 2023-11-19

## 2023-11-19 RX ORDER — ONDANSETRON 2 MG/ML
INJECTION INTRAMUSCULAR; INTRAVENOUS PRN
Status: DISCONTINUED | OUTPATIENT
Start: 2023-11-19 | End: 2023-11-19

## 2023-11-19 RX ORDER — ONDANSETRON 4 MG/1
4 TABLET, ORALLY DISINTEGRATING ORAL EVERY 6 HOURS PRN
Status: DISCONTINUED | OUTPATIENT
Start: 2023-11-19 | End: 2023-11-25 | Stop reason: HOSPADM

## 2023-11-19 RX ORDER — KETOROLAC TROMETHAMINE 15 MG/ML
15 INJECTION, SOLUTION INTRAMUSCULAR; INTRAVENOUS EVERY 6 HOURS PRN
Status: ACTIVE | OUTPATIENT
Start: 2023-11-19 | End: 2023-11-23

## 2023-11-19 RX ORDER — TIMOLOL MALEATE 5 MG/ML
1 SOLUTION/ DROPS OPHTHALMIC 2 TIMES DAILY
Status: DISCONTINUED | OUTPATIENT
Start: 2023-11-19 | End: 2023-11-25 | Stop reason: HOSPADM

## 2023-11-19 RX ORDER — SODIUM CHLORIDE, SODIUM LACTATE, POTASSIUM CHLORIDE, CALCIUM CHLORIDE 600; 310; 30; 20 MG/100ML; MG/100ML; MG/100ML; MG/100ML
INJECTION, SOLUTION INTRAVENOUS CONTINUOUS PRN
Status: DISCONTINUED | OUTPATIENT
Start: 2023-11-19 | End: 2023-11-19

## 2023-11-19 RX ORDER — FENTANYL CITRATE 50 UG/ML
50 INJECTION, SOLUTION INTRAMUSCULAR; INTRAVENOUS EVERY 5 MIN PRN
Status: DISCONTINUED | OUTPATIENT
Start: 2023-11-19 | End: 2023-11-19 | Stop reason: HOSPADM

## 2023-11-19 RX ORDER — CALCIUM CARBONATE 500 MG/1
1000 TABLET, CHEWABLE ORAL 4 TIMES DAILY PRN
Status: DISCONTINUED | OUTPATIENT
Start: 2023-11-19 | End: 2023-11-25 | Stop reason: HOSPADM

## 2023-11-19 RX ORDER — NALOXONE HYDROCHLORIDE 0.4 MG/ML
0.4 INJECTION, SOLUTION INTRAMUSCULAR; INTRAVENOUS; SUBCUTANEOUS
Status: DISCONTINUED | OUTPATIENT
Start: 2023-11-19 | End: 2023-11-25 | Stop reason: HOSPADM

## 2023-11-19 RX ORDER — LIDOCAINE 40 MG/G
CREAM TOPICAL
Status: DISCONTINUED | OUTPATIENT
Start: 2023-11-19 | End: 2023-11-25 | Stop reason: HOSPADM

## 2023-11-19 RX ORDER — DIPHENHYDRAMINE HYDROCHLORIDE 50 MG/ML
12.5 INJECTION INTRAMUSCULAR; INTRAVENOUS EVERY 6 HOURS PRN
Status: DISCONTINUED | OUTPATIENT
Start: 2023-11-19 | End: 2023-11-25 | Stop reason: HOSPADM

## 2023-11-19 RX ORDER — HYDRALAZINE HYDROCHLORIDE 20 MG/ML
10 INJECTION INTRAMUSCULAR; INTRAVENOUS EVERY 4 HOURS PRN
Status: DISCONTINUED | OUTPATIENT
Start: 2023-11-19 | End: 2023-11-25 | Stop reason: HOSPADM

## 2023-11-19 RX ORDER — METRONIDAZOLE 500 MG/100ML
500 INJECTION, SOLUTION INTRAVENOUS
Status: COMPLETED | OUTPATIENT
Start: 2023-11-19 | End: 2023-11-19

## 2023-11-19 RX ORDER — FENTANYL CITRATE 50 UG/ML
25 INJECTION, SOLUTION INTRAMUSCULAR; INTRAVENOUS EVERY 5 MIN PRN
Status: DISCONTINUED | OUTPATIENT
Start: 2023-11-19 | End: 2023-11-19 | Stop reason: HOSPADM

## 2023-11-19 RX ORDER — DIPHENHYDRAMINE HCL 12.5MG/5ML
12.5 LIQUID (ML) ORAL EVERY 6 HOURS PRN
Status: DISCONTINUED | OUTPATIENT
Start: 2023-11-19 | End: 2023-11-25 | Stop reason: HOSPADM

## 2023-11-19 RX ORDER — ONDANSETRON 2 MG/ML
4 INJECTION INTRAMUSCULAR; INTRAVENOUS EVERY 30 MIN PRN
Status: DISCONTINUED | OUTPATIENT
Start: 2023-11-19 | End: 2023-11-19 | Stop reason: HOSPADM

## 2023-11-19 RX ORDER — DEXAMETHASONE SODIUM PHOSPHATE 4 MG/ML
INJECTION, SOLUTION INTRA-ARTICULAR; INTRALESIONAL; INTRAMUSCULAR; INTRAVENOUS; SOFT TISSUE PRN
Status: DISCONTINUED | OUTPATIENT
Start: 2023-11-19 | End: 2023-11-19

## 2023-11-19 RX ORDER — AMOXICILLIN 250 MG
1 CAPSULE ORAL 2 TIMES DAILY PRN
Status: DISCONTINUED | OUTPATIENT
Start: 2023-11-19 | End: 2023-11-25 | Stop reason: HOSPADM

## 2023-11-19 RX ORDER — SODIUM CHLORIDE, SODIUM LACTATE, POTASSIUM CHLORIDE, CALCIUM CHLORIDE 600; 310; 30; 20 MG/100ML; MG/100ML; MG/100ML; MG/100ML
INJECTION, SOLUTION INTRAVENOUS CONTINUOUS
Status: DISCONTINUED | OUTPATIENT
Start: 2023-11-19 | End: 2023-11-19 | Stop reason: HOSPADM

## 2023-11-19 RX ORDER — LORAZEPAM 2 MG/ML
0.5 INJECTION INTRAMUSCULAR EVERY 4 HOURS PRN
Status: DISCONTINUED | OUTPATIENT
Start: 2023-11-19 | End: 2023-11-25 | Stop reason: HOSPADM

## 2023-11-19 RX ORDER — CEFAZOLIN SODIUM/WATER 2 G/20 ML
2 SYRINGE (ML) INTRAVENOUS
Status: COMPLETED | OUTPATIENT
Start: 2023-11-19 | End: 2023-11-19

## 2023-11-19 RX ORDER — METHADONE HYDROCHLORIDE 10 MG/ML
2 INJECTION, SOLUTION INTRAMUSCULAR; INTRAVENOUS; SUBCUTANEOUS ONCE
Status: COMPLETED | OUTPATIENT
Start: 2023-11-19 | End: 2023-11-19

## 2023-11-19 RX ORDER — LATANOPROST 50 UG/ML
1 SOLUTION/ DROPS OPHTHALMIC AT BEDTIME
Status: DISCONTINUED | OUTPATIENT
Start: 2023-11-19 | End: 2023-11-25 | Stop reason: HOSPADM

## 2023-11-19 RX ORDER — ONDANSETRON 2 MG/ML
4 INJECTION INTRAMUSCULAR; INTRAVENOUS EVERY 6 HOURS PRN
Status: DISCONTINUED | OUTPATIENT
Start: 2023-11-19 | End: 2023-11-25 | Stop reason: HOSPADM

## 2023-11-19 RX ORDER — PROPOFOL 10 MG/ML
INJECTION, EMULSION INTRAVENOUS PRN
Status: DISCONTINUED | OUTPATIENT
Start: 2023-11-19 | End: 2023-11-19

## 2023-11-19 RX ORDER — METHADONE HYDROCHLORIDE 10 MG/ML
INJECTION, SOLUTION INTRAMUSCULAR; INTRAVENOUS; SUBCUTANEOUS PRN
Status: DISCONTINUED | OUTPATIENT
Start: 2023-11-19 | End: 2023-11-19

## 2023-11-19 RX ORDER — HYDROMORPHONE HCL IN WATER/PF 6 MG/30 ML
0.2 PATIENT CONTROLLED ANALGESIA SYRINGE INTRAVENOUS EVERY 5 MIN PRN
Status: DISCONTINUED | OUTPATIENT
Start: 2023-11-19 | End: 2023-11-19 | Stop reason: HOSPADM

## 2023-11-19 RX ORDER — KETOROLAC TROMETHAMINE 15 MG/ML
15 INJECTION, SOLUTION INTRAMUSCULAR; INTRAVENOUS
Status: COMPLETED | OUTPATIENT
Start: 2023-11-19 | End: 2023-11-19

## 2023-11-19 RX ORDER — AMOXICILLIN 250 MG
2 CAPSULE ORAL 2 TIMES DAILY PRN
Status: DISCONTINUED | OUTPATIENT
Start: 2023-11-19 | End: 2023-11-25 | Stop reason: HOSPADM

## 2023-11-19 RX ORDER — SODIUM CHLORIDE, SODIUM LACTATE, POTASSIUM CHLORIDE, CALCIUM CHLORIDE 600; 310; 30; 20 MG/100ML; MG/100ML; MG/100ML; MG/100ML
INJECTION, SOLUTION INTRAVENOUS CONTINUOUS
Status: DISCONTINUED | OUTPATIENT
Start: 2023-11-19 | End: 2023-11-24

## 2023-11-19 RX ORDER — NALOXONE HYDROCHLORIDE 0.4 MG/ML
0.2 INJECTION, SOLUTION INTRAMUSCULAR; INTRAVENOUS; SUBCUTANEOUS
Status: DISCONTINUED | OUTPATIENT
Start: 2023-11-19 | End: 2023-11-25 | Stop reason: HOSPADM

## 2023-11-19 RX ORDER — DEXTROSE MONOHYDRATE 25 G/50ML
25-50 INJECTION, SOLUTION INTRAVENOUS
Status: DISCONTINUED | OUTPATIENT
Start: 2023-11-19 | End: 2023-11-25 | Stop reason: HOSPADM

## 2023-11-19 RX ORDER — HYDROMORPHONE HCL IN WATER/PF 6 MG/30 ML
0.4 PATIENT CONTROLLED ANALGESIA SYRINGE INTRAVENOUS EVERY 5 MIN PRN
Status: DISCONTINUED | OUTPATIENT
Start: 2023-11-19 | End: 2023-11-19 | Stop reason: HOSPADM

## 2023-11-19 RX ORDER — ONDANSETRON 4 MG/1
4 TABLET, ORALLY DISINTEGRATING ORAL EVERY 30 MIN PRN
Status: DISCONTINUED | OUTPATIENT
Start: 2023-11-19 | End: 2023-11-19

## 2023-11-19 RX ORDER — HEPARIN SODIUM 5000 [USP'U]/.5ML
5000 INJECTION, SOLUTION INTRAVENOUS; SUBCUTANEOUS EVERY 8 HOURS
Status: DISCONTINUED | OUTPATIENT
Start: 2023-11-20 | End: 2023-11-25 | Stop reason: HOSPADM

## 2023-11-19 RX ORDER — ONDANSETRON 2 MG/ML
4 INJECTION INTRAMUSCULAR; INTRAVENOUS EVERY 30 MIN PRN
Status: DISCONTINUED | OUTPATIENT
Start: 2023-11-19 | End: 2023-11-19

## 2023-11-19 RX ORDER — OXYCODONE HYDROCHLORIDE 5 MG/1
10 TABLET ORAL
Status: DISCONTINUED | OUTPATIENT
Start: 2023-11-19 | End: 2023-11-19

## 2023-11-19 RX ORDER — CEFAZOLIN SODIUM/WATER 2 G/20 ML
2 SYRINGE (ML) INTRAVENOUS SEE ADMIN INSTRUCTIONS
Status: DISCONTINUED | OUTPATIENT
Start: 2023-11-19 | End: 2023-11-19 | Stop reason: HOSPADM

## 2023-11-19 RX ORDER — NICOTINE POLACRILEX 4 MG
15-30 LOZENGE BUCCAL
Status: DISCONTINUED | OUTPATIENT
Start: 2023-11-19 | End: 2023-11-25 | Stop reason: HOSPADM

## 2023-11-19 RX ORDER — ONDANSETRON 4 MG/1
4 TABLET, ORALLY DISINTEGRATING ORAL EVERY 30 MIN PRN
Status: DISCONTINUED | OUTPATIENT
Start: 2023-11-19 | End: 2023-11-19 | Stop reason: HOSPADM

## 2023-11-19 RX ORDER — OXYCODONE HYDROCHLORIDE 5 MG/1
5 TABLET ORAL
Status: DISCONTINUED | OUTPATIENT
Start: 2023-11-19 | End: 2023-11-19

## 2023-11-19 RX ADMIN — SODIUM CHLORIDE, POTASSIUM CHLORIDE, SODIUM LACTATE AND CALCIUM CHLORIDE: 600; 310; 30; 20 INJECTION, SOLUTION INTRAVENOUS at 04:55

## 2023-11-19 RX ADMIN — HEPARIN SODIUM 5000 UNITS: 5000 INJECTION, SOLUTION INTRAVENOUS; SUBCUTANEOUS at 23:54

## 2023-11-19 RX ADMIN — FENTANYL CITRATE 50 MCG: 50 INJECTION INTRAMUSCULAR; INTRAVENOUS at 03:12

## 2023-11-19 RX ADMIN — PHENYLEPHRINE HYDROCHLORIDE 200 MCG: 10 INJECTION INTRAVENOUS at 01:25

## 2023-11-19 RX ADMIN — SODIUM CHLORIDE, POTASSIUM CHLORIDE, SODIUM LACTATE AND CALCIUM CHLORIDE: 600; 310; 30; 20 INJECTION, SOLUTION INTRAVENOUS at 11:34

## 2023-11-19 RX ADMIN — NOREPINEPHRINE BITARTRATE 6.4 MCG: 1 INJECTION, SOLUTION, CONCENTRATE INTRAVENOUS at 01:10

## 2023-11-19 RX ADMIN — LATANOPROST 1 DROP: 50 SOLUTION OPHTHALMIC at 21:26

## 2023-11-19 RX ADMIN — NOREPINEPHRINE BITARTRATE 12.8 MCG: 1 INJECTION, SOLUTION, CONCENTRATE INTRAVENOUS at 01:16

## 2023-11-19 RX ADMIN — METRONIDAZOLE 500 MG: 500 INJECTION, SOLUTION INTRAVENOUS at 00:59

## 2023-11-19 RX ADMIN — TIMOLOL MALEATE 1 DROP: 5 SOLUTION/ DROPS OPHTHALMIC at 11:07

## 2023-11-19 RX ADMIN — PROPOFOL 150 MG: 10 INJECTION, EMULSION INTRAVENOUS at 01:06

## 2023-11-19 RX ADMIN — ONDANSETRON 4 MG: 2 INJECTION INTRAMUSCULAR; INTRAVENOUS at 01:19

## 2023-11-19 RX ADMIN — FENTANYL CITRATE 50 MCG: 50 INJECTION INTRAMUSCULAR; INTRAVENOUS at 03:27

## 2023-11-19 RX ADMIN — ROCURONIUM BROMIDE 50 MG: 50 INJECTION, SOLUTION INTRAVENOUS at 01:38

## 2023-11-19 RX ADMIN — DEXAMETHASONE SODIUM PHOSPHATE 8 MG: 4 INJECTION, SOLUTION INTRA-ARTICULAR; INTRALESIONAL; INTRAMUSCULAR; INTRAVENOUS; SOFT TISSUE at 01:13

## 2023-11-19 RX ADMIN — SUGAMMADEX 400 MG: 100 INJECTION, SOLUTION INTRAVENOUS at 02:36

## 2023-11-19 RX ADMIN — INSULIN ASPART 1 UNITS: 100 INJECTION, SOLUTION INTRAVENOUS; SUBCUTANEOUS at 11:02

## 2023-11-19 RX ADMIN — Medication: at 07:56

## 2023-11-19 RX ADMIN — INSULIN ASPART 1 UNITS: 100 INJECTION, SOLUTION INTRAVENOUS; SUBCUTANEOUS at 14:59

## 2023-11-19 RX ADMIN — Medication 10 MG: at 01:13

## 2023-11-19 RX ADMIN — ROCURONIUM BROMIDE 30 MG: 50 INJECTION, SOLUTION INTRAVENOUS at 01:10

## 2023-11-19 RX ADMIN — Medication 2 G: at 00:57

## 2023-11-19 RX ADMIN — SODIUM CHLORIDE, POTASSIUM CHLORIDE, SODIUM LACTATE AND CALCIUM CHLORIDE: 600; 310; 30; 20 INJECTION, SOLUTION INTRAVENOUS at 01:01

## 2023-11-19 RX ADMIN — KETOROLAC TROMETHAMINE 15 MG: 15 INJECTION INTRAMUSCULAR; INTRAVENOUS at 03:59

## 2023-11-19 RX ADMIN — FENTANYL CITRATE 50 MCG: 50 INJECTION INTRAMUSCULAR; INTRAVENOUS at 03:36

## 2023-11-19 RX ADMIN — PHENYLEPHRINE HYDROCHLORIDE 200 MCG: 10 INJECTION INTRAVENOUS at 01:19

## 2023-11-19 RX ADMIN — FENTANYL CITRATE 50 MCG: 50 INJECTION INTRAMUSCULAR; INTRAVENOUS at 03:45

## 2023-11-19 RX ADMIN — SODIUM CHLORIDE, POTASSIUM CHLORIDE, SODIUM LACTATE AND CALCIUM CHLORIDE: 600; 310; 30; 20 INJECTION, SOLUTION INTRAVENOUS at 01:32

## 2023-11-19 RX ADMIN — MIDAZOLAM 1 MG: 1 INJECTION INTRAMUSCULAR; INTRAVENOUS at 00:57

## 2023-11-19 RX ADMIN — ROCURONIUM BROMIDE 20 MG: 50 INJECTION, SOLUTION INTRAVENOUS at 01:27

## 2023-11-19 RX ADMIN — METHADONE HYDROCHLORIDE 2 MG: 10 INJECTION, SOLUTION INTRAMUSCULAR; INTRAVENOUS; SUBCUTANEOUS at 03:57

## 2023-11-19 RX ADMIN — Medication 100 MG: at 01:06

## 2023-11-19 RX ADMIN — SODIUM CHLORIDE, POTASSIUM CHLORIDE, SODIUM LACTATE AND CALCIUM CHLORIDE: 600; 310; 30; 20 INJECTION, SOLUTION INTRAVENOUS at 02:22

## 2023-11-19 ASSESSMENT — ACTIVITIES OF DAILY LIVING (ADL)
ADLS_ACUITY_SCORE: 35
ADLS_ACUITY_SCORE: 39
ADLS_ACUITY_SCORE: 39
ADLS_ACUITY_SCORE: 35
ADLS_ACUITY_SCORE: 39
ADLS_ACUITY_SCORE: 35
ADLS_ACUITY_SCORE: 37
ADLS_ACUITY_SCORE: 39
ADLS_ACUITY_SCORE: 37
ADLS_ACUITY_SCORE: 39
ADLS_ACUITY_SCORE: 39
ADLS_ACUITY_SCORE: 35

## 2023-11-19 NOTE — H&P
"[facility]    History and Physical - Hospitalist Service       Date of Admission:  11/18/2023    Assessment & Plan      Rosita Ramirez is a 68 year old female with PMHx of T2DM, HTN, HLD, who was admitted on 11/18/2023 with cecal volvulus. CRC surgery consulted for operative management.    Cecal volvulus with cecal wall pneumatosis  Small bowel obstruction 2/2 above  Leukocytosis  Presenting with 3 days of abdominal pain. Last BM Thursday 11/16. N/V developed on day of presentation. Hx of appendectomy as a child. Labs notable for WBC of 18, normal lipase, LFTs, and BMP. CT showed cecal volvulus with pneumatosis in wall of cecum, as well as obstruction with dilated distal small bowel.   NG tube placed in ER. CRC taking patient to OR for emergent ex lap, R colectomy, and possible ileostomy.     Circumferential wall thickening of distal esophagus  Noted on CT. Likely inflammatory 2/2 N/V related to above. Differential includes neoplastic. Outpatient follow up.     Asymptomatic bacteriuria   Abnormal UA on admission with large leukocyte esterase and negative nitrite. Patient denies urinary symptoms.     Type 2 diabetes mellitus  On metformin and semaglutide PTA.  on arrival.   - LDSSI    HTN  HLD  Glaucoma - has metal ocular implants that are not MRI compatible     Will order PTA medications as appropriate once medication reconciliation is complete.         Diet: NPO per Anesthesia Guidelines for Procedure/Surgery Except for: Meds  DVT Prophylaxis: Pneumatic Compression Devices  Faulkner Catheter: Not present  Lines: None     Cardiac Monitoring: None  Code Status:  Full    Clinically Significant Risk Factors Present on Admission                  # Hypertension: Noted on problem list      # Obesity: Estimated body mass index is 39.99 kg/m  as calculated from the following:    Height as of 10/20/23: 1.626 m (5' 4\").    Weight as of 10/20/23: 105.7 kg (233 lb).              Disposition Plan "      Expected Discharge Date: 2023                  Echo Friedman DO  Hospitalist Service  [facility]  Securely message with inContact (more info)  Text page via [facility] Paging/Directory     ______________________________________________________________________    Chief Complaint   Abdominal pain    History is obtained from the patient    History of Present Illness   Rosita Ramirez is a 68 year old female who presents with constant abdominal pain since Thursday. Developed nausea/vomiting today. Last bowel movement was Thursday and was normal. No fever/chills, chest pain, dyspnea, dysuria, hematuria, hematochezia, melena.     ER course: received CTX and 1L NS. NG tube placed.       Past Medical History    Past Medical History:   Diagnosis Date    Hyperlipidemia     Hypertension        Past Surgical History   Past Surgical History:   Procedure Laterality Date    APPENDECTOMY      EXTRACTION(S) DENTAL         Prior to Admission Medications   Prior to Admission Medications   Prescriptions Last Dose Informant Patient Reported? Taking?   Semaglutide, 1 MG/DOSE, (OZEMPIC, 1 MG/DOSE,) 4 MG/3ML pen   No No   Sig: INJECT 1 MG SUBCUTANEOUS EVERY 7 DAYS   Semaglutide, 2 MG/DOSE, (OZEMPIC) 8 MG/3ML pen Past Month  No Yes   Sig: Inject 2 mg Subcutaneous every 7 days   amLODIPine (NORVASC) 10 MG tablet 2023  No No   Sig: Take 1 tablet (10 mg) by mouth daily   atorvastatin (LIPITOR) 10 MG tablet 2023  No No   Sig: Take 1 tablet (10 mg) by mouth daily   blood glucose (CONTOUR NEXT TEST) test strip   No No   Si strip by In Vitro route daily   latanoprost (XALATAN) 0.005 % ophthalmic solution 11/15/2023  Yes No   Si drop At Bedtime   lisinopril-hydrochlorothiazide (ZESTORETIC) 20-12.5 MG tablet 2023  No No   Sig: Take 1 tablet by mouth daily   metFORMIN (GLUCOPHAGE) 500 MG tablet More than a month  No Yes   Sig: Take 2 tablets (1,000 mg) by mouth 2 times daily (with meals)    thin (NO BRAND SPECIFIED) lancets   No No   Sig: Use with lanceting device once daily   timolol hemihydrate (BETIMOL) 0.5 % SOLN ophthalmic solution 2023  Yes Yes   Si drop 2 times daily      Facility-Administered Medications: None           Physical Exam   Vital Signs: Temp: 97.2  F (36.2  C) Temp src: Temporal BP: (!) 142/80 Pulse: 106   Resp: 18 SpO2: 92 % O2 Device: None (Room air)    Weight: 0 lbs 0 oz    Constitutional: Awake, alert, no distress, and cooperative  Cardiovascular: Regular rate and rhythm, normal S1 and S2, no S3 or S4, and no murmur noted  Respiratory: No increased work of breathing, good air exchange, clear to auscultation bilaterally, no crackles or wheezing  Gastrointestinal: Abdomen soft, non-distended. BS normal.    Medical Decision Making       60 MINUTES SPENT BY ME on the date of service doing chart review, history, exam, documentation & further activities per the note.      Data     I have personally reviewed the following data over the past 24 hrs:    18.0 (H)  \   13.1   / 426     134 (L) 95 (L) 19.3 /  143 (H)   4.0 25 0.94 \     ALT: 20 AST: 21 AP: 88 TBILI: 0.9   ALB: 4.1 TOT PROTEIN: 7.4 LIPASE: 27

## 2023-11-19 NOTE — PLAN OF CARE
Goal Outcome Evaluation:      Plan of Care Reviewed With: patient      Patient vital signs are at baseline: No,  Reason:  BP slightly elevated  Patient able to ambulate as they were prior to admission or with assist devices provided by therapies during their stay:  Yes,  Ambulated in room with 2 assist, walker and gait belt.  Patient MUST void prior to discharge:  Yes Has Faulkner in place and is patent. To be removed POD 2  Patient able to tolerate oral intake:  No,  Reason:  NPO  Pain has adequate pain control using Oral analgesics:  No,  Reason:  Dilaudid PCA pump running  Does patient have an identified :  Yes  Has goal D/C date and time been discussed with patient:  Yes    Pt A&O x 4. VSS. Assist x 2 with gait belt and walker. On oxygen 2 LPM via NC. LS clear. BS hypoactive. Denies nausea. C/o abdominal pain 2/10 at the beginning of shift. Rating pain 013 during this shift. PCA dilaudid infusing.  NPO except for meds or ice chips. NG tube in place and is patent. Abdominal dressing has some scant amount of drainage. PIV infusing LR at 75 mL/hr. , 154, and 125. On heparin subcutaneous q8h.    /58   Pulse 94   Temp 98.7  F (37.1  C) (Oral)   Resp 20   SpO2 98%

## 2023-11-19 NOTE — CONSULTS
[facility]  Colon and Rectal Surgery Consult Note  Name: Rosita Ramirez    MRN: [id number]  YOB: 1955    Age: 68 year old  Date of admission: 11/18/2023  Primary care provider: Melonie Stark     Requesting Physician:  Dr. Jasso  Reason for consult:  cecal volvulus            History of Present Illness:   Rosita Ramirez is a 68 year old female, seen at the request of Dr. Jasso, who presents with with worsening abdominal pain since Thursday. She has not been able to eat since Friday. Was passing flatus and stool. Denies nausea or emesis. Evaluation notable for normal vitals, leukocytosis to 18 and CT A/P with cecal volvulus with cecal dilation to 13cm and possible pneumatosis. Recent Cologuard was negative. Appendectomy as a child.               Past Medical History:     Past Medical History:   Diagnosis Date    Hyperlipidemia     Hypertension              Past Surgical History:     Past Surgical History:   Procedure Laterality Date    APPENDECTOMY      EXTRACTION(S) DENTAL                 Social History:     Social History     Tobacco Use    Smoking status: Never    Smokeless tobacco: Never   Substance Use Topics    Alcohol use: No             Family History:     Family History   Problem Relation Age of Onset    Kidney Disease Mother 80    Diabetes Type 2  Father     Heart Surgery Brother 50        Bypass     Cerebrovascular Disease Maternal Grandmother              Allergies:   No Known Allergies          Medications:      ceFAZolin  2 g Intravenous Pre-Op/Pre-procedure x 1 dose    ceFAZolin  2 g Intravenous See Admin Instructions    metroNIDAZOLE  500 mg Intravenous Pre-Op/Pre-procedure x 1 dose             Review of Systems:   A comprehensive greater than 10 system review of systems was carried out.  Pertinent positives and negatives are noted above.  Otherwise negative for contributory info.            Physical Exam:     Blood pressure 132/73, pulse 97, temperature 98.5   F (36.9  C), temperature source Oral, resp. rate 18, SpO2 92%, not currently breastfeeding.  No intake or output data in the 24 hours ending 11/19/23 0039  Exam:  General - Awake alert and oriented, appears stated age  Pulm - Non-labored breathing with normal respiratory effort  CVS - reg rate and rhythm, no peripheral edema  Abd - soft, focal tenderness in the RLQ.    Neuro - CN II-XII grossly intact  Musculoskeletal - extremities with no clubbing, cyanosis or edema; able to ambulate  Psych - responsive, alert, cooperative; oriented x3; appropriate mood and affect  External/skin - inspection reveals no rashes, lesions or ulcers, normal coloring             Data Reviewed:     Recent Results (from the past 24 hour(s))   CT Abdomen Pelvis w Contrast    Narrative    EXAM: CT ABDOMEN PELVIS W CONTRAST  LOCATION: [facility]  DATE: 11/18/2023    INDICATION: Diffuse abdominal pain.  COMPARISON: None.  TECHNIQUE: CT scan of the abdomen and pelvis was performed following injection of IV contrast. Multiplanar reformats were obtained. Dose reduction techniques were used.  CONTRAST: 100mL Isovue 370    FINDINGS:   LOWER CHEST: Circumferential wall thickening of the distal esophagus. Mild scarring at the lung bases.    HEPATOBILIARY: Normal.    PANCREAS: Normal.    SPLEEN: Normal.    ADRENAL GLANDS: Normal.    KIDNEYS/BLADDER: Normal.    BOWEL: There is a cecal volvulus. The cecum is very distended with gas and fluid measuring up to 13 cm in diameter. There is pneumatosis in the wall of the cecum. Distal small bowel is mildly dilated. Distal colon is decompressed. There is   circumferential wall thickening of the distal esophagus. There is no free intraperitoneal gas or fluid.    LYMPH NODES: Normal.    VASCULATURE: Unremarkable.    PELVIC ORGANS: Normal.    MUSCULOSKELETAL: Degenerative disease in the spine.      Impression    IMPRESSION:   1.  There is a cecal volvulus. The cecum is very distended with  gas and fluid and there is pneumatosis in the wall of the cecum. No portal venous gas.  2.  Distal small bowel is dilated consistent with obstruction secondary to the cecal volvulus.  3.  Circumferential wall thickening of the distal esophagus, inflammatory versus neoplastic.    Critical Result: Cecal volvulus    Finding was identified on 11/18/2023 11:42 PM CST.    Dr. Jasso was contacted by me on 11/18/2023 11:42 PM CST and verbalized understanding of the critical result.          Recent Labs   Lab 11/18/23 2022   WBC 18.0*   HGB 13.1   HCT 38.2   MCV 84        Recent Labs   Lab 11/18/23 2022   *   POTASSIUM 4.0   CHLORIDE 95*   CO2 25   ANIONGAP 14   *   BUN 19.3   CR 0.94   GFRESTIMATED 66   TANI 9.6   PROTTOTAL 7.4   ALBUMIN 4.1   BILITOTAL 0.9   ALKPHOS 88   AST 21   ALT 20         Assessment and Plan:   Rosita Ramirez is a 68 year old female who presented with cecal volvulus.     Plan: OR this morning for an emergent ex lap, right colectomy and possible ileostomy. Discussed risks and benefits in detail including cardiopulmonary complications, thromboembolic complications, bleeding complications, infectious complications as well as the risk and management of anastomotic leak if an ileocolic anastomosis is performed. If the bowel is not healthy enough for an anastomosis, we will create an end ileostomy which will be in place for approximately 3-6 months and then she would be a candidate for closure. She will likely be in the hospital 4-7 days post-operatively for cares. All of her questions were answered and she agreed to proceed with surgery.     Patient specific identified risk factors considered as part of today s evaluation include: obesity, HTN, DM    Additional history obtained from chart.  Time spent on consultation: 45 minutes, greater than 50% spent on counseling and/or coordination of care.      Vandana Kam MD  Colorectal Surgery    [facility]  [address] [place] [address]  [place] [address]  T: [phone]  F: [phone]

## 2023-11-19 NOTE — ED NOTES
[facility]  ED Nurse Handoff Report    ED Chief complaint: Abdominal Pain  . ED Diagnosis:   Final diagnoses:   Generalized abdominal pain   Acute UTI   Cecal volvulus (H)       Allergies: No Known Allergies    Code Status: not verified    Activity level - Baseline/Home:  standby.  Activity Level - Current:   standby.   Lift room needed: No.   Bariatric: No   Needed: No   Isolation: No.   Infection: Not Applicable.     Respiratory status: Room air    Vital Signs (within 30 minutes):   Vitals:    11/18/23 2017   BP: 132/73   Pulse: 97   Resp: 18   Temp: 98.5  F (36.9  C)   TempSrc: Oral   SpO2: 92%       Cardiac Rhythm:  ,      Pain level:    Patient confused: No.   Patient Falls Risk: n/a.   Elimination Status: Has voided     Patient Report - Initial Complaint: Chief Complaint:  Abdominal Pain        The history is provided by the patient.      Rosita Ramirez is a 68 year old female who presents to the ED for abdominal pain for 2-3 days. Patient reports RLQ pain that improves when laying on right side. Patient reports decreased fluid intake. Denies trouble urinating, hematuria, hematochezia, trouble passing stool, vomiting or diarrhea. Reports appendectomy when she was 6 years old.    .   Focused Assessment: emesis x 1 while in ED, comfortable resting on right side, generalized abd discomfort.     Abnormal Results:   Labs Ordered and Resulted from Time of ED Arrival to Time of ED Departure   COMPREHENSIVE METABOLIC PANEL - Abnormal       Result Value    Sodium 134 (*)     Potassium 4.0      Carbon Dioxide (CO2) 25      Anion Gap 14      Urea Nitrogen 19.3      Creatinine 0.94      GFR Estimate 66      Calcium 9.6      Chloride 95 (*)     Glucose 140 (*)     Alkaline Phosphatase 88      AST 21      ALT 20      Protein Total 7.4      Albumin 4.1      Bilirubin Total 0.9     ROUTINE UA WITH MICROSCOPIC REFLEX TO CULTURE - Abnormal    Color Urine Yellow      Appearance Urine  Slightly Cloudy (*)     Glucose Urine Negative      Bilirubin Urine Negative      Ketones Urine 10 (*)     Specific Gravity Urine 1.029      Blood Urine Negative      pH Urine 7.0      Protein Albumin Urine 100 (*)     Urobilinogen Urine Normal      Nitrite Urine Negative      Leukocyte Esterase Urine Large (*)     Bacteria Urine Many (*)     Mucus Urine Present (*)     RBC Urine 5 (*)     WBC Urine 59 (*)     Squamous Epithelials Urine 28 (*)    CBC WITH PLATELETS AND DIFFERENTIAL - Abnormal    WBC Count 18.0 (*)     RBC Count 4.54      Hemoglobin 13.1      Hematocrit 38.2      MCV 84      MCH 28.9      MCHC 34.3      RDW 13.6      Platelet Count 426      % Neutrophils 78      % Lymphocytes 14      % Monocytes 7      % Eosinophils 1      % Basophils 0      % Immature Granulocytes 0      NRBCs per 100 WBC 0      Absolute Neutrophils 13.8 (*)     Absolute Lymphocytes 2.6      Absolute Monocytes 1.3      Absolute Eosinophils 0.2      Absolute Basophils 0.0      Absolute Immature Granulocytes 0.1      Absolute NRBCs 0.0     LIPASE - Normal    Lipase 27     URINE CULTURE        CT Abdomen Pelvis w Contrast   Final Result   IMPRESSION:    1.  There is a cecal volvulus. The cecum is very distended with gas and fluid and there is pneumatosis in the wall of the cecum. No portal venous gas.   2.  Distal small bowel is dilated consistent with obstruction secondary to the cecal volvulus.   3.  Circumferential wall thickening of the distal esophagus, inflammatory versus neoplastic.      Critical Result: Cecal volvulus      Finding was identified on 11/18/2023 11:42 PM CST.      Dr. Jasso was contacted by me on 11/18/2023 11:42 PM CST and verbalized understanding of the critical result.              Treatments provided: meds, NG, imaging  Family Comments: family aware  OBS brochure/video discussed/provided to patient:  No  ED Medications:   Medications   sodium chloride 0.9% BOLUS 1,000 mL (0 mLs Intravenous Stopped 11/18/23  2330)   cefTRIAXone (ROCEPHIN) 1 g vial to attach to  mL bag for ADULTS or NS 50 mL bag for PEDS (1 g Intravenous $New Bag 11/18/23 2331)   iopamidol (ISOVUE-370) solution 500 mL (100 mLs Intravenous $Given 11/18/23 2320)   sodium chloride (PF) 0.9% PF flush 100 mL (65 mLs Intravenous $Given 11/18/23 2320)       Drips infusing:  No  For the majority of the shift this patient was Green.   Interventions performed were meds, NG, imaging .    Sepsis treatment initiated: No    Cares/treatment/interventions/medications to be completed following ED care: n/a    ED Nurse Name: Melonie Jama RN  11:53 PM

## 2023-11-19 NOTE — ED PROVIDER NOTES
History     Chief Complaint:  Abdominal Pain       The history is provided by the patient.      Rosita Ramirez is a 68 year old female who presents to the ED for abdominal pain for 2-3 days. Patient reports RLQ pain that improves when laying on right side. Patient reports decreased fluid intake. Denies trouble urinating, hematuria, hematochezia, trouble passing stool, vomiting or diarrhea. Reports appendectomy when she was 6 years old.     Independent Historian:   None - Patient Only    Review of External Notes:   ::       Medications:    Norvasc  Lipitor  Zestoretic  Metformin  Ozempic    Past Medical History:    Hyperlipidemia  Hypertension    Past Surgical History:    Appendectomy  Dental extractions       Physical Exam   Patient Vitals for the past 24 hrs:   BP Temp Temp src Pulse Resp SpO2   11/18/23 2017 132/73 98.5  F (36.9  C) Oral 97 18 92 %        Physical Exam  Constitutional: Patient is in pain lying on side with some mild distress  Head: Atraumatic.  Eyes: Conjunctivae and EOM are normal. No scleral icterus.  Neck: Normal range of motion. Neck supple.   Cardiovascular: Normal rate, regular rhythm, normal heart sounds and intact distal perfusion.   Pulmonary/Chest: Breath sounds normal. No respiratory distress.  Abdominal: Diffusely tender.   Slight guarding.    Musculoskeletal: Normal range of motion. No edema or tenderness.   Neurological: Alert and orientated to person, place, and time. No observable focal neuro deficit  Skin: Warm and dry. No rash noted. Not diaphoretic.      Emergency Department Course     Imaging:  CT Abdomen Pelvis w Contrast   Final Result   IMPRESSION:    1.  There is a cecal volvulus. The cecum is very distended with gas and fluid and there is pneumatosis in the wall of the cecum. No portal venous gas.   2.  Distal small bowel is dilated consistent with obstruction secondary to the cecal volvulus.   3.  Circumferential wall thickening of the distal esophagus,  inflammatory versus neoplastic.      Critical Result: Cecal volvulus      Finding was identified on 11/18/2023 11:42 PM CST.      Dr. Jasso was contacted by me on 11/18/2023 11:42 PM CST and verbalized understanding of the critical result.                 Laboratory:  Labs Ordered and Resulted from Time of ED Arrival to Time of ED Departure   COMPREHENSIVE METABOLIC PANEL - Abnormal       Result Value    Sodium 134 (*)     Potassium 4.0      Carbon Dioxide (CO2) 25      Anion Gap 14      Urea Nitrogen 19.3      Creatinine 0.94      GFR Estimate 66      Calcium 9.6      Chloride 95 (*)     Glucose 140 (*)     Alkaline Phosphatase 88      AST 21      ALT 20      Protein Total 7.4      Albumin 4.1      Bilirubin Total 0.9     ROUTINE UA WITH MICROSCOPIC REFLEX TO CULTURE - Abnormal    Color Urine Yellow      Appearance Urine Slightly Cloudy (*)     Glucose Urine Negative      Bilirubin Urine Negative      Ketones Urine 10 (*)     Specific Gravity Urine 1.029      Blood Urine Negative      pH Urine 7.0      Protein Albumin Urine 100 (*)     Urobilinogen Urine Normal      Nitrite Urine Negative      Leukocyte Esterase Urine Large (*)     Bacteria Urine Many (*)     Mucus Urine Present (*)     RBC Urine 5 (*)     WBC Urine 59 (*)     Squamous Epithelials Urine 28 (*)    CBC WITH PLATELETS AND DIFFERENTIAL - Abnormal    WBC Count 18.0 (*)     RBC Count 4.54      Hemoglobin 13.1      Hematocrit 38.2      MCV 84      MCH 28.9      MCHC 34.3      RDW 13.6      Platelet Count 426      % Neutrophils 78      % Lymphocytes 14      % Monocytes 7      % Eosinophils 1      % Basophils 0      % Immature Granulocytes 0      NRBCs per 100 WBC 0      Absolute Neutrophils 13.8 (*)     Absolute Lymphocytes 2.6      Absolute Monocytes 1.3      Absolute Eosinophils 0.2      Absolute Basophils 0.0      Absolute Immature Granulocytes 0.1      Absolute NRBCs 0.0     LIPASE - Normal    Lipase 27     URINE CULTURE      Emergency Department  Course & Assessments:       Interventions:  Medications   sodium chloride 0.9% BOLUS 1,000 mL (0 mLs Intravenous Stopped 11/18/23 2330)   cefTRIAXone (ROCEPHIN) 1 g vial to attach to  mL bag for ADULTS or NS 50 mL bag for PEDS (1 g Intravenous $New Bag 11/18/23 2331)   iopamidol (ISOVUE-370) solution 500 mL (100 mLs Intravenous $Given 11/18/23 2320)   sodium chloride (PF) 0.9% PF flush 100 mL (65 mLs Intravenous $Given 11/18/23 2320)        Assessments:  2028 I obtained history and examined the patient as noted above.      Independent Interpretation (X-rays, CTs, rhythm strip):  CT has appearance of cecal volvulus    Consultations/Discussion of Management or Tests:  2348 I spoke with Dr. Kam, Colorectal, regarding the patient.        Social Determinants of Health affecting care:   None    Disposition:  The patient was admitted to the hospital under the care of hospitalist and colorectal with plan for OR    Impression & Plan      Medical Decision Making:  Pt comes in with abdominal and flank pain with previous appendectomy with nausea intermittent vomiting.  No bile blood or feculent.  UA does show signs of UTI WBC elev and CT confirms as well cecal volvulus with upstream distention.  Planning for NG already got abx for UTI and going to OR with colorectal with med surg admission.  Stable but guarded patient updated.        Diagnosis:    ICD-10-CM    1. Generalized abdominal pain  R10.84       2. Acute UTI  N39.0       3. Cecal volvulus (H)  K56.2            Discharge Medications:  New Prescriptions    No medications on file          Scribe Disclosure:  I, Haley Donohue, am serving as a scribe at 8:37 PM on 11/18/2023 to document services personally performed by Breezy Jasso MD based on my observations and the provider's statements to me.   11/18/2023   Breezy Jasso MD Stevens, Andrew C, MD  11/19/23 0002

## 2023-11-19 NOTE — BRIEF OP NOTE
[facility]    Brief Operative Note    Pre-operative diagnosis: Large bowel obstruction (H) [K56.609]  Post-operative diagnosis Same as pre-operative diagnosis    Procedure: open right Colectomy, N/A - Abdomen    Surgeon: Surgeon(s) and Role:     * Vandana Kam MD - Primary     * Fouzia Bose MD - Assisting  Anesthesia: General   Estimated Blood Loss: 30 mL from 11/19/2023  1:00 AM to 11/19/2023  2:41 AM      Drains: None  Specimens:   ID Type Source Tests Collected by Time Destination   1 : Terinal ileum and right colon Tissue Small Intestine, Terminal Ileum SURGICAL PATHOLOGY EXAM Vandana Kam MD 11/19/2023  1:52 AM      Findings:   Severely dilated and dusky cecum without tari gangrene or perforation .  Complications: None.  Implants: * No implants in log *        Fouzia Bose MD      ADDENDUM:    PATIENT DATA  Indicate Y or N:  Home O2 No  Hemodialysis No  Transplant patient No  Cirrhosis No  Steroids in last 30 days No  Immunomodulators in last 30 days No  Anticoagulation at time of surgery No   List medication N/A  Prior abdominal surgery No  Pelvic irradiation No    Albumin within 30 days if known 4.1  Hgb within 30 days if known 13.1  Cr within 30 days if known 0.94  There is no height or weight on file to calculate BMI.    OR DATA  Emergent Yes   <24 hours Yes   <1 week No  Bowel Prep (Y or N) No  Antibiotics (Y or N) Yes  DVT prophylaxis    Heparin No   SCD Yes   None Yes  Drain No  ASA (1,2,3,4,5 if unknown) 4  OR time (min) 80  Stents No  Transfuse >/= 2U No  Anastomosis   Stapled Yes   Handsewn No  Leak Test (pos, neg, not done) not done

## 2023-11-19 NOTE — PLAN OF CARE
Pertinent assessments: Arrived from PACU around 0445. A&Ox4. VSS on 2L - capnography in place. Denies pain, appears comfortable at rest. Abd inc covered with small amount of dried drainage. Faulkner patent with good output. NGT to low int suction. Slid over with hover mat Ax2. NPO ex ice & meds.     Major Shift Events: Admit from PACU  Treatment Plan: Pain control. NPO ex ice & meds. Await return of bowel function.   Bedside Nurse: Nicole Simmons RN

## 2023-11-19 NOTE — PROGRESS NOTES
Notified provider about indwelling corea catheter discussed removal or continued need.    Did provider choose to remove indwelling corea catheter? NO    Provider's corea indication for keeping indwelling corea catheter: Indication for continued use: /GI/GYN Pelvic Procedure    Is there an order for indwelling corea catheter? YES    *If there is a plan to keep corea catheter in place at discharge daily notification with provider is not necessary, but please add a notation in the treatment team sticky note that the patient will be discharging with the catheter.

## 2023-11-19 NOTE — OP NOTE
PREOPERATIVE DIAGNOSIS: cecal volvulus     POSTOPERATIVE DIAGNOSIS: Same    OPERATION PERFORMED.  1. Exploratory laparotomy  2. Open right colectomy    SURGEON:  Vandana Kam MD     ASSISTANT:  Fouzia Bose M.D., Colon and Rectal Surgery Fellow    ANESTHESIA:  General.    INDICATIONS:  Rosita Ramirez is a 68 year old woman who presented with 2 to 3 days of worsening abdominal pain.  Evaluation in the ER was notable for normal vital signs, a leukocytosis, and CT scan of the abdomen and pelvis demonstrated a cecal volvulus with cecal dilation at 13 cm as well as concern for pneumatosis suggestive of ischemia.  She had focal peritonitis on physical examination.  She was recommended to undergo an exploratory laparotomy with an urgent right colectomy with a possible ileostomy. The risks and benefits of this procedure were discussed, and they agreed to proceed.    OPERATIVE FINDINGS: Cecal volvulus with significantly dilated and ischemic cecum with some serosal tears.  No evidence of perforation or full-thickness necrosis.  Otherwise normal intra-abdominal anatomy.    PROCEDURE IN DETAIL:  After informed consent was obtained, the patient was brought to the operating room. They were placed in the supine position on the operating room table.  Sequential compression devices were placed on the lower extremities prior to induction, and general anesthesia was induced without difficulty.  A Faulkner catheter was inserted and IV antibiotics administered.  The abdomen was sterilely prepped and draped in the usual fashion.    A midline incision was made in the skin with a #10 blade and was carried down through the subcutaneous tissue and fat with Bovie electrocautery to reach the midline abdominal fascia.  The fascia was carefully incised with cautery.  An Ric wound protector was inserted and the Bookwalter retractor used for abdominal retraction throughout the case.  This significantly dilated cecum was easily delivered  through the midline incision.  The cecum was then untwisted, and appeared dusky but was not frankly necrotic.  It was significantly dilated with some serosal tears along the tinea.    We then mobilized the lateral attachments to the ascending colon, and continue this dissection around the hepatic flexure.  We did mobilize the omentum off of the proximal transverse colon in order to get adequate mobilization to healthy, and normal ascending colon.  Once we had adequate mobilization, a window was made in the mesentery at the terminal ileal bowel wall, and the terminal ileum divided with a single firing of the 75 mm ANA MARIA stapler, blue load.  We then continued with division of the mesentery using the LigaSure device up to the level of the ileocolic pedicles.  The ileocolic vessels were then isolated, and a Carmalt clamp was placed across the pedicle.  The pedicle was then divided with the LigaSure device, and a suture ligature placed with a 2-0 Vicryl suture.  There was good hemostasis.  We then continued our mesenteric dissection up to the level of the bowel wall in the ascending colon where there was a normal, healthy colon.  The colon was then divided with a single firing of the 75 mm ANA MARIA stapler, blue load.      Once all the lateral attachments and retroperitoneal attachments were fully mobilized, the colon was elevated toward the anterior abdominal wall revealing the origin of the ileocolic vessels on stretch.  We then made a window in the mesentery of the distal terminal ileum at our selected transection point, and used the LigaSure to divide the terminal ileum and colon mesentery up to the level of the ileocolic vessels.  We then repeated this at our distal transsection point by making a window in the mesentery in the mid transverse colon, and divided the intervening mesentery up to the the ileocolic vessels.  The right branch of the middle colic artery was divided during this dissection. The ileocolic vessels  were divided at the origin using the LigaSure device with a quadruple seal technique.  The duodenum was protected throughout this dissection and at the end of the mesenteric transection hemostasis was evident.     We then proceeded with creating our ileocolic anastomosis.  The colon and terminal ileum were placed in close proximity, and we ensured that there was no twisting of the mesentery.  Enterotomies were made in the antimesenteric border of the ascending colon and the terminal ileum.  A common channel was created with a single firing of the 75 mm ANA MARIA stapler, blue load.  The common enterotomy and the remaining staple lines on the small bowel and colon were then resected and closed with a single firing of the TA stapler, 90 mm, blue load.  The TA staple line was inspected and was noted to be hemostatic.  2 stitches were placed in the crotch of the anastomosis to reduce tension.  The TA staple line was then oversewn and inverted using interrupted 3-0 Vicryl Lembert sutures. The anastomosis was returned to the abdomen.     The abdomen was irrigated with a copious amount of saline solution and sucked dry.  Hemostasis was adequate.  The remaining omentum was brought down over the small bowel and the anastomosis prior to closure. The midline abdominal fascia was closed using a running #0 looped PDS suture from either end. The subcutaneous tissue was irrigated with saline solution and the skin reapproximated with staples.      The patient tolerated the procedure well without complications.  At the end of the procedure, all needle sponge and instrument counts were correct.  The patient was extubated in the operating room and taken to the recovery room in stable condition.    EBL: 30 ml  SPECIMEN: terminal ileum and right colon   COMPLICATIONS: none

## 2023-11-19 NOTE — ED TRIAGE NOTES
Pt arrives from home via EMS or 2-3 days o epigastric pain that radiates to RLQ. Pain decreases when laying on R side. No vomiting or diarrhea. Pt had appendix out as child. VSS, A&Ox4     Triage Assessment (Adult)       Row Name 11/18/23 2017          Triage Assessment    Airway WDL WDL        Respiratory WDL    Respiratory WDL WDL        Skin Circulation/Temperature WDL    Skin Circulation/Temperature WDL WDL        Cardiac WDL    Cardiac WDL WDL        Peripheral/Neurovascular WDL    Peripheral Neurovascular WDL WDL        Cognitive/Neuro/Behavioral WDL    Cognitive/Neuro/Behavioral WDL WDL

## 2023-11-19 NOTE — ANESTHESIA PREPROCEDURE EVALUATION
Anesthesia Pre-Procedure Evaluation    Patient: Rosita Ramirez   MRN: [id number] : 1955        Procedure : Procedure(s):  Open right Colectomy with colostomy, combined          Past Medical History:   Diagnosis Date    Hyperlipidemia     Hypertension       Past Surgical History:   Procedure Laterality Date    APPENDECTOMY      EXTRACTION(S) DENTAL        No Known Allergies   Social History     Tobacco Use    Smoking status: Never    Smokeless tobacco: Never   Substance Use Topics    Alcohol use: No      Wt Readings from Last 1 Encounters:   10/20/23 105.7 kg (233 lb)        Anesthesia Evaluation            ROS/MED HX  ENT/Pulmonary:  - neg pulmonary ROS     Neurologic:  - neg neurologic ROS     Cardiovascular:     (+)  hypertension- -   -  - -                                      METS/Exercise Tolerance: >4 METS    Hematologic:  - neg hematologic  ROS     Musculoskeletal:  - neg musculoskeletal ROS     GI/Hepatic: Comment: SBO      Renal/Genitourinary:  - neg Renal ROS     Endo:     (+)  type II DM,             Obesity,       Psychiatric/Substance Use:  - neg psychiatric ROS     Infectious Disease:  - neg infectious disease ROS     Malignancy:  - neg malignancy ROS     Other:  - neg other ROS          Physical Exam    Airway        Mallampati: II   TM distance: > 3 FB   Neck ROM: full   Mouth opening: > 3 cm    Respiratory Devices and Support         Dental       (+) Completely normal teeth      Cardiovascular   cardiovascular exam normal       Rhythm and rate: regular and normal     Pulmonary   pulmonary exam normal        breath sounds clear to auscultation           OUTSIDE LABS:  CBC:   Lab Results   Component Value Date    WBC 18.0 (H) 2023    WBC 6.8 10/20/2023    HGB 13.1 2023    HGB 12.8 10/20/2023    HCT 38.2 2023    HCT 38.5 10/20/2023     2023     10/20/2023     BMP:   Lab Results   Component Value Date     (L) 2023     10/20/2023     "POTASSIUM 4.0 11/18/2023    POTASSIUM 4.2 10/20/2023    CHLORIDE 95 (L) 11/18/2023    CHLORIDE 100 10/20/2023    CO2 25 11/18/2023    CO2 25 10/20/2023    BUN 19.3 11/18/2023    BUN 20.1 10/20/2023    CR 0.94 11/18/2023    CR 0.82 10/20/2023     (H) 11/18/2023     (H) 10/20/2023     COAGS: No results found for: \"PTT\", \"INR\", \"FIBR\"  POC: No results found for: \"BGM\", \"HCG\", \"HCGS\"  HEPATIC:   Lab Results   Component Value Date    ALBUMIN 4.1 11/18/2023    PROTTOTAL 7.4 11/18/2023    ALT 20 11/18/2023    AST 21 11/18/2023    ALKPHOS 88 11/18/2023    BILITOTAL 0.9 11/18/2023     OTHER:   Lab Results   Component Value Date    A1C 6.1 (H) 10/20/2023    TANI 9.6 11/18/2023    LIPASE 27 11/18/2023       Anesthesia Plan    ASA Status:  4, emergent    NPO Status:  ELEVATED Aspiration Risk/Unknown    Anesthesia Type: General.     - Airway: ETT   Induction: Intravenous.   Maintenance: Balanced.   Techniques and Equipment:     - Airway: Video-Laryngoscope       Consents    Anesthesia Plan(s) and associated risks, benefits, and realistic alternatives discussed. Questions answered and patient/representative(s) expressed understanding.     - Discussed: Risks, Benefits and Alternatives for the PROCEDURE were discussed     - Discussed with:  Patient       Use of blood products discussed: Yes.     - Discussed with: Patient.     - Consented: consented to blood products            Reason for refusal: other.     Postoperative Care    Pain management: IV analgesics, Oral pain medications.   PONV prophylaxis: Ondansetron (or other 5HT-3), Dexamethasone or Solumedrol     Comments:    Other Comments: SBO due to cecal volvulus and on ozempic, increased risk of aspiration. BMI 40    Plan: RSI and GETA with VL. Methadone 10 mg IV for pain.            Leeann May MD  "

## 2023-11-19 NOTE — PHARMACY-ADMISSION MEDICATION HISTORY
Pharmacist Admission Medication History    Admission medication history is complete. The information provided in this note is only as accurate as the sources available at the time of the update.    Information Source(s): Patient and CareEverywhere/SureScripts via in-person    Pertinent Information: Had Semaglutide 2 mg but has not increased to 2mg, still on 1mg. Stopped taking Metformin herself as she believe it was causing gallstones.     Changes made to PTA medication list:  Added: None  Deleted: Ozempic 2mg, Metformin  Changed: None    Medication Affordability:  Not including over the counter (OTC) medications, was there a time in the past 3 months when you did not take your medications as prescribed because of cost?: No    Allergies reviewed with patient and updates made in EHR: no    Medication History Completed By: Hetal Barbosa RPH 11/19/2023 9:02 AM    PTA Med List   Medication Sig Last Dose    amLODIPine (NORVASC) 10 MG tablet Take 1 tablet (10 mg) by mouth daily 11/18/2023    atorvastatin (LIPITOR) 10 MG tablet Take 1 tablet (10 mg) by mouth daily 11/17/2023    latanoprost (XALATAN) 0.005 % ophthalmic solution Place 1 drop into both eyes at bedtime 11/17/2023    lisinopril-hydrochlorothiazide (ZESTORETIC) 20-12.5 MG tablet Take 1 tablet by mouth daily 11/17/2023    Semaglutide, 1 MG/DOSE, (OZEMPIC, 1 MG/DOSE,) 4 MG/3ML pen INJECT 1 MG SUBCUTANEOUS EVERY 7 DAYS Past Week    timolol hemihydrate (BETIMOL) 0.5 % SOLN ophthalmic solution Place 1 drop into both eyes 2 times daily 11/18/2023

## 2023-11-19 NOTE — ANESTHESIA PROCEDURE NOTES
Airway       Patient location during procedure: OR       Procedure Start/Stop Times: 11/19/2023 1:08 AM  Staff -        CRNA: Krunal Fajardo APRN CRNA       Performed By: CRNA  Consent for Airway        Urgency: elective  Indications and Patient Condition       Indications for airway management: tre-procedural       Induction type:intravenous       Mask difficulty assessment: 0 - not attempted    Final Airway Details       Final airway type: endotracheal airway       Successful airway: ETT - single and Oral  Endotracheal Airway Details        ETT size (mm): 7.0       Cuffed: yes       Successful intubation technique: direct laryngoscopy       DL Blade Type: Irvin 2       Grade View of Cords: 1       Adjucts: stylet       Position: Right       Measured from: lips       Secured at (cm): 22       Bite block used: None    Post intubation assessment        Placement verified by: capnometry, equal breath sounds and chest rise        Number of attempts at approach: 1       Secured with: tape       Ease of procedure: easy       Dentition: Intact and Unchanged    Medication(s) Administered   Medication Administration Time: 11/19/2023 1:08 AM

## 2023-11-19 NOTE — ANESTHESIA POSTPROCEDURE EVALUATION
Patient: Rosita Ramirez    Procedure: Procedure(s):  open right Colectomy       Anesthesia Type:  General    Note:  Disposition: Admission   Postop Pain Control: Uneventful            Sign Out: Well controlled pain   PONV: No   Neuro/Psych: Uneventful            Sign Out: Acceptable/Baseline neuro status   Airway/Respiratory: Uneventful            Sign Out: Acceptable/Baseline resp. status   CV/Hemodynamics: Uneventful            Sign Out: Acceptable CV status; No obvious hypovolemia; No obvious fluid overload   Other NRE: NONE   DID A NON-ROUTINE EVENT OCCUR? No           Last vitals:  Vitals Value Taken Time   /76 11/19/23 0247   Temp     Pulse 90 11/19/23 0249   Resp 20 11/19/23 0250   SpO2 80 % 11/19/23 0250   Vitals shown include unfiled device data.    Electronically Signed By: Leeann May MD  November 19, 2023  2:51 AM

## 2023-11-19 NOTE — PROGRESS NOTES
[facility]  PROGRESS NOTE    November 19, 2023  Post-op Day # 0    SUBJECTIVE:  Pain controlled. NG with brown output. Reviewed operative findings.     OBJECTIVE:  Temp:  [97.2  F (36.2  C)-98.5  F (36.9  C)] 98.4  F (36.9  C)  Pulse:  [] 90  Resp:  [14-21] 21  BP: (122-149)/(53-82) 149/62  SpO2:  [92 %-100 %] 98 %    Intake/Output Summary (Last 24 hours) at 11/19/2023 0905  Last data filed at 11/19/2023 0600  Gross per 24 hour   Intake 2201 ml   Output 430 ml   Net 1771 ml       GENERAL:  Awake, alert, no acute distress  EXTREMITIES: Warm and well perfused, no edema   ABDOMEN:  Soft, appropriately tender, non-distended. No guarding, rigidity, or peritoneal signs.   INCISION:  C/d/i    LABS:  Lab Results   Component Value Date    WBC 16.4 11/19/2023     Lab Results   Component Value Date    HGB 12.9 11/19/2023     Lab Results   Component Value Date    HCT 38.6 11/19/2023     Lab Results   Component Value Date     11/19/2023     Last Basic Metabolic Panel:  Lab Results   Component Value Date     11/19/2023     06/22/2021      Lab Results   Component Value Date    POTASSIUM 3.9 11/19/2023    POTASSIUM 4.4 09/27/2022    POTASSIUM 4.2 06/22/2021     Lab Results   Component Value Date    CHLORIDE 100 11/19/2023    CHLORIDE 102 09/27/2022    CHLORIDE 105 06/22/2021     Lab Results   Component Value Date    TANI 8.7 11/19/2023    TANI 9.0 06/22/2021     Lab Results   Component Value Date    CO2 25 11/19/2023    CO2 27 09/27/2022    CO2 26 06/22/2021     Lab Results   Component Value Date    BUN 17.6 11/19/2023    BUN 17 09/27/2022    BUN 17 06/22/2021     Lab Results   Component Value Date    CR 0.72 11/19/2023    CR 0.71 06/22/2021     Lab Results   Component Value Date     11/19/2023     11/19/2023     09/27/2022     06/22/2021       ASSESSMENT/PLAN: Rosita Ramirez is a 68 year old female POD # 0 s/p open right colectomy for sigmoid volvulus.     NPO.  Continue NG. IVF.   Multimodal pain control - scheduled tylenol, PCA, PRN toradol .   Continue Faulkner today. Plan to remove tomorrow.   OOB, ambulate  PT/OT   SQH and SCDs for dvt ppx  Appreciate Hospitalist team assistance with management of medical co-morbidities.       For questions/paging, please contact the CRS office at [phone].    Vandana Kam MD  Colorectal Surgery    [facility]  [address] [address]  T: [phone]  F: [phone]

## 2023-11-19 NOTE — PROGRESS NOTES
[facility]    Medicine Progress Note - Hospitalist Service    Date of Admission:  11/18/2023    Primary Care Physician   Melonie Stark  CONSULTANTS: Colorectal surgery    Assessment & Plan     Rosita Ramirez is a 68 year old female with PMHx of type 2 diabetes, hypertension, hyperlipidemia,  who was admitted on 11/18/2023 with cecal volvulus. Colorectal surgery was consulted for operative management.     Cecal volvulus with cecal wall pneumatosis with Small bowel obstruction, Leukocytosis, right open colectomy (11/19)  Patient presented with 3 days of abdominal pain. Last BM Thursday 11/16. She came in after developing nausea and vomiting.  She has a history of appendectomy as a child. Labs notable for WBC of 18, normal lipase, LFTs, and BMP. Imaging in the emergency room with abdomen CT showed cecal volvulus with pneumatosis in wall of cecum, as well as obstruction with dilated distal small bowel.  She was made NPO, placed on IV fluids, and had an NG tube placed.  She was taken for surgery on 11/19/23 having a right open colectomy for sigmoid volvulus.  She did get started on antibiotics with rocephin which were stopped after surgery.  She is NPO and has an NG in until return of function.  Pain controlled with dilaudid PCA, Toradol. Pathology pending.  Hopefully Faulkner can come out tomorrow need to start getting up and about and should use incentive spirometry.     Circumferential wall thickening of distal esophagus  Noted on CT. Likely inflammatory due to nausea and vomiting related to above. Differential includes neoplastic. Outpatient follow up.      Asymptomatic bacteriuria   Abnormal UA on admission with large leukocyte esterase and negative nitrite. Patient denies urinary symptoms.  urine culture pending.      Type 2 diabetes mellitus  On metformin and semaglutide at home.    on arrival.  Has been stable now at 163.  Continue on insulin correction scale     Hypertension  Blood  pressures been 122-149/62-80.  Has as needed hydralazine available.Patient's home Norvasc, lisinopril/hydrochlorothiazide has been held until she is able to take oral per    Hyperlipidemia  Patient's home Lipitor has been held.    Glaucoma - has metal ocular implants that are not MRI compatible , resume her home timolol and xalatan    Obesity  BMI 40 as of 11/19/23.  Complicates cares       4Ms:  Polypharmacy: Medications reviewed and currently appropriate  Mentation:   Capacity intact make her own medical decisions  Social support: Patient lives in a house by herself.  She is  a year ago.  Did not have a lot of nice things to say about her .  Patient's children are local.  Mobility: Does not use aids  What is importmant: To get back home      Discussed plan of care with nursing      Diet: NPO for Medical/Clinical Reasons Except for: Meds, Ice Chips    DVT Prophylaxis: Heparin SQ  Faulkner Catheter: PRESENT, indication: /GI/GYN Pelvic Procedure  Lines: None     Cardiac Monitoring: None    RESTRAINTS: not indicated  Code Status: Full Code        Follow up plan: We will need outpatient follow-up for wound checks with surgery to follow-up her colectomy.     This document was created using voice recognition technology.  Please excuse any typographical errors that may have occurred.  Please call with any questions.         Clinically Significant Risk Factors Present on Admission                  # Hypertension: Noted on problem list                 Disposition Plan      Expected Discharge Date: 11/21/2023                  Barrier to discharge: Return of bowel function, able to eat, and pain control.  Timing will be per surgery    Margarito Lewis MD  Hospitalist Service  [facility]  Securely message with [employer] (more info)  Text page via [employer] Paging/Directory   ______________________________________________________________________    Interval History   Patient is new to me found  lying in bed postoperative day 0 today.  Patient came in with abdominal pain found to have a cecal volvulus and required urgent surgery.  Pain currently is well controlled.  Blood sugars and vital signs are as well.  She has an NG tube in place and denies any current vomiting.      ROS: A comprehensive review of systems was negative except for items noted in the HPI.  Patient currently denies any fever, chills, sweats, nausea, vomiting, diarrhea, shortness of breath, or chest pain.      Physical Exam   Vital Signs: Temp: 98.4  F (36.9  C) Temp src: Oral BP: (!) 149/62 Pulse: 90   Resp: 21 SpO2: 98 % O2 Device: Nasal cannula Oxygen Delivery: 3 LPM  Weight: 0 lbs 0 oz      General appearance: Patient is alert and oriented x3, no apparent distress, pleasant and conversing normally, speaking in full sentences, appears older than stated age, lying on her left side in bed  HEENT:    Mucous membranes are moist, wearing nasal cannula, NG in place  RESPIRATORY: Clear to auscultation bilateral, good air movement  CARDIOVASCULAR: Regular rate and rhythm, normal S1/S2, no murmurs, rubs, or gallops present, peripheral pulses intact  GASTROINTESTINAL: Non-distended, appropriate postop tenderness, soft, bowel sounds absent., midline surgical incision dressed with blood  MUSCULOSKELETAL: without deformity, normal range of motion  SKIN:  Warm, dry, no rashes, no mottling  NEUROLOGIC:  Cranial nerves II-XII intact, without any focal deficits, strength 5/5 throughout  EXTREMITIES:  Moves all extremities, no clubbing, cyanosis, nor edema  :  Faulkner is present         Data     I have personally reviewed the following data over the past 24 hrs:    16.4 (H)  \   12.9   / 399     136 100 17.6 /  164 (H)   3.9 25 0.72 \     ALT: 20 AST: 21 AP: 88 TBILI: 0.9   ALB: 4.1 TOT PROTEIN: 7.4 LIPASE: 27       Imaging:   Results for orders placed or performed during the hospital encounter of 11/18/23   CT Abdomen Pelvis w Contrast    Narrative     EXAM: CT ABDOMEN PELVIS W CONTRAST  LOCATION: [facility]  DATE: 11/18/2023    INDICATION: Diffuse abdominal pain.  COMPARISON: None.  TECHNIQUE: CT scan of the abdomen and pelvis was performed following injection of IV contrast. Multiplanar reformats were obtained. Dose reduction techniques were used.  CONTRAST: 100mL Isovue 370    FINDINGS:   LOWER CHEST: Circumferential wall thickening of the distal esophagus. Mild scarring at the lung bases.    HEPATOBILIARY: Normal.    PANCREAS: Normal.    SPLEEN: Normal.    ADRENAL GLANDS: Normal.    KIDNEYS/BLADDER: Normal.    BOWEL: There is a cecal volvulus. The cecum is very distended with gas and fluid measuring up to 13 cm in diameter. There is pneumatosis in the wall of the cecum. Distal small bowel is mildly dilated. Distal colon is decompressed. There is   circumferential wall thickening of the distal esophagus. There is no free intraperitoneal gas or fluid.    LYMPH NODES: Normal.    VASCULATURE: Unremarkable.    PELVIC ORGANS: Normal.    MUSCULOSKELETAL: Degenerative disease in the spine.      Impression    IMPRESSION:   1.  There is a cecal volvulus. The cecum is very distended with gas and fluid and there is pneumatosis in the wall of the cecum. No portal venous gas.  2.  Distal small bowel is dilated consistent with obstruction secondary to the cecal volvulus.  3.  Circumferential wall thickening of the distal esophagus, inflammatory versus neoplastic.    Critical Result: Cecal volvulus    Finding was identified on 11/18/2023 11:42 PM CST.    Dr. Jasso was contacted by me on 11/18/2023 11:42 PM CST and verbalized understanding of the critical result.        Procedures: Open right colectomy for sigmoid volvulus 11/19/23    I have personally have reviewed the patient's most up to date radiologic exams,  labs, orders, and medications myself

## 2023-11-19 NOTE — ANESTHESIA CARE TRANSFER NOTE
Patient: Rosita Ramirez    Procedure: Procedure(s):  open right Colectomy       Diagnosis: Large bowel obstruction (H) [K56.609]  Diagnosis Additional Information: No value filed.    Anesthesia Type:   General     Note:    Oropharynx: oropharynx clear of all foreign objects  Level of Consciousness: awake  Oxygen Supplementation: face mask  Level of Supplemental Oxygen (L/min / FiO2): 6  Independent Airway: airway patency satisfactory and stable  Dentition: dentition unchanged  Vital Signs Stable: post-procedure vital signs reviewed and stable  Report to RN Given: handoff report given  Patient transferred to: PACU    Handoff Report: Identifed the Patient, Identified the Reponsible Provider, Reviewed the pertinent medical history, Discussed the surgical course, Reviewed Intra-OP anesthesia mangement and issues during anesthesia, Set expectations for post-procedure period and Allowed opportunity for questions and acknowledgement of understanding      Vitals:  Vitals Value Taken Time   BP     Temp     Pulse 92 11/19/23 0245   Resp 15 11/19/23 0245   SpO2 99 % 11/19/23 0245   Vitals shown include unfiled device data.    Electronically Signed By: CACHORRO Faustin CRNA  November 19, 2023  2:46 AM

## 2023-11-20 ENCOUNTER — APPOINTMENT (OUTPATIENT)
Dept: PHYSICAL THERAPY | Facility: CLINIC | Age: 68
DRG: 330 | End: 2023-11-20
Attending: INTERNAL MEDICINE
Payer: COMMERCIAL

## 2023-11-20 LAB
BACTERIA UR CULT: NORMAL
GLUCOSE BLDC GLUCOMTR-MCNC: 106 MG/DL (ref 70–99)
GLUCOSE BLDC GLUCOMTR-MCNC: 111 MG/DL (ref 70–99)
GLUCOSE BLDC GLUCOMTR-MCNC: 113 MG/DL (ref 70–99)
GLUCOSE BLDC GLUCOMTR-MCNC: 140 MG/DL (ref 70–99)
GLUCOSE BLDC GLUCOMTR-MCNC: 86 MG/DL (ref 70–99)

## 2023-11-20 PROCEDURE — 97530 THERAPEUTIC ACTIVITIES: CPT | Mod: GP

## 2023-11-20 PROCEDURE — 250N000011 HC RX IP 250 OP 636: Performed by: COLON & RECTAL SURGERY

## 2023-11-20 PROCEDURE — 99232 SBSQ HOSP IP/OBS MODERATE 35: CPT | Performed by: INTERNAL MEDICINE

## 2023-11-20 PROCEDURE — 97162 PT EVAL MOD COMPLEX 30 MIN: CPT | Mod: GP

## 2023-11-20 PROCEDURE — 120N000001 HC R&B MED SURG/OB

## 2023-11-20 PROCEDURE — 97116 GAIT TRAINING THERAPY: CPT | Mod: GP

## 2023-11-20 RX ADMIN — HEPARIN SODIUM 5000 UNITS: 5000 INJECTION, SOLUTION INTRAVENOUS; SUBCUTANEOUS at 15:38

## 2023-11-20 RX ADMIN — LATANOPROST 1 DROP: 50 SOLUTION OPHTHALMIC at 21:00

## 2023-11-20 RX ADMIN — HEPARIN SODIUM 5000 UNITS: 5000 INJECTION, SOLUTION INTRAVENOUS; SUBCUTANEOUS at 08:47

## 2023-11-20 RX ADMIN — TIMOLOL MALEATE 1 DROP: 5 SOLUTION/ DROPS OPHTHALMIC at 08:48

## 2023-11-20 ASSESSMENT — ACTIVITIES OF DAILY LIVING (ADL)
ADLS_ACUITY_SCORE: 22
ADLS_ACUITY_SCORE: 21
ADLS_ACUITY_SCORE: 22
ADLS_ACUITY_SCORE: 21
ADLS_ACUITY_SCORE: 22
ADLS_ACUITY_SCORE: 21
ADLS_ACUITY_SCORE: 22

## 2023-11-20 NOTE — PROGRESS NOTES
11/20/23 1500   Appointment Info   Signing Clinician's Name / Credentials (PT) Zainab Simental, PT, DPT   Rehab Comments (PT) Abdominal prec, abdminal binder for comfort   Living Environment   People in Home alone   Current Living Arrangements house   Home Accessibility stairs within home   Number of Stairs, Within Home, Primary greater than 10 stairs   Transportation Anticipated family or friend will provide   Living Environment Comments Patient lives alone in house.  All needs are met on main floor except laundry in the basement.  Patient drives at baseline, plans to have son or daught-in-law assist with transportation at discharge.   Self-Care   Usual Activity Tolerance good   Current Activity Tolerance good   Regular Exercise No   Equipment Currently Used at Home none   Fall history within last six months no   Activity/Exercise/Self-Care Comment Patient reports baseline independence with I/ADLs and mobility.  Patient is retired but babysits often.   General Information   Onset of Illness/Injury or Date of Surgery 11/18/23   Referring Physician Margarito Lewis MD   Patient/Family Therapy Goals Statement (PT) Patient would like to return home at discharge, hopes to be independent.   Pertinent History of Current Problem (include personal factors and/or comorbidities that impact the POC) 68 year old female with PMHx of type 2 diabetes, hypertension, hyperlipidemia,  who was admitted on 11/18/2023 with cecal volvulus.  Now s/p open right colectomy. Awaiting return of bowel function.   Existing Precautions/Restrictions abdominal;fall   Cognition   Affect/Mental Status (Cognition) WFL   Orientation Status (Cognition) oriented x 3   Follows Commands (Cognition) WFL   Pain Assessment   Patient Currently in Pain Yes, see Vital Sign flowsheet  (Abdominal pain, has PCA)   Integumentary/Edema   Integumentary/Edema Comments Abdominal incision   Posture    Posture Forward head position;Protracted shoulders   Range of  Motion (ROM)   Range of Motion ROM deficits secondary to surgical procedure   ROM Comment Abdominal precautions   Strength (Manual Muscle Testing)   Strength (Manual Muscle Testing) Able to perform R SLR;Able to perform L SLR;Deficits observed during functional mobility   Bed Mobility   Comment, (Bed Mobility) SBA for supine > sit transfer with HOB elevated ~30 degrees and UE support on bed rail.   Transfers   Comment, (Transfers) SBA sit <> stand.   Gait/Stairs (Locomotion)   Comment, (Gait/Stairs) Quickly progressing from CGA to SBA while ambulating 15' with FWW, slow gait speed and poor line awareness.   Balance   Balance Comments Impaired dynamic balance secondary to recent abdominal surgery   Sensory Examination   Sensory Perception patient reports no sensory changes   Clinical Impression   Criteria for Skilled Therapeutic Intervention Yes, treatment indicated   PT Diagnosis (PT) Impaired functional mobility   Influenced by the following impairments Abdominal incision, abdominal precautions, abdominal pain, decreased activity tolerance, impaired balance, weakness   Functional limitations due to impairments Impaired independence with bed mobility, transfers, gait, and stairs   Clinical Presentation (PT Evaluation Complexity) evolving   Clinical Presentation Rationale Clinical judgement, PMH, social support   Clinical Decision Making (Complexity) moderate complexity   Planned Therapy Interventions (PT) balance training;bed mobility training;cryotherapy;gait training;home exercise program;neuromuscular re-education;patient/family education;postural re-education;stair training;strengthening;transfer training;progressive activity/exercise   Risk & Benefits of therapy have been explained evaluation/treatment results reviewed;care plan/treatment goals reviewed;risks/benefits reviewed;participants voiced agreement with care plan;participants included;patient   PT Total Evaluation Time   PT Eval, Moderate Complexity  Minutes (02892) 10   Physical Therapy Goals   PT Frequency Daily   PT Predicted Duration/Target Date for Goal Attainment 11/24/23   PT Goals Bed Mobility;Transfers;Gait;Stairs   PT: Bed Mobility Modified independent;Supine to/from sit;Rolling;Within precautions  (Logroll)   PT: Transfers Modified independent;Sit to/from stand;Bed to/from chair;Assistive device;Within precautions   PT: Gait Modified independent;150 feet;Rolling walker;Within precautions   PT: Stairs Modified independent;10 stairs   Interventions   Interventions Quick Adds Therapeutic Activity;Gait Training   Therapeutic Activity   Therapeutic Activities: dynamic activities to improve functional performance Minutes (00373) 13   Symptoms Noted During/After Treatment Increased pain   Treatment Detail/Skilled Intervention Patient greeted supine in bed, awake.  Patient agreeble to PT session, reporting continued abdominal pain but has PCA pump.  Patient receiving 2 L O2 via NC, resting SpO2 90-91%.  Patient educated on abdominal precautions.  Instructed for supine > sit transfer via logroll.  Patient already mostly laying on right side, completes sidelying to sit transfer with SBA, UE support on bed rail.  She maintains sitting balance IND.  Increased time to don second gown and manage lines, RN capping NG tube for mobility.  Patient with poor line awareness.  Patient requesting to remove O2 tubing, on room air SpO2 88-92%.  Cues for pursed lip breathing for aerobic function.  Patient SBA for sit <> stand, reporting increased abdominal pain in standing.  Patient maintain standing balance with walker and SBA, cued for upright posture.  Following ambulation in auguste, patient returning to bed but needing mod Ax1 at LEs to facilitate sit > sidelying.  Patient left supine with call light in reach and bed alarm activated.  Educated for ankle pumps and circles to promote distal fluid return for DVT prevention.  2L O2 donned as SpO2 dropping to mid 80s on room air  when bed, cues for pursed lip breathing to facilitate recovery to 92%.   Gait Training   Gait Training Minutes (03124) 16   Symptoms Noted During/After Treatment (Gait Training) increased pain   Treatment Detail/Skilled Intervention Patient ambulates 325' with FWW and SBA.  Patient initially with slow gait speed and decreased step length but with continued ambulation, reporting decreased abdominal pain and walking faster.  Patient cued for upright posture with ambulation.  Encouraged to progress to ambulating 4 times per day with staff to progress gut motility and progress activity tolerance.   Distance in Feet 15' eval + 325' treatment   PT Discharge Planning   PT Plan Bed mob w/ abd precautions, repeated sit <> stands, gait (wean walker as able, stairs   PT Discharge Recommendation (DC Rec) home with assist;home with home care physical therapy   PT Rationale for DC Rec Patient presents below her IND baseline, currently needing Ax1 for mobility given abdominal pain and precautions.  Patient lives alone and prefers to return home at discharge, states her children and neighbors could assist as needed.  Anticipate discharge home if patient progresses to IND/mod I for mobility.  Patient may benefit from use of FWW at discharge, will continue to assess.  Consider  PT to address strength, balance, and endurance deficits at discharge.   PT Brief overview of current status Ax1 FWW   PT Equipment Needed at Discharge walker, rolling   Total Session Time   Timed Code Treatment Minutes 29   Total Session Time (sum of timed and untimed services) 39      93

## 2023-11-20 NOTE — PROGRESS NOTES
[facility]    Medicine Progress Note - Hospitalist Service    Date of Admission:  11/18/2023    Primary Care Physician   Melonie Stark  CONSULTANTS: Colorectal surgery    Assessment & Plan     Rosita Ramirez is a 68 year old female with PMHx of type 2 diabetes, hypertension, hyperlipidemia,  who was admitted on 11/18/2023 with cecal volvulus. Colorectal surgery was consulted for operative management.     Cecal volvulus with cecal wall pneumatosis with Small bowel obstruction, Leukocytosis, right open colectomy (11/19)  Patient presented with 3 days of abdominal pain. Last BM Thursday 11/16. She came in after developing nausea and vomiting.  She has a history of appendectomy as a child. Labs notable for WBC of 18, normal lipase, LFTs, and BMP. Imaging in the emergency room with abdomen CT showed cecal volvulus with pneumatosis in wall of cecum, as well as obstruction with dilated distal small bowel.  She was made NPO, placed on IV fluids, and had an NG tube placed.  She was taken for surgery on 11/19/23 having a right open colectomy for sigmoid volvulus.  She did get started on antibiotics with rocephin which were stopped after surgery.  She is NPO and has an NG in until return of bowel function.  Pain controlled with dilaudid PCA, Toradol. Pathology pending from 11/19.  Hopefully Faulkner can come out today need to start getting up and about and should use incentive spirometry.  Will get physical therapy to see.  Recheck cbc in am.      Circumferential wall thickening of distal esophagus  Noted on CT. Likely inflammatory due to nausea and vomiting related to above. Differential includes neoplastic. Outpatient follow up.      Asymptomatic bacteriuria   Abnormal UA on admission with large leukocyte esterase and negative nitrite. Patient denies urinary symptoms.  urine culture showing kylie on 11/18/23     Type 2 diabetes mellitus  On metformin and semaglutide at home.    on arrival.  Has  been stable now at 113-140.  Continue on insulin correction scale.  Resume  metformin when eating.      Hypertension  Blood pressures been 109-126/35-60.  Has as needed hydralazine available.Patient's home Norvasc, lisinopril/hydrochlorothiazide has been held until she is able to take oral.  May not require medications or at least could cut down on them.     Hyperlipidemia  Patient's home Lipitor has been held.    Glaucoma - has metal ocular implants that are not MRI compatible , resume her home timolol and xalatan    Obesity  BMI 40 as of 11/19/23.  Complicates cares       4Ms:  Polypharmacy: Medications reviewed and currently appropriate  Mentation:   Capacity intact make her own medical decisions  Social support: Patient lives in a house by herself.  She is  a year ago.  Did not have a lot of nice things to say about her .  Patient's children are local.  Mobility: Does not use aids  What is importmant: To get back home      Discussed plan of care with nursing again, social work/case management      Diet: NPO for Medical/Clinical Reasons Except for: Meds, Ice Chips    DVT Prophylaxis: Heparin SQ  Faulkner Catheter: PRESENT, indication: /GI/GYN Pelvic Procedure  Lines: None     Cardiac Monitoring: None    RESTRAINTS: not indicated  Code Status: Full Code        Follow up plan: We will need outpatient follow-up for wound check with surgery to follow-up her colectomy.     This document was created using voice recognition technology.  Please excuse any typographical errors that may have occurred.  Please call with any questions.         Clinically Significant Risk Factors                  # Hypertension: Noted on problem list                   Disposition Plan     Expected Discharge Date: 11/21/2023                  Barrier to discharge: Return of bowel function, able to eat, and pain control.  Timing for discharge will be per surgery    Margarito Lewis MD  Hospitalist Service  [facility]  Hospital  Securely message with [employer] (more info)  Text page via [facility] Paging/Directory   ______________________________________________________________________    Interval History   Patient states that today she is having more abdominal pain and is tender to the touch.  When she lays still she is okay but with any movement has worsening pain.  She is on a PCA and is pushing it.  She does have a Faulkner in place which is coming out later today.  She does start getting up and about.  Goal is to get home at the time of discharge. Needs to use her incentive spirometry     ROS: A comprehensive review of systems was negative except for items noted in the HPI.  Patient currently denies any fever, chills, sweats, nausea, vomiting, diarrhea, shortness of breath, or chest pain.      Physical Exam   Vital Signs: Temp: 98.4  F (36.9  C) Temp src: Oral BP: (!) 116/35 Pulse: 93   Resp: 20 SpO2: 97 % O2 Device: Nasal cannula Oxygen Delivery: 2 LPM  Weight: 0 lbs 0 oz    Exam is stable from yesterday  General appearance: Patient is alert and oriented x3, mild distress with movement, pleasant and conversing normally, speaking in full sentences, appears older than stated age, lying up in bed  HEENT:    Mucous membranes are moist, wearing nasal cannula, NG in place  RESPIRATORY: Clear to auscultation bilateral, good air movement  CARDIOVASCULAR: Regular rate and rhythm, normal S1/S2, no murmurs, rubs, or gallops present, peripheral pulses intact  GASTROINTESTINAL: Non-distended, appropriate postop tenderness, soft, bowel sounds absent., midline surgical incision dressed with blood unchanged from yesterday  MUSCULOSKELETAL: without deformity, normal range of motion  SKIN:  Warm, dry, no rashes, no mottling  NEUROLOGIC:  Cranial nerves II-XII intact, without any focal deficits, strength 5/5 throughout  EXTREMITIES:  Moves all extremities, no clubbing, cyanosis, nor edema  :  Faulkner is present   with clear urine      Data     I have  personally reviewed the following data over the past 24 hrs:    Procal: N/A CRP: N/A Lactic Acid: 1.1         Imaging:   Results for orders placed or performed during the hospital encounter of 11/18/23   CT Abdomen Pelvis w Contrast    Narrative    EXAM: CT ABDOMEN PELVIS W CONTRAST  LOCATION: [facility]  DATE: 11/18/2023    INDICATION: Diffuse abdominal pain.  COMPARISON: None.  TECHNIQUE: CT scan of the abdomen and pelvis was performed following injection of IV contrast. Multiplanar reformats were obtained. Dose reduction techniques were used.  CONTRAST: 100mL Isovue 370    FINDINGS:   LOWER CHEST: Circumferential wall thickening of the distal esophagus. Mild scarring at the lung bases.    HEPATOBILIARY: Normal.    PANCREAS: Normal.    SPLEEN: Normal.    ADRENAL GLANDS: Normal.    KIDNEYS/BLADDER: Normal.    BOWEL: There is a cecal volvulus. The cecum is very distended with gas and fluid measuring up to 13 cm in diameter. There is pneumatosis in the wall of the cecum. Distal small bowel is mildly dilated. Distal colon is decompressed. There is   circumferential wall thickening of the distal esophagus. There is no free intraperitoneal gas or fluid.    LYMPH NODES: Normal.    VASCULATURE: Unremarkable.    PELVIC ORGANS: Normal.    MUSCULOSKELETAL: Degenerative disease in the spine.      Impression    IMPRESSION:   1.  There is a cecal volvulus. The cecum is very distended with gas and fluid and there is pneumatosis in the wall of the cecum. No portal venous gas.  2.  Distal small bowel is dilated consistent with obstruction secondary to the cecal volvulus.  3.  Circumferential wall thickening of the distal esophagus, inflammatory versus neoplastic.    Critical Result: Cecal volvulus    Finding was identified on 11/18/2023 11:42 PM CST.    Dr. Jasso was contacted by me on 11/18/2023 11:42 PM CST and verbalized understanding of the critical result.        Procedures: Open right colectomy for  sigmoid volvulus 11/19/23    I have personally have reviewed the patient's most up to date  labs, orders, and medications myself

## 2023-11-20 NOTE — PLAN OF CARE
Goal Outcome Evaluation:       Pertinent assessments: Patient alert and oriented x4. VSS, on 2L NC. Up Ax1 with walker. Pain controlled with PCA pump. LS clear. BS hypoactive. Not passing gas yet. On NG tube set to LIS. Denies nausea. PIV infusing LR @ 75 ml/hr. B, 111.  Major Shift Events Faulkner out.   Treatment Plan: PCA, NPO, awaiting for bowel function to return.   Bedside Nurse: Staci Donohue RN

## 2023-11-20 NOTE — PLAN OF CARE
Goal Outcome Evaluation:      Pertinent assessments: Assumed care of pt from [id number]. Pt A&O x4. Not oob during this time. PCA pump being utilized; pain well controlled. Complained of pain at incision site. Dressing had dried drainage. NG tube in place set to intermittent low suction. Faulkner in place draining adequate urine. On capno. PIV patent and infusing LR @ 75ml/hr. B. NPO status maintained.     Major Shift Events: none    Treatment Plan: Pain management, NPO except meds and ice, await return of bowel function, tolerate fluids/food    Bedside Nurse: Svetlana Tucker RN

## 2023-11-20 NOTE — PROGRESS NOTES
COLON & RECTAL SURGERY PROGRESS NOTE  POD# 2    S:  no acute events overnight, using PCA for pain control, NGT output recorded at 650cc, no flatus.     Vitals:  Vitals:    11/19/23 1519 11/19/23 1703 11/19/23 1756 11/19/23 2358   BP: 126/60 131/55 118/58 109/50   BP Location: Right arm      Patient Position:       Cuff Size:       Pulse: 93 94  91   Resp: 16 23 20 13   Temp: 98.7  F (37.1  C)   98.8  F (37.1  C)   TempSrc: Oral   Oral   SpO2: 96% 96% 98% 96%     I/O:  I/O last 3 completed shifts:  In: 2208 [I.V.:2208]  Out: 1130 [Urine:650; Emesis/NG output:450; Blood:30]    PE:  General Appearance: no acute distress, alert and oriented   Abdomen: soft, tender throughout with voluntary guarding, minimally distended   Ext: warm and well-perfused     Labs:  Recent Labs   Lab 11/20/23  0321 11/19/23  2233 11/19/23  1847 11/19/23  1458 11/19/23  1100 11/19/23  0639 11/19/23  0040 11/18/23 2022   WBC  --   --   --   --   --  16.4*  --  18.0*   HGB  --   --   --   --   --  12.9  --  13.1   PLT  --   --   --   --   --  399  --  426   NA  --   --   --   --   --  136  --  134*   POTASSIUM  --   --   --   --   --  3.9  --  4.0   CHLORIDE  --   --   --   --   --  100  --  95*   CO2  --   --   --   --   --  25  --  25   BUN  --   --   --   --   --  17.6  --  19.3   CR  --   --   --   --   --  0.72  --  0.94   * 132* 125* 154*   < > 164*   < > 140*   AST  --   --   --   --   --   --   --  21   ALT  --   --   --   --   --   --   --  20    < > = values in this interval not displayed.     A/P: 68 year old woman with cecal volvulus s/p open right colectomy. Awaiting return of bowel function, would maintain NGT today given 650cc over the past 24 hours and no flatus.     Would continue dPCA and IVF today. Will remove Faulkner. Needs to get out of bed and ambulate, use IS. May benefit from PT consult.     Colorectal surgery will continue to follow.     Estefania Ngo MD   Colon and Rectal Surgery Fellow     Colon and Rectal  Surgery Attending Note    Patient seen and examined independently.  Agree with above assessment and plan.  Postop day 1.2 from open right hemicolectomy for cecal volvulus.    NG tube remains with moderate dark brown output.  Denies passing gas or bowel movements.  Abdomen soft and round.  Quite tender throughout.    Plan: Continue nasogastric tube, remove Faulkner, encourage ambulation, continue PCA for now.    Hopefully NG tube output will improve soon to allow for oral intake.    Jessi Kelley MD  [facility]  [address] [place] [address], [address]  [place] [address]  T: [phone]  F: [phone]   [email or web address]

## 2023-11-20 NOTE — PLAN OF CARE
Pt A&O x4. PCA for pain- well controlled. Guarding of incision site. Dressing had dried drainage-marked. NG tube in place set to intermittent low suction ~300 out/8 hours. Faulkner with clear urine. Capno inacurate due to NGT- changed to pulse oximetry. 92-94% with 2L O2 overnight. LR @100. RBG overnight 140. NPO status maintained.     Major Shift Events:    Linen change after NGT spilled some contents, otherwise uneventful night, slept well. No significant change in condition.       Treatment Plan:   Pain management, NPO, await return of bowel function. Encourage bed & OOB mobility as able. CR following.

## 2023-11-21 ENCOUNTER — APPOINTMENT (OUTPATIENT)
Dept: PHYSICAL THERAPY | Facility: CLINIC | Age: 68
DRG: 330 | End: 2023-11-21
Payer: COMMERCIAL

## 2023-11-21 LAB
ERYTHROCYTE [DISTWIDTH] IN BLOOD BY AUTOMATED COUNT: 13.6 % (ref 10–15)
GLUCOSE BLDC GLUCOMTR-MCNC: 102 MG/DL (ref 70–99)
GLUCOSE BLDC GLUCOMTR-MCNC: 104 MG/DL (ref 70–99)
GLUCOSE BLDC GLUCOMTR-MCNC: 108 MG/DL (ref 70–99)
GLUCOSE BLDC GLUCOMTR-MCNC: 90 MG/DL (ref 70–99)
GLUCOSE BLDC GLUCOMTR-MCNC: 92 MG/DL (ref 70–99)
HCT VFR BLD AUTO: 32.8 % (ref 35–47)
HGB BLD-MCNC: 10.6 G/DL (ref 11.7–15.7)
MCH RBC QN AUTO: 28.9 PG (ref 26.5–33)
MCHC RBC AUTO-ENTMCNC: 32.3 G/DL (ref 31.5–36.5)
MCV RBC AUTO: 89 FL (ref 78–100)
PATH REPORT.COMMENTS IMP SPEC: NORMAL
PATH REPORT.COMMENTS IMP SPEC: NORMAL
PATH REPORT.FINAL DX SPEC: NORMAL
PATH REPORT.GROSS SPEC: NORMAL
PATH REPORT.MICROSCOPIC SPEC OTHER STN: NORMAL
PATH REPORT.RELEVANT HX SPEC: NORMAL
PHOTO IMAGE: NORMAL
PLATELET # BLD AUTO: 372 10E3/UL (ref 150–450)
RBC # BLD AUTO: 3.67 10E6/UL (ref 3.8–5.2)
WBC # BLD AUTO: 13.4 10E3/UL (ref 4–11)

## 2023-11-21 PROCEDURE — 85027 COMPLETE CBC AUTOMATED: CPT | Performed by: INTERNAL MEDICINE

## 2023-11-21 PROCEDURE — 99232 SBSQ HOSP IP/OBS MODERATE 35: CPT | Performed by: INTERNAL MEDICINE

## 2023-11-21 PROCEDURE — 250N000011 HC RX IP 250 OP 636: Performed by: COLON & RECTAL SURGERY

## 2023-11-21 PROCEDURE — 97530 THERAPEUTIC ACTIVITIES: CPT | Mod: GP | Performed by: PHYSICAL THERAPIST

## 2023-11-21 PROCEDURE — 120N000001 HC R&B MED SURG/OB

## 2023-11-21 PROCEDURE — 36415 COLL VENOUS BLD VENIPUNCTURE: CPT | Performed by: INTERNAL MEDICINE

## 2023-11-21 PROCEDURE — 258N000003 HC RX IP 258 OP 636: Performed by: COLON & RECTAL SURGERY

## 2023-11-21 PROCEDURE — 88307 TISSUE EXAM BY PATHOLOGIST: CPT | Mod: 26 | Performed by: PATHOLOGY

## 2023-11-21 RX ADMIN — LATANOPROST 1 DROP: 50 SOLUTION OPHTHALMIC at 21:40

## 2023-11-21 RX ADMIN — SODIUM CHLORIDE, POTASSIUM CHLORIDE, SODIUM LACTATE AND CALCIUM CHLORIDE: 600; 310; 30; 20 INJECTION, SOLUTION INTRAVENOUS at 23:38

## 2023-11-21 RX ADMIN — TIMOLOL MALEATE 1 DROP: 5 SOLUTION/ DROPS OPHTHALMIC at 08:49

## 2023-11-21 RX ADMIN — SODIUM CHLORIDE, POTASSIUM CHLORIDE, SODIUM LACTATE AND CALCIUM CHLORIDE: 600; 310; 30; 20 INJECTION, SOLUTION INTRAVENOUS at 04:02

## 2023-11-21 RX ADMIN — HEPARIN SODIUM 5000 UNITS: 5000 INJECTION, SOLUTION INTRAVENOUS; SUBCUTANEOUS at 23:37

## 2023-11-21 RX ADMIN — HEPARIN SODIUM 5000 UNITS: 5000 INJECTION, SOLUTION INTRAVENOUS; SUBCUTANEOUS at 00:35

## 2023-11-21 RX ADMIN — HEPARIN SODIUM 5000 UNITS: 5000 INJECTION, SOLUTION INTRAVENOUS; SUBCUTANEOUS at 08:53

## 2023-11-21 RX ADMIN — HEPARIN SODIUM 5000 UNITS: 5000 INJECTION, SOLUTION INTRAVENOUS; SUBCUTANEOUS at 16:49

## 2023-11-21 ASSESSMENT — ACTIVITIES OF DAILY LIVING (ADL)
ADLS_ACUITY_SCORE: 22
ADLS_ACUITY_SCORE: 22
ADLS_ACUITY_SCORE: 21
ADLS_ACUITY_SCORE: 22
ADLS_ACUITY_SCORE: 22
ADLS_ACUITY_SCORE: 21
ADLS_ACUITY_SCORE: 21
ADLS_ACUITY_SCORE: 23
ADLS_ACUITY_SCORE: 21
ADLS_ACUITY_SCORE: 22
ADLS_ACUITY_SCORE: 22
ADLS_ACUITY_SCORE: 21

## 2023-11-21 NOTE — PLAN OF CARE
Goal Outcome Evaluation:    Pertinent assessments: Pt A&O, up SBA (worked with PT),diastolic BP low, seems baseline.  Denies sob or nausea. Endorses some pain when sitting, comfortable when lying. NC at 3lpm. Pt denied PRN Senna, nervous about pain/cramping. NG to LIS, with fair output see flowsheets. Not passing gas or stool    Major Shift Events IV access lost at end of shift, paged MD requesting oral pain meds at pt request, no response at this time.    Treatment Plan: Pain management and monitor bowel function.    Bedside Nurse: Annika Garcia RN

## 2023-11-21 NOTE — PLAN OF CARE
Goal Outcome Evaluation:      Plan of Care Reviewed With: patient    Overall Patient Progress: improvingOverall Patient Progress: improving  To Do:  End of Shift Summary  For vital signs and complete assessments, please see documentation flowsheets.     Pertinent assessments: Pt A&O, up SBA diastolic BP low, seems baseline.  Denies sob or nausea. Endorses some pain when sitting, comfortable when lying. NC at 3lpm. NG to LIS, with fair output see flowsheets. Not passing gas or stool    Major Shift Events IV access lost at end of  previous shift, Pt said she will wait until morning to have it placed. @0300 requested for IV to be placed. ICU nurse placed new PIV @ 0400. LR infusing, PCA pump back in use.    Treatment Plan: Pain management and monitor bowel function.    Bedside Nurse: Darío Levy RN

## 2023-11-21 NOTE — PROGRESS NOTES
COLON & RECTAL SURGERY  PROGRESS NOTE    November 21, 2023  Post-op Day # 2    SUBJECTIVE:  Feeling better than yesterday. Pain has improved and can go a few hours without any pain. No nausea or vomiting. No BM or gas yet. Has ambulated some. NGT with 1600cc dark brown output. Patient did drink 3 small cans of sprite and a glass of water yesterday night.    OBJECTIVE:  Temp:  [98  F (36.7  C)-98.6  F (37  C)] 98  F (36.7  C)  Pulse:  [86-94] 86  Resp:  [18-20] 18  BP: (104-118)/(35-49) 118/45  SpO2:  [91 %-97 %] 96 %    Intake/Output Summary (Last 24 hours) at 11/21/2023 0833  Last data filed at 11/21/2023 0632  Gross per 24 hour   Intake 20 ml   Output 1600 ml   Net -1580 ml       GENERAL:  Awake, alert, no acute distress, lying in bed  HEAD: Nomocephalic atraumatic  SCLERA: anicteric  EXTREMITIES: warm and well perfused  ABDOMEN:  Soft, appropriately tender, round, no rebound or guarding, no peritoneal signs  INCISION:  C/d/I. Some shadowing on the bandage. Nicol intact.    LABS:  Lab Results   Component Value Date    WBC 13.4 11/21/2023     Lab Results   Component Value Date    HGB 10.6 11/21/2023     Lab Results   Component Value Date    HCT 32.8 11/21/2023     Lab Results   Component Value Date     11/21/2023     Last Basic Metabolic Panel:  Lab Results   Component Value Date     11/19/2023     06/22/2021      Lab Results   Component Value Date    POTASSIUM 3.9 11/19/2023    POTASSIUM 4.4 09/27/2022    POTASSIUM 4.2 06/22/2021     Lab Results   Component Value Date    CHLORIDE 100 11/19/2023    CHLORIDE 102 09/27/2022    CHLORIDE 105 06/22/2021     Lab Results   Component Value Date    TANI 8.7 11/19/2023    TANI 9.0 06/22/2021     Lab Results   Component Value Date    CO2 25 11/19/2023    CO2 27 09/27/2022    CO2 26 06/22/2021     Lab Results   Component Value Date    BUN 17.6 11/19/2023    BUN 17 09/27/2022    BUN 17 06/22/2021     Lab Results   Component Value Date    CR 0.72 11/19/2023     CR 0.71 06/22/2021     Lab Results   Component Value Date     11/21/2023     09/27/2022     06/22/2021       ASSESSMENT/PLAN: Rosita is a 68 year old woman who is POD#2 s/p open right colectomy due to a cecal volvulus.    - NPO/IVF  - NGT clamp trial. Clamp for 4 hours and then reattach to LIS for one hour. If output is less than 200 and there is no nausea or increased pain, NGT can be removed. If more than 200cc out, nauseous, or more pain, reattach to LIS.  - Abdominal binder for comfort  - Multimodal pain control  - Encourage ambulation  - [facility] for ppx      For questions/paging, please contact the CRS office at [phone].    Candelaria Fabian PA-C  [facility]  Phone: [phone]      Colon and Rectal Surgery Attending Note    Patient seen and examined independently.  Agree with above assessment and plan.  Patient moving around in the bed more today.  Abdominal binder providing some relief.  Nasogastric tube had 1.6 L but apparently was drinking around this.  No nausea, vomiting, fevers, or chills.    Abdomen round, softer than yesterday.  No rebound or guarding.  Mills intact without signs of infection.    Plan: Minimize p.o. intake and do a clamping trial.  If less than 200 cc after the clamping trial may remove and start gentle clear liquids.  Await bowel function  Encourage ambulation  Minimize narcotics  Hopeful for discharge on Thursday.    Jessi Kelley MD  [facility]  [address], [address]  [place] [address]  T: [phone]  F: [phone]   [email or web address]

## 2023-11-21 NOTE — PROGRESS NOTES
[facility]    Medicine Progress Note - Hospitalist Service    Date of Admission:  11/18/2023    Primary Care Physician   Melonie Stark  CONSULTANTS: Colorectal surgery    Assessment & Plan     Rosita Ramirez is a 68 year old female with PMHx of type 2 diabetes, hypertension, hyperlipidemia,  who was admitted on 11/18/2023 with cecal volvulus. Colorectal surgery was consulted for operative management.     Cecal volvulus with cecal wall pneumatosis with Small bowel obstruction, Leukocytosis, right open colectomy (11/19), postop anemia  Patient presented with 3 days of abdominal pain. Last BM Thursday 11/16. She came in after developing nausea and vomiting.  She has a history of appendectomy as a child. Labs notable for WBC of 18, normal lipase, LFTs, and BMP. Imaging in the emergency room with abdomen CT showed cecal volvulus with pneumatosis in wall of cecum, as well as obstruction with dilated distal small bowel.  She was made NPO, placed on IV fluids, and had an NG tube placed.  She was taken for surgery on 11/19/23 having a right open colectomy for sigmoid volvulus.  She did get started on antibiotics with rocephin which were stopped after surgery.  She is NPO and has an NG in until return of bowel function, no bowel movement or flatus.  Pain controlled with dilaudid PCA, Toradol. Swtich to oral pain medications when able to.  Pathology pending from 11/19 still.  Hopefully Faulkner can come out today need to start getting up and about and should use incentive spirometry.  Physical therapy to see.  Recheck cbc with improving wbc of 13.4, and hemoglobin of 10.6 down from 12.9 without out signs of active bleeding.       Circumferential wall thickening of distal esophagus  Noted on CT. Likely inflammatory due to nausea and vomiting related to above. Differential includes neoplastic. Outpatient follow up. Will need an EGD at some point.      Asymptomatic bacteriuria   Abnormal UA on admission  with large leukocyte esterase and negative nitrite. Patient denies urinary symptoms.  urine culture showing kylie on 11/18/23     Type 2 diabetes mellitus  On metformin and semaglutide at home.    on arrival.  Has been stable now at .  Continue on insulin correction scale.  Resume  metformin when eating normally     Hypertension  Blood pressures been 104-118/35-60.  Has as needed hydralazine available. Patient's home Norvasc, lisinopril/hydrochlorothiazide has been held until she is able to take oral.  May not require medications or at least could cut down on them.     Hyperlipidemia  Patient's home Lipitor has been held.    Glaucoma - has metal ocular implants that are not MRI compatible , resume her home timolol and xalatan    Obesity  BMI 40 as of 11/19/23.  Complicates cares       4Ms:  Polypharmacy: Medications reviewed and currently appropriate  Mentation:   Capacity intact make her own medical decisions  Social support: Patient lives in a house by herself.  She is  a year ago.  Did not have a lot of nice things to say about her .  Patient's children are local.  Mobility: Does not use aids  What is importmant: To get back home at discharge      Discussed plan of care with nursing again, social work/case management      Diet: NPO for Medical/Clinical Reasons Except for: Meds, Ice Chips    DVT Prophylaxis: Heparin SQ  Faulkner Catheter: Not present  Lines: None     Cardiac Monitoring: None    RESTRAINTS: not indicated  Code Status: Full Code        Follow up plan: We will need outpatient follow-up for wound check with surgery to follow-up her colectomy. Needs an EGD set up with gastroenterology as above.      This document was created using voice recognition technology.  Please excuse any typographical errors that may have occurred.  Please call with any questions.         Clinically Significant Risk Factors                  # Hypertension: Noted on problem list                    Disposition Plan     Expected Discharge Date: 11/21/2023                  Barrier to discharge: Return of bowel function, able to eat, and pain control on orals.   Timing for discharge will be per surgery, still on PCA and awaiting return of bowel functoin    Margarito Lewis MD  Hospitalist Service  [facility]  Securely message with [employer] (more info)  Text page via [facility] Paging/Directory   ______________________________________________________________________    Interval History   Patient had a rough night due to pain.  She had she lost an IV and then a hard time getting it back in.  Once in her PCA was resumed and her pain is under much better control.  Patient had her Faulkner removed.  Was up to a chair yesterday.  Denies any flatus or bowel movements.  States that she has been up. Has been using incentive spirometry     ROS: A comprehensive review of systems was negative except for items noted in the HPI.  Patient currently denies any fever, chills, sweats, nausea, vomiting, diarrhea, shortness of breath, or chest pain.      Physical Exam   Vital Signs: Temp: 98  F (36.7  C) Temp src: Oral BP: 118/45 Pulse: 86   Resp: 18 SpO2: 96 % O2 Device: Nasal cannula Oxygen Delivery: 3 LPM  Weight: 0 lbs 0 oz    Exam is stable from yesterday  General appearance: Patient is alert and oriented x3, mild distress with movement, pleasant and conversing normally, speaking in full sentences, appears older than stated age, sitting up in bed  HEENT:    Mucous membranes are moist, wearing nasal cannula, NG in place with dark output   RESPIRATORY: Clear to auscultation bilateral, good air movement  CARDIOVASCULAR: Regular rate and rhythm, normal S1/S2, no murmurs, rubs, or gallops present, peripheral pulses intact  GASTROINTESTINAL: Non-distended, appropriate postop tenderness, soft, bowel sounds absent still, midline surgical incision dressed with blood unchanged from yesterday  MUSCULOSKELETAL: without  deformity, normal range of motion  SKIN:  Warm, dry, no rashes, no mottling  NEUROLOGIC:  Cranial nerves II-XII intact, without any focal deficits, strength 5/5 throughout  EXTREMITIES:  Moves all extremities, no clubbing, cyanosis, nor edema  :  Faulkner has been removed      Data     I have personally reviewed the following data over the past 24 hrs:    13.4 (H)  \   10.6 (L)   / 372     N/A N/A N/A /  102 (H)   N/A N/A N/A \       Imaging:   Results for orders placed or performed during the hospital encounter of 11/18/23   CT Abdomen Pelvis w Contrast    Narrative    EXAM: CT ABDOMEN PELVIS W CONTRAST  LOCATION: [facility]  DATE: 11/18/2023    INDICATION: Diffuse abdominal pain.  COMPARISON: None.  TECHNIQUE: CT scan of the abdomen and pelvis was performed following injection of IV contrast. Multiplanar reformats were obtained. Dose reduction techniques were used.  CONTRAST: 100mL Isovue 370    FINDINGS:   LOWER CHEST: Circumferential wall thickening of the distal esophagus. Mild scarring at the lung bases.    HEPATOBILIARY: Normal.    PANCREAS: Normal.    SPLEEN: Normal.    ADRENAL GLANDS: Normal.    KIDNEYS/BLADDER: Normal.    BOWEL: There is a cecal volvulus. The cecum is very distended with gas and fluid measuring up to 13 cm in diameter. There is pneumatosis in the wall of the cecum. Distal small bowel is mildly dilated. Distal colon is decompressed. There is   circumferential wall thickening of the distal esophagus. There is no free intraperitoneal gas or fluid.    LYMPH NODES: Normal.    VASCULATURE: Unremarkable.    PELVIC ORGANS: Normal.    MUSCULOSKELETAL: Degenerative disease in the spine.      Impression    IMPRESSION:   1.  There is a cecal volvulus. The cecum is very distended with gas and fluid and there is pneumatosis in the wall of the cecum. No portal venous gas.  2.  Distal small bowel is dilated consistent with obstruction secondary to the cecal volvulus.  3.   Circumferential wall thickening of the distal esophagus, inflammatory versus neoplastic.    Critical Result: Cecal volvulus    Finding was identified on 11/18/2023 11:42 PM CST.    Dr. Jasso was contacted by me on 11/18/2023 11:42 PM CST and verbalized understanding of the critical result.        Procedures: Open right colectomy for sigmoid volvulus 11/19/23    I have personally have reviewed the patient's most up to date  labs, orders, and medications myself

## 2023-11-22 ENCOUNTER — APPOINTMENT (OUTPATIENT)
Dept: PHYSICAL THERAPY | Facility: CLINIC | Age: 68
DRG: 330 | End: 2023-11-22
Payer: COMMERCIAL

## 2023-11-22 LAB
GLUCOSE BLDC GLUCOMTR-MCNC: 103 MG/DL (ref 70–99)
GLUCOSE BLDC GLUCOMTR-MCNC: 112 MG/DL (ref 70–99)
GLUCOSE BLDC GLUCOMTR-MCNC: 116 MG/DL (ref 70–99)
GLUCOSE BLDC GLUCOMTR-MCNC: 91 MG/DL (ref 70–99)
GLUCOSE BLDC GLUCOMTR-MCNC: 98 MG/DL (ref 70–99)
GLUCOSE BLDC GLUCOMTR-MCNC: 98 MG/DL (ref 70–99)
PLATELET # BLD AUTO: 389 10E3/UL (ref 150–450)

## 2023-11-22 PROCEDURE — 97116 GAIT TRAINING THERAPY: CPT | Mod: GP

## 2023-11-22 PROCEDURE — 36415 COLL VENOUS BLD VENIPUNCTURE: CPT | Performed by: COLON & RECTAL SURGERY

## 2023-11-22 PROCEDURE — 85049 AUTOMATED PLATELET COUNT: CPT | Performed by: COLON & RECTAL SURGERY

## 2023-11-22 PROCEDURE — 97530 THERAPEUTIC ACTIVITIES: CPT | Mod: GP

## 2023-11-22 PROCEDURE — 250N000011 HC RX IP 250 OP 636: Performed by: COLON & RECTAL SURGERY

## 2023-11-22 PROCEDURE — 99232 SBSQ HOSP IP/OBS MODERATE 35: CPT | Performed by: INTERNAL MEDICINE

## 2023-11-22 PROCEDURE — 250N000013 HC RX MED GY IP 250 OP 250 PS 637: Performed by: STUDENT IN AN ORGANIZED HEALTH CARE EDUCATION/TRAINING PROGRAM

## 2023-11-22 PROCEDURE — 999N000127 HC STATISTIC PERIPHERAL IV START W US GUIDANCE

## 2023-11-22 PROCEDURE — 120N000001 HC R&B MED SURG/OB

## 2023-11-22 PROCEDURE — 258N000003 HC RX IP 258 OP 636: Performed by: COLON & RECTAL SURGERY

## 2023-11-22 RX ADMIN — HEPARIN SODIUM 5000 UNITS: 5000 INJECTION, SOLUTION INTRAVENOUS; SUBCUTANEOUS at 16:20

## 2023-11-22 RX ADMIN — HEPARIN SODIUM 5000 UNITS: 5000 INJECTION, SOLUTION INTRAVENOUS; SUBCUTANEOUS at 08:18

## 2023-11-22 RX ADMIN — TIMOLOL MALEATE 1 DROP: 5 SOLUTION/ DROPS OPHTHALMIC at 08:19

## 2023-11-22 RX ADMIN — Medication: at 20:44

## 2023-11-22 RX ADMIN — CALCIUM CARBONATE (ANTACID) CHEW TAB 500 MG 1000 MG: 500 CHEW TAB at 20:29

## 2023-11-22 RX ADMIN — HEPARIN SODIUM 5000 UNITS: 5000 INJECTION, SOLUTION INTRAVENOUS; SUBCUTANEOUS at 23:41

## 2023-11-22 RX ADMIN — SODIUM CHLORIDE, POTASSIUM CHLORIDE, SODIUM LACTATE AND CALCIUM CHLORIDE: 600; 310; 30; 20 INJECTION, SOLUTION INTRAVENOUS at 18:17

## 2023-11-22 RX ADMIN — LATANOPROST 1 DROP: 50 SOLUTION OPHTHALMIC at 20:30

## 2023-11-22 ASSESSMENT — ACTIVITIES OF DAILY LIVING (ADL)
ADLS_ACUITY_SCORE: 22

## 2023-11-22 NOTE — PLAN OF CARE
End of Shift Summary  For vital signs and complete assessments, please see documentation flowsheets.     Pertinent assessments: Pt A/O. O2 91% on RA. Desats whens sleeping. PCA Dilaudid, abdo pain well controlled. Denies nausea and SOB. BS hypo, not passing flatus and BM. Midline incision LUIS F, stapes in place, Up SBA.  Major Shift Events None  Treatment Plan: Symptom management, await bowel return and diet advancement  Bedside Nurse: Iris Ruiz RN

## 2023-11-22 NOTE — PLAN OF CARE
Goal Outcome Evaluation:       To Do:  End of Shift Summary  For vital signs and complete assessments, please see documentation flowsheets.     Pertinent assessments: Pt A/O. O2 2L NC. Desats whens sleeping. PCA Dilaudid, abdo pain well controlled. Denies nausea and SOB. BS hypo, not passing flatus and BM. Midline incision LUIS F, stapes in place, Up SBA. BG 90,104  Major Shift Events None  Treatment Plan: Symptom management, await bowel return and diet advancement  Bedside Nurse: Danii Porter RN

## 2023-11-22 NOTE — PROGRESS NOTES
COLON & RECTAL SURGERY  PROGRESS NOTE    November 22, 2023  Post-op Day # 3    SUBJECTIVE: Patient doing well. Tolerating clears without nausea and vomiting. Still no BM or gas yet.     OBJECTIVE:  Temp:  [97.8  F (36.6  C)-99.3  F (37.4  C)] 98.6  F (37  C)  Pulse:  [85-89] 85  Resp:  [17-20] 20  BP: (114-128)/(37-53) 114/37  SpO2:  [92 %-96 %] 95 %    Intake/Output Summary (Last 24 hours) at 11/22/2023 0851  Last data filed at 11/22/2023 0601  Gross per 24 hour   Intake 3013 ml   Output 1100 ml   Net 1913 ml       GENERAL:  Awake, alert, no acute distress, lying in bed  HEAD: Nomocephalic atraumatic  SCLERA: anicteric  EXTREMITIES: warm and well perfused  ABDOMEN:  Soft, appropriately tender, round, no rebound or guarding, no peritoneal signs  INCISION:  C/d/I, staples intact    LABS:  Lab Results   Component Value Date    WBC 13.4 11/21/2023     Lab Results   Component Value Date    HGB 10.6 11/21/2023     Lab Results   Component Value Date    HCT 32.8 11/21/2023     Lab Results   Component Value Date     11/22/2023     Last Basic Metabolic Panel:  Lab Results   Component Value Date     11/19/2023     06/22/2021      Lab Results   Component Value Date    POTASSIUM 3.9 11/19/2023    POTASSIUM 4.4 09/27/2022    POTASSIUM 4.2 06/22/2021     Lab Results   Component Value Date    CHLORIDE 100 11/19/2023    CHLORIDE 102 09/27/2022    CHLORIDE 105 06/22/2021     Lab Results   Component Value Date    TANI 8.7 11/19/2023    TANI 9.0 06/22/2021     Lab Results   Component Value Date    CO2 25 11/19/2023    CO2 27 09/27/2022    CO2 26 06/22/2021     Lab Results   Component Value Date    BUN 17.6 11/19/2023    BUN 17 09/27/2022    BUN 17 06/22/2021     Lab Results   Component Value Date    CR 0.72 11/19/2023    CR 0.71 06/22/2021     Lab Results   Component Value Date    GLC 98 11/22/2023     09/27/2022     06/22/2021       ASSESSMENT/PLAN: Rosita is a 68 year old woman who is POD#3 s/p open  right colectomy due to a cecal volvulus.     - Clear liquids. AROBF.   - Abdominal binder for comfort  - Multimodal pain control, minimize narcotics.  - Encourage ambulation  - Lovenox for ppx, does not need at discharge  - Will need clinic visit to have staples removed in about 2 weeks    For questions/paging, please contact the CRS office at [phone].    Candelaria Fabian PA-C  [facility]  Phone: [phone]    Colon and Rectal Surgery Attending Note    Patient seen and examined independently.  Agree with above assessment and plan.  Postoperative day #3 from open right hemicolectomy for cecal.  No nausea since NG tube removed.  No BM or flatus yet.  Pain managed with intermittent IV Dilaudid.  Up walking some.  Good urine output per patient, poorly recorded.    Abdomen: Soft, round, tender to palpation although less so.  Incision clean dry and intact with staples.    Plan: Stick with clear liquids today.  Abdominal binder for comfort.  Minimize narcotics as possible.  Encourage mobility.  Lovenox now but will not need at discharge given benign pathology.    Jessi Kelley MD  [facility]  [address], [address]  [place] [address]  T: [phone]  F: [phone]   [email or web address]

## 2023-11-22 NOTE — PROGRESS NOTES
[facility]  Hospitalist Progress Note  Mika Patten MD 11/22/2023    Reason for Stay (Diagnosis): Cecal volvulus, status post partial colectomy         Assessment and Plan:      Summary of Stay: Rosita Ramirez is a 68 year old female with PMHx of type 2 diabetes, hypertension, hyperlipidemia,  who was admitted on 11/18/2023 with cecal volvulus. Colorectal surgery was consulted for operative management.    Patient has been slowly improving since surgery performed.  Nasogastric tube was removed on 11/21/2023 and she is on a liquid diet.  Remains on PCA for pain management    Plans today:  -I reviewed the medical chart including notes, labs, and imaging studies  -Encouraged mobilization  -Postoperative pain management per colorectal surgery.  Remains on PCA.  -No significant changes made today     Cecal volvulus with cecal wall pneumatosis with Small bowel obstruction, Leukocytosis, right open colectomy (11/19), postop anemia  - CT showed cecal volvulus with pneumatosis in wall of cecum, as well as obstruction with dilated distal small bowel.    - She was taken for surgery on 11/19/23 having a right open colectomy for sigmoid volvulus.    - She did get started on antibiotics with rocephin which were stopped after surgery.    - Pain controlled with dilaudid PCA     Circumferential wall thickening of distal esophagus  Noted on CT. Likely inflammatory due to nausea and vomiting related to above. Differential includes neoplastic. Outpatient follow up. Will need an EGD at some point.        Type 2 diabetes mellitus  On metformin and semaglutide at home..  Continue on insulin correction scale.  Resume  metformin when eating normally     Hypertension  - Patient's home Norvasc, lisinopril/hydrochlorothiazide has been held until she is able to take oral.      Hyperlipidemia  Patient's home Lipitor has been held.     Glaucoma - has metal ocular implants that are not MRI compatible , resume her home  timolol and xalatan     Obesity  BMI 40 as of 11/19/23.  Complicates cares       DVT Prophylaxis: Heparin SQ  Faulkner Catheter: Not present  Lines: None     Cardiac Monitoring: None     RESTRAINTS: not indicated  Code Status: Full Code     DISPO: Anticipate home when patient adequately mobilizing, pain controlled with oral medication, and has diet advancement with adequate caloric intake.        Interval History (Subjective):      Nasogastric tube removed yesterday.  On liquid diet.  Remains on PCA for pain management.  Encouraged ambulation with patient today                  Physical Exam:      Last Vital Signs:  BP (!) 134/34 (BP Location: Left arm)   Pulse 82   Temp 99.3  F (37.4  C) (Oral)   Resp 18   SpO2 90%       Intake/Output Summary (Last 24 hours) at 11/22/2023 1216  Last data filed at 11/22/2023 0601  Gross per 24 hour   Intake 2313 ml   Output 100 ml   Net 2213 ml       Constitutional: Awake, alert, cooperative, no apparent distress     Respiratory: Clear to auscultation bilaterally, no crackles or wheezing   Cardiovascular: Regular rate and rhythm, normal S1 and S2, and no murmur noted   Abdomen: hypoactive bowel sounds, soft, non-distended, non-tender   Skin: No rashes, no cyanosis, dry to touch   Neuro: Alert and oriented x3, no weakness, numbness, memory loss   Extremities: No edema, normal range of motion   Other(s):        All other systems: Negative          Medications:      All current medications were reviewed with changes reflected in problem list.         Data:      All new lab and imaging data was reviewed.   Labs:  Recent Labs   Lab 11/22/23  0647 11/21/23  0635   WBC  --  13.4*   HGB  --  10.6*   HCT  --  32.8*   MCV  --  89    372      Imaging:   No results found for this or any previous visit (from the past 24 hour(s)).

## 2023-11-22 NOTE — PLAN OF CARE
Pertinent assessments: pt is a/o.  Passed NG clamping removed NGT pt put on a clear liq diet pt is carmen well. BS hypo no flatus noted.   OOB to BR SBA gait is steady.  On the dilaudid PCA pt states pain is relieved.  On O2 2.5L SaO2 dropped to 85% on RA.    Major Shift Events NG removed.    Treatment Plan: Pain management and monitor bowel function.    Bedside Nurse: Shamika De Paz RN

## 2023-11-23 LAB
GLUCOSE BLDC GLUCOMTR-MCNC: 110 MG/DL (ref 70–99)
GLUCOSE BLDC GLUCOMTR-MCNC: 112 MG/DL (ref 70–99)
GLUCOSE BLDC GLUCOMTR-MCNC: 119 MG/DL (ref 70–99)
GLUCOSE BLDC GLUCOMTR-MCNC: 95 MG/DL (ref 70–99)
GLUCOSE BLDC GLUCOMTR-MCNC: 97 MG/DL (ref 70–99)

## 2023-11-23 PROCEDURE — 250N000013 HC RX MED GY IP 250 OP 250 PS 637: Performed by: STUDENT IN AN ORGANIZED HEALTH CARE EDUCATION/TRAINING PROGRAM

## 2023-11-23 PROCEDURE — 99232 SBSQ HOSP IP/OBS MODERATE 35: CPT | Performed by: INTERNAL MEDICINE

## 2023-11-23 PROCEDURE — 250N000011 HC RX IP 250 OP 636: Performed by: COLON & RECTAL SURGERY

## 2023-11-23 PROCEDURE — 258N000003 HC RX IP 258 OP 636: Performed by: COLON & RECTAL SURGERY

## 2023-11-23 PROCEDURE — 120N000001 HC R&B MED SURG/OB

## 2023-11-23 RX ADMIN — TIMOLOL MALEATE 1 DROP: 5 SOLUTION/ DROPS OPHTHALMIC at 20:34

## 2023-11-23 RX ADMIN — HEPARIN SODIUM 5000 UNITS: 5000 INJECTION, SOLUTION INTRAVENOUS; SUBCUTANEOUS at 08:30

## 2023-11-23 RX ADMIN — HEPARIN SODIUM 5000 UNITS: 5000 INJECTION, SOLUTION INTRAVENOUS; SUBCUTANEOUS at 16:15

## 2023-11-23 RX ADMIN — SODIUM CHLORIDE, POTASSIUM CHLORIDE, SODIUM LACTATE AND CALCIUM CHLORIDE: 600; 310; 30; 20 INJECTION, SOLUTION INTRAVENOUS at 18:25

## 2023-11-23 RX ADMIN — CALCIUM CARBONATE (ANTACID) CHEW TAB 500 MG 1000 MG: 500 CHEW TAB at 20:39

## 2023-11-23 RX ADMIN — TIMOLOL MALEATE 1 DROP: 5 SOLUTION/ DROPS OPHTHALMIC at 08:33

## 2023-11-23 RX ADMIN — SODIUM CHLORIDE, POTASSIUM CHLORIDE, SODIUM LACTATE AND CALCIUM CHLORIDE: 600; 310; 30; 20 INJECTION, SOLUTION INTRAVENOUS at 05:40

## 2023-11-23 ASSESSMENT — ACTIVITIES OF DAILY LIVING (ADL)
ADLS_ACUITY_SCORE: 22

## 2023-11-23 NOTE — PLAN OF CARE
Goal Outcome Evaluation:         To Do:  End of Shift Summary  For vital signs and complete assessments, please see documentation flowsheets.     Pertinent assessments: A&O---up in room and halls -- PCA with good pain relief ---tolerating full liquids denies nausea -- states starting to pass flatus and is having rumbling in her stomach ---  Major Shift Events  resting comfortable   Treatment Plan:  monitor  Bedside Nurse: Selene Hardy RN

## 2023-11-23 NOTE — PROGRESS NOTES
Colon and Rectal Surgery  Daily Progress Note    POD 5    Subjective  Patient reports doing well. Tolerated CLD. Ambulating in room. Wants to know if she can shower.    Objective  Intake/Output last 24 hrs:    Intake/Output Summary (Last 24 hours) at 11/23/2023 1229  Last data filed at 11/23/2023 0541  Gross per 24 hour   Intake 1344 ml   Output --   Net 1344 ml     Temp:  [98.6  F (37  C)-99.2  F (37.3  C)] 98.8  F (37.1  C)  Pulse:  [75-87] 75  Resp:  [18-20] 20  BP: (111-130)/(41-57) 119/50  SpO2:  [91 %-95 %] 95 %    Physical Exam:  General: awake, alert, sitting in chair, in no acute distress  Respiratory: non-labored breathing  Abdomen: soft, appropriately tender, non-distended              Incisions: clean, dry and intact with staples in place  Musculoskeletal: MORALES      Pertinent Labs  Lab Results: personally reviewed. Glucose acceptable    Assessment/Plan: This is a 68 year old female POD # 5 s/p right colectomy for cecal volvulus. NGT out and tolerated CLD.    Ok for FLD today.   Anticipate LFD tomorrow.  Continue PCA today given issues with nausea; if tolerated diet, will plan to transition off tomorrow, likely with PO Dilaudid given nausea with oxycodone.   OOB x 3, walk in halls  Ok to shower with incision uncovered    For questions/paging, please contact the CRS office at [phone].    Xochitl Beckham MD  Colon and Rectal Surgery    [facility]  [address]  [place] [address]  T: [phone]  F: [phone]

## 2023-11-23 NOTE — PROGRESS NOTES
[facility]  Hospitalist Progress Note  Mika Patten MD 11/23/2023    Reason for Stay (Diagnosis): cecal volvulus, s/p operative management         Assessment and Plan:      Summary of Stay: Rosita Ramirez is a 68 year old female with PMHx of type 2 diabetes, hypertension, hyperlipidemia,  who was admitted on 11/18/2023 with cecal volvulus. Colorectal surgery was consulted for operative management.     Patient has been slowly improving since surgery performed.  Nasogastric tube was removed on 11/21/2023 and she is on a liquid diet.  Remains on PCA for pain management     Plans today:  -I reviewed the medical chart including notes, labs, and imaging studies  -Encourage mobilization  -Postoperative pain management per colorectal surgery.  Remains on PCA.  -No significant changes made today     Cecal volvulus with cecal wall pneumatosis with Small bowel obstruction, Leukocytosis, right open colectomy (11/19), postop anemia  - CT showed cecal volvulus with pneumatosis in wall of cecum, as well as obstruction with dilated distal small bowel.    - She was taken for surgery on 11/19/23 having a right open colectomy for sigmoid volvulus.    - She did get started on antibiotics with rocephin which were stopped after surgery.    - Pain controlled with dilaudid PCA     Circumferential wall thickening of distal esophagus  Noted on CT. Likely inflammatory due to nausea and vomiting related to above. Differential includes neoplastic. Outpatient follow up. Will need an EGD at some point.         Type 2 diabetes mellitus  On metformin and semaglutide at home..  Continue on insulin correction scale.  Resume  metformin when eating normally     Hypertension  - Patient's home Norvasc, lisinopril/hydrochlorothiazide has been held until she is able to take oral.      Hyperlipidemia  Patient's home Lipitor has been held.     Glaucoma - has metal ocular implants that are not MRI compatible , resume her home  timolol and xalatan     Obesity  BMI 40 as of 11/19/23.  Complicates cares        DVT Prophylaxis: Heparin SQ  Faulkner Catheter: Not present  Lines: None     Cardiac Monitoring: None     RESTRAINTS: not indicated  Code Status: Full Code     DISPO: Anticipate home when OK with CRS service and patient adequately mobilizing, pain controlled with oral medication, and has diet advancement with adequate caloric intake.          Interval History (Subjective):      Patient wants to advance diet beyond clear liquids.  Hoping to take a shower today.  Remains on PCA for pain management.  Reports she was mobilizing yesterday                  Physical Exam:      Last Vital Signs:  /52 (BP Location: Left arm)   Pulse 78   Temp 98.6  F (37  C) (Oral)   Resp 20   SpO2 92%       Intake/Output Summary (Last 24 hours) at 11/23/2023 1017  Last data filed at 11/23/2023 0541  Gross per 24 hour   Intake 1344 ml   Output --   Net 1344 ml       Constitutional: Awake, alert, cooperative, no apparent distress     Respiratory: Clear to auscultation bilaterally, no crackles or wheezing   Cardiovascular: Regular rate and rhythm, normal S1 and S2, and no murmur noted   Abdomen: Normal bowel sounds, soft, non-distended, non-tender   Skin: No rashes, no cyanosis, dry to touch   Neuro: Alert and oriented x3, no weakness, numbness, memory loss   Extremities: No edema, normal range of motion   Other(s):        All other systems: Negative          Medications:      All current medications were reviewed with changes reflected in problem list.         Data:      All new lab and imaging data was reviewed.   Labs:  Recent Labs   Lab 11/22/23  0647 11/21/23  0635   WBC  --  13.4*   HGB  --  10.6*   HCT  --  32.8*   MCV  --  89    372      Imaging:   No results found for this or any previous visit (from the past 24 hour(s)).

## 2023-11-24 ENCOUNTER — APPOINTMENT (OUTPATIENT)
Dept: PHYSICAL THERAPY | Facility: CLINIC | Age: 68
DRG: 330 | End: 2023-11-24
Payer: COMMERCIAL

## 2023-11-24 LAB
GLUCOSE BLDC GLUCOMTR-MCNC: 100 MG/DL (ref 70–99)
GLUCOSE BLDC GLUCOMTR-MCNC: 107 MG/DL (ref 70–99)
GLUCOSE BLDC GLUCOMTR-MCNC: 113 MG/DL (ref 70–99)
GLUCOSE BLDC GLUCOMTR-MCNC: 113 MG/DL (ref 70–99)
GLUCOSE BLDC GLUCOMTR-MCNC: 141 MG/DL (ref 70–99)
GLUCOSE BLDC GLUCOMTR-MCNC: 149 MG/DL (ref 70–99)

## 2023-11-24 PROCEDURE — 97530 THERAPEUTIC ACTIVITIES: CPT | Mod: GP

## 2023-11-24 PROCEDURE — 258N000003 HC RX IP 258 OP 636: Performed by: COLON & RECTAL SURGERY

## 2023-11-24 PROCEDURE — 99232 SBSQ HOSP IP/OBS MODERATE 35: CPT | Performed by: INTERNAL MEDICINE

## 2023-11-24 PROCEDURE — 250N000011 HC RX IP 250 OP 636: Performed by: COLON & RECTAL SURGERY

## 2023-11-24 PROCEDURE — 250N000013 HC RX MED GY IP 250 OP 250 PS 637: Performed by: STUDENT IN AN ORGANIZED HEALTH CARE EDUCATION/TRAINING PROGRAM

## 2023-11-24 PROCEDURE — 250N000013 HC RX MED GY IP 250 OP 250 PS 637: Performed by: INTERNAL MEDICINE

## 2023-11-24 PROCEDURE — 97116 GAIT TRAINING THERAPY: CPT | Mod: GP

## 2023-11-24 PROCEDURE — 120N000001 HC R&B MED SURG/OB

## 2023-11-24 RX ORDER — OXYCODONE HYDROCHLORIDE 5 MG/1
5-10 TABLET ORAL EVERY 4 HOURS PRN
Status: DISCONTINUED | OUTPATIENT
Start: 2023-11-24 | End: 2023-11-24

## 2023-11-24 RX ORDER — HYDROMORPHONE HYDROCHLORIDE 2 MG/1
2-4 TABLET ORAL EVERY 4 HOURS PRN
Status: DISCONTINUED | OUTPATIENT
Start: 2023-11-24 | End: 2023-11-25 | Stop reason: HOSPADM

## 2023-11-24 RX ORDER — ACETAMINOPHEN 325 MG/1
650 TABLET ORAL EVERY 4 HOURS PRN
Status: DISCONTINUED | OUTPATIENT
Start: 2023-11-24 | End: 2023-11-25 | Stop reason: HOSPADM

## 2023-11-24 RX ORDER — ACETAMINOPHEN 650 MG/1
650 SUPPOSITORY RECTAL EVERY 4 HOURS PRN
Status: DISCONTINUED | OUTPATIENT
Start: 2023-11-24 | End: 2023-11-25 | Stop reason: HOSPADM

## 2023-11-24 RX ORDER — DEXTROSE MONOHYDRATE 25 G/50ML
25-50 INJECTION, SOLUTION INTRAVENOUS
Status: DISCONTINUED | OUTPATIENT
Start: 2023-11-24 | End: 2023-11-24

## 2023-11-24 RX ORDER — HYDROMORPHONE HCL IN WATER/PF 6 MG/30 ML
0.2 PATIENT CONTROLLED ANALGESIA SYRINGE INTRAVENOUS
Status: DISCONTINUED | OUTPATIENT
Start: 2023-11-24 | End: 2023-11-25 | Stop reason: HOSPADM

## 2023-11-24 RX ORDER — NICOTINE POLACRILEX 4 MG
15-30 LOZENGE BUCCAL
Status: DISCONTINUED | OUTPATIENT
Start: 2023-11-24 | End: 2023-11-24

## 2023-11-24 RX ORDER — HYDROMORPHONE HYDROCHLORIDE 2 MG/1
2 TABLET ORAL
Status: DISCONTINUED | OUTPATIENT
Start: 2023-11-24 | End: 2023-11-24

## 2023-11-24 RX ADMIN — HEPARIN SODIUM 5000 UNITS: 5000 INJECTION, SOLUTION INTRAVENOUS; SUBCUTANEOUS at 15:03

## 2023-11-24 RX ADMIN — ACETAMINOPHEN 650 MG: 325 TABLET, FILM COATED ORAL at 08:56

## 2023-11-24 RX ADMIN — HEPARIN SODIUM 5000 UNITS: 5000 INJECTION, SOLUTION INTRAVENOUS; SUBCUTANEOUS at 00:09

## 2023-11-24 RX ADMIN — TIMOLOL MALEATE 1 DROP: 5 SOLUTION/ DROPS OPHTHALMIC at 08:56

## 2023-11-24 RX ADMIN — HEPARIN SODIUM 5000 UNITS: 5000 INJECTION, SOLUTION INTRAVENOUS; SUBCUTANEOUS at 08:56

## 2023-11-24 RX ADMIN — HYDROMORPHONE HYDROCHLORIDE 2 MG: 2 TABLET ORAL at 15:03

## 2023-11-24 RX ADMIN — HYDROMORPHONE HYDROCHLORIDE 2 MG: 2 TABLET ORAL at 19:54

## 2023-11-24 RX ADMIN — LATANOPROST 1 DROP: 50 SOLUTION OPHTHALMIC at 19:54

## 2023-11-24 RX ADMIN — HYDROMORPHONE HYDROCHLORIDE 2 MG: 2 TABLET ORAL at 09:24

## 2023-11-24 RX ADMIN — SODIUM CHLORIDE, POTASSIUM CHLORIDE, SODIUM LACTATE AND CALCIUM CHLORIDE: 600; 310; 30; 20 INJECTION, SOLUTION INTRAVENOUS at 06:16

## 2023-11-24 ASSESSMENT — ACTIVITIES OF DAILY LIVING (ADL)
ADLS_ACUITY_SCORE: 22
ADLS_ACUITY_SCORE: 20
ADLS_ACUITY_SCORE: 22
ADLS_ACUITY_SCORE: 20
ADLS_ACUITY_SCORE: 22

## 2023-11-24 NOTE — PROGRESS NOTES
[facility]  Hospitalist Progress Note  Mika Patten MD 11/24/2023    Reason for Stay (Diagnosis): cecal volvulus s/p operative repair         Assessment and Plan:      Summary of Stay: Rosita Ramirez is a  68 year old female with PMHx of type 2 diabetes, hypertension, hyperlipidemia,  who was admitted on 11/18/2023 with cecal volvulus. Colorectal surgery was consulted for operative management.     Patient has been slowly improving since surgery performed.  Nasogastric tube was removed on 11/21/2023 and her diet has been advanced to low fiber.  PCA discontinued 11/24     Plans today:  - continue to mobilize  - diet being advanced and PCA stopped by CRS today  -will change prn oxycodone to prn PO dilaudid given her intolerance to oxycodone in the past  - will change insulin sliding scale from NPO scale to scale with meals     Cecal volvulus with cecal wall pneumatosis with Small bowel obstruction, Leukocytosis, right open colectomy (11/19), postop anemia  - CT showed cecal volvulus with pneumatosis in wall of cecum, as well as obstruction with dilated distal small bowel.    - She was taken for surgery on 11/19/23 having a right open colectomy for sigmoid volvulus.    - She did get started on antibiotics with rocephin which were stopped after surgery.    - diet has been resumed  - transitioned from PCA to PO pain meds on 11/24     Circumferential wall thickening of distal esophagus  Noted on CT. Likely inflammatory due to nausea and vomiting related to above. Differential includes neoplastic. Outpatient follow up. Will need an EGD at some point.         Type 2 diabetes mellitus  On metformin and semaglutide at home..  Continue on insulin correction scale.  Resume  metformin when eating normally     Hypertension  - Patient's home Norvasc, lisinopril/hydrochlorothiazide has been held until she is able to take oral.      Hyperlipidemia  Patient's home Lipitor has been held.     Glaucoma - has  metal ocular implants that are not MRI compatible , resume her home timolol and xalatan     Obesity  BMI 40 as of 11/19/23.  Complicates cares        DVT Prophylaxis: Heparin SQ  Faulkner Catheter: Not present  Lines: None     Cardiac Monitoring: None     RESTRAINTS: not indicated  Code Status: Full Code     DISPO: Anticipate home when OK with CRS service and patient adequately mobilizing, pain controlled with oral medication, and has diet advancement with adequate caloric intake.        Interval History (Subjective):      Transitioning off PCA today.  Had some increased pain this AM which she attributes to not using PCA overnight                  Physical Exam:      Last Vital Signs:  /52 (BP Location: Left arm)   Pulse 73   Temp 99.1  F (37.3  C) (Oral)   Resp 20   SpO2 93%       Intake/Output Summary (Last 24 hours) at 11/24/2023 1111  Last data filed at 11/24/2023 0922  Gross per 24 hour   Intake 2363 ml   Output --   Net 2363 ml       Constitutional: Awake, alert, cooperative, no apparent distress     Respiratory: Clear to auscultation bilaterally, no crackles or wheezing   Cardiovascular: Regular rate and rhythm, normal S1 and S2, and no murmur noted   Abdomen: hypoactive bowel sounds, soft, non-distended, non-tender   Skin: No rashes, no cyanosis, dry to touch   Neuro: Alert and oriented x3, no weakness, numbness, memory loss   Extremities: No edema, normal range of motion   Other(s):        All other systems: Negative          Medications:      All current medications were reviewed with changes reflected in problem list.         Data:      All new lab and imaging data was reviewed.   Labs:  Recent Labs   Lab 11/22/23  0647 11/21/23  0635   WBC  --  13.4*   HGB  --  10.6*   HCT  --  32.8*   MCV  --  89    372      Imaging:   No results found for this or any previous visit (from the past 24 hour(s)).

## 2023-11-24 NOTE — PLAN OF CARE
Goal Outcome Evaluation:    End of Shift Summary  For vital signs and complete assessments, please see documentation flowsheets.     Pertinent assessments:   A&Ox4. PCA pump discontinued this morning. Abd pain well controlled w/ PRN PO dilaudid and tylenol. Denies nausea, SOB. Midline incision LUIS F, staples in place. Passing flatus, BS active, no BM. Patient took shower today and is up independent  in room and ambulating in hallway.    Major Shift Events:     Treatment Plan: Symptom management, await bowel return

## 2023-11-24 NOTE — PROGRESS NOTES
COLON & RECTAL SURGERY PROGRESS NOTE  POD# 6    S:  feeling well, pain controlled with PCA. Ambulating in room. Hungry. Passing gas.    Vitals:  Vitals:    11/23/23 1958 11/24/23 0008 11/24/23 0402 11/24/23 0740   BP: 118/41 (!) 133/38 123/49 124/42   BP Location: Left arm Left arm Left arm Left arm   Pulse: 79 74 77 79   Resp: 18  20 20   Temp: 98.8  F (37.1  C) 99.2  F (37.3  C) 98.9  F (37.2  C) 98.8  F (37.1  C)   TempSrc: Oral Oral Oral Oral   SpO2: 94% 94% 92% 93%     I/O:  I/O last 3 completed shifts:  In: 2357 [P.O.:480; I.V.:1877]  Out: -     PE:  General Appearance: no acute distress, alert and oriented   Abdomen: soft, minimally tender to palpation at incision, not distended; staples intact, incision clean and dry  Ext: warm and well-perfused     Labs:  Recent Labs   Lab 11/24/23  0811 11/24/23  0400 11/24/23  0005 11/23/23  1957 11/22/23  0819 11/22/23  0647 11/21/23  1204 11/21/23  0635 11/19/23  1100 11/19/23  0639 11/19/23  0040 11/18/23 2022   WBC  --   --   --   --   --   --   --  13.4*  --  16.4*  --  18.0*   HGB  --   --   --   --   --   --   --  10.6*  --  12.9  --  13.1   PLT  --   --   --   --   --  389  --  372  --  399  --  426   NA  --   --   --   --   --   --   --   --   --  136  --  134*   POTASSIUM  --   --   --   --   --   --   --   --   --  3.9  --  4.0   CHLORIDE  --   --   --   --   --   --   --   --   --  100  --  95*   CO2  --   --   --   --   --   --   --   --   --  25  --  25   BUN  --   --   --   --   --   --   --   --   --  17.6  --  19.3   CR  --   --   --   --   --   --   --   --   --  0.72  --  0.94   * 100* 113* 110*   < >  --    < >  --    < > 164*   < > 140*   AST  --   --   --   --   --   --   --   --   --   --   --  21   ALT  --   --   --   --   --   --   --   --   --   --   --  20    < > = values in this interval not displayed.     A/P: 68YF POD#6 from right colectomy for cecal volvulus.     She is doing well, advanced to LFD today. She is ready to transition to  oral pain medications and I told her we would stop the PCA, and start PO with IV breakthrough meds.     She will keep walking, keep DVT PPX.     Anticipate discharge in 1-2 days pending tolerance of diet and pain control.     For questions/paging, please contact the CRS office at [phone].     Estefania Ngo MD Colorectal Surgery Fellow  [facility]  [address], [address]  [place] [address]  T: [phone]  F: [phone]   [email or web address]

## 2023-11-24 NOTE — PLAN OF CARE
Goal Outcome Evaluation:      Plan of Care Reviewed With: patient      To Do:  End of Shift Summary  For vital signs and complete assessments, please see documentation flowsheets.     Pertinent assessments: Pt A/O. VSS. PCA Dilaudid- pain well controlled. Denies nausea and SOB. Midline incision LUIS F, staples in place. Passing flatus, BS faint and no DM. ,113 and 100. Up ind.  Major Shift Events none  Treatment Plan: Symptom management, diet advancement, await bowel return  Bedside Nurse: Danii Porter RN

## 2023-11-24 NOTE — PLAN OF CARE
Physical Therapy Discharge Summary    Reason for therapy discharge:    All goals and outcomes met, no further needs identified.    Progress towards therapy goal(s). See goals on Care Plan in [facility] electronic health record for goal details.  Goals met    Therapy recommendation(s):    No further therapy is recommended.

## 2023-11-25 VITALS
SYSTOLIC BLOOD PRESSURE: 118 MMHG | OXYGEN SATURATION: 92 % | RESPIRATION RATE: 18 BRPM | HEART RATE: 75 BPM | DIASTOLIC BLOOD PRESSURE: 48 MMHG | TEMPERATURE: 98.8 F

## 2023-11-25 LAB
GLUCOSE BLDC GLUCOMTR-MCNC: 103 MG/DL (ref 70–99)
GLUCOSE BLDC GLUCOMTR-MCNC: 115 MG/DL (ref 70–99)
GLUCOSE BLDC GLUCOMTR-MCNC: 128 MG/DL (ref 70–99)
PLATELET # BLD AUTO: 363 10E3/UL (ref 150–450)

## 2023-11-25 PROCEDURE — 85049 AUTOMATED PLATELET COUNT: CPT | Performed by: COLON & RECTAL SURGERY

## 2023-11-25 PROCEDURE — 250N000011 HC RX IP 250 OP 636: Performed by: COLON & RECTAL SURGERY

## 2023-11-25 PROCEDURE — 99238 HOSP IP/OBS DSCHRG MGMT 30/<: CPT | Performed by: INTERNAL MEDICINE

## 2023-11-25 PROCEDURE — 250N000013 HC RX MED GY IP 250 OP 250 PS 637: Performed by: INTERNAL MEDICINE

## 2023-11-25 PROCEDURE — 36415 COLL VENOUS BLD VENIPUNCTURE: CPT | Performed by: COLON & RECTAL SURGERY

## 2023-11-25 RX ORDER — HYDROMORPHONE HYDROCHLORIDE 2 MG/1
2 TABLET ORAL EVERY 6 HOURS PRN
Qty: 25 TABLET | Refills: 0 | Status: SHIPPED | OUTPATIENT
Start: 2023-11-25 | End: 2023-12-11

## 2023-11-25 RX ADMIN — HYDROMORPHONE HYDROCHLORIDE 2 MG: 2 TABLET ORAL at 13:01

## 2023-11-25 RX ADMIN — HEPARIN SODIUM 5000 UNITS: 5000 INJECTION, SOLUTION INTRAVENOUS; SUBCUTANEOUS at 00:02

## 2023-11-25 RX ADMIN — TIMOLOL MALEATE 1 DROP: 5 SOLUTION/ DROPS OPHTHALMIC at 09:50

## 2023-11-25 RX ADMIN — HYDROMORPHONE HYDROCHLORIDE 2 MG: 2 TABLET ORAL at 07:32

## 2023-11-25 RX ADMIN — HEPARIN SODIUM 5000 UNITS: 5000 INJECTION, SOLUTION INTRAVENOUS; SUBCUTANEOUS at 09:50

## 2023-11-25 RX ADMIN — HYDROMORPHONE HYDROCHLORIDE 2 MG: 2 TABLET ORAL at 02:36

## 2023-11-25 ASSESSMENT — ACTIVITIES OF DAILY LIVING (ADL)
ADLS_ACUITY_SCORE: 20

## 2023-11-25 NOTE — PROGRESS NOTES
COLON & RECTAL SURGERY PROGRESS NOTE  POD# 7     S:  feeling well, pain controlled on oral regimen, carmen LFD with bowel function, OOB     Vitals:  Vitals:    11/24/23 1516 11/24/23 2226 11/25/23 0227 11/25/23 0753   BP: 122/59 108/52 124/44 104/42   BP Location: Left arm Left arm Left arm Left arm   Pulse: 71 76 81 75   Resp: 20 18 20 18   Temp: 98.6  F (37  C) 98.7  F (37.1  C)  98.6  F (37  C)   TempSrc: Oral Oral  Oral   SpO2: 94% 94% 90% 94%     I/O:  I/O last 3 completed shifts:  In: 6 [I.V.:6]  Out: -     PE:  General Appearance: no acute distress, alert and oriented   Abdomen: soft, minimally tender to palpation at incision, not distended; staples intact, incision clean and dry  Ext: warm and well-perfused     Labs:  Recent Labs   Lab 11/25/23  0758 11/25/23  0735 11/25/23  0323 11/24/23  2224 11/24/23  1805 11/22/23  0819 11/22/23  0647 11/21/23  1204 11/21/23  0635 11/19/23  1100 11/19/23  0639 11/19/23  0040 11/18/23 2022   WBC  --   --   --   --   --   --   --   --  13.4*  --  16.4*  --  18.0*   HGB  --   --   --   --   --   --   --   --  10.6*  --  12.9  --  13.1   PLT  --  363  --   --   --   --  389  --  372  --  399  --  426   NA  --   --   --   --   --   --   --   --   --   --  136  --  134*   POTASSIUM  --   --   --   --   --   --   --   --   --   --  3.9  --  4.0   CHLORIDE  --   --   --   --   --   --   --   --   --   --  100  --  95*   CO2  --   --   --   --   --   --   --   --   --   --  25  --  25   BUN  --   --   --   --   --   --   --   --   --   --  17.6  --  19.3   CR  --   --   --   --   --   --   --   --   --   --  0.72  --  0.94   *  --  103* 107* 149*   < >  --    < >  --    < > 164*   < > 140*   AST  --   --   --   --   --   --   --   --   --   --   --   --  21   ALT  --   --   --   --   --   --   --   --   --   --   --   --  20    < > = values in this interval not displayed.     A/P: 68YF POD#7 from right colectomy for cecal volvulus.     Doing well, meeting all criteria for  discharge. OK for discharge today from CRS perspective. Has follow-up arranged for 12/05 for staple removal.     Should continue LFD until follow-up. We discussed which foods to eat and avoid .    Discussed with Dr. Kelley.     For questions/paging, please contact the CRS office at [phone].     Estefania Ngo MD Colorectal Surgery Fellow  [facility]  [address], [address]  [place] [address]  T: [phone]  F: [phone]   [email or web address]      ADDENDUM:  Length of stay: 7 days  Indicate Y or N for the following:  UTI  No  C diff  No  PNA  No  SSI No  DVT No  PE  No  CVA No  MI No  Enterocutaneous fistula  No  Peripheral nerve injury  No  Abscess (not adjacent to anastomosis)  No  Leak No    Treated with:   Antibiotics N/A   Drain  N/A   Reoperation  N/A  Death within 30 days No  Reintubation  No  Reoperation  No   Procedure N/A

## 2023-11-25 NOTE — DISCHARGE SUMMARY
[facility]  Hospitalist Discharge Summary      Date of Admission:  11/18/2023  Date of Discharge:  11/25/2023  Discharging Provider: Mika Patten MD  Discharge Service: Hospitalist Service    Discharge Diagnoses     Cecal volvulus, s/p right colectomy    Incidental finding of distal esophageal thickening on CT scan with recommendation for outpatient EGD.  Referral ordered on discharge to arrange this procedure    Acute Blood Loss Anemia related to surgery  - Minimal blood loss related to surgery, no blood transfusion required    PMH:    Type 2 diabetes mellitus     Hypertension     Hyperlipidemia     Glaucoma    Obesity    Clinically Significant Risk Factors          Follow-ups Needed After Discharge   Follow-up Appointments     Follow-up and recommended labs and tests       Follow up with colorectal surgery as recommended on discharge.    Appointment arranged on 12/5/23 for staple removal    Your blood pressure medications are being stopped for now.  You did not   receive any blood pressure medication while hospitalized and your blood   pressures remained near normal.  Follow up with your primary MD in 1-2   weeks to have your blood pressure rechecked.  If it begins to increase   your blood pressure medications may need to be resumed at that time    You were incidentally noted to have thickening of your esophagus on CT   scan this admission, cause unclear.  We recommend further evaluation with   an EGD as an outpatient.  A referral for this procedure will be ordered on   discharge and you will be contacted to arrange this.            Unresulted Labs Ordered in the Past 30 Days of this Admission       No orders found from 10/19/2023 to 11/19/2023.            Discharge Disposition   Discharged to home  Condition at discharge: Stable    Hospital Course    68 year old female with PMHx of type 2 diabetes, hypertension, hyperlipidemia,  who was admitted on 11/18/2023 with cecal volvulus.      Colorectal surgery was consulted on admission.  The patient underwent operative management with right colectomy.  She tolerated the procedure well.  Postoperatively she was treated with opioid PCA for pain management.  This was discontinued several days prior to discharge and pain is currently controlled with oral pain medication.  Diet was resumed several days after surgery, and she is tolerating adequate caloric intake on day of discharge.     Patient has been slowly improving since surgery performed.  Nasogastric tube was removed on 11/21/2023 and her diet has been advanced to low fiber.  PCA discontinued 11/24    Consultations This Hospital Stay   COLORECTAL SURGERY IP CONSULT  PHYSICAL THERAPY ADULT IP CONSULT    Code Status   Full Code    Time Spent on this Encounter   I, Mika Patten MD, personally saw the patient today and spent less than or equal to 30 minutes discharging this patient.       Mika Patten MD  [address] [facility]  [address] [place] [address]  Phone: [phone]  Fax: [phone]  ______________________________________________________________________    Physical Exam   Vital Signs: Temp: 98.8  F (37.1  C) Temp src: Oral BP: 118/48 Pulse: 75   Resp: 18 SpO2: 92 % O2 Device: None (Room air)    Weight: 0 lbs 0 oz  Awake, alert, and oriented  RRR  CTA bilaterally  Bowel sounds present.  Incision secured with staples       Primary Care Physician   Melonie Stark    Discharge Orders      Adult GI  Referral - Procedure Only      Reason for your hospital stay    Cecal volvulus     Activity    Your activity upon discharge: activity as tolerated     Follow-up and recommended labs and tests     Follow up with colorectal surgery as recommended on discharge.  Appointment arranged on 12/5/23 for staple removal    Your blood pressure medications are being stopped for now.  You did not receive any blood pressure medication while  hospitalized and your blood pressures remained near normal.  Follow up with your primary MD in 1-2 weeks to have your blood pressure rechecked.  If it begins to increase your blood pressure medications may need to be resumed at that time    You were incidentally noted to have thickening of your esophagus on CT scan this admission, cause unclear.  We recommend further evaluation with an EGD as an outpatient.  A referral for this procedure will be ordered on discharge and you will be contacted to arrange this.     Diet    Follow this diet upon discharge: Low fiber diet, easy to digest food       Significant Results and Procedures   Results for orders placed or performed during the hospital encounter of 11/18/23   CT Abdomen Pelvis w Contrast    Narrative    EXAM: CT ABDOMEN PELVIS W CONTRAST  LOCATION: [facility]  DATE: 11/18/2023    INDICATION: Diffuse abdominal pain.  COMPARISON: None.  TECHNIQUE: CT scan of the abdomen and pelvis was performed following injection of IV contrast. Multiplanar reformats were obtained. Dose reduction techniques were used.  CONTRAST: 100mL Isovue 370    FINDINGS:   LOWER CHEST: Circumferential wall thickening of the distal esophagus. Mild scarring at the lung bases.    HEPATOBILIARY: Normal.    PANCREAS: Normal.    SPLEEN: Normal.    ADRENAL GLANDS: Normal.    KIDNEYS/BLADDER: Normal.    BOWEL: There is a cecal volvulus. The cecum is very distended with gas and fluid measuring up to 13 cm in diameter. There is pneumatosis in the wall of the cecum. Distal small bowel is mildly dilated. Distal colon is decompressed. There is   circumferential wall thickening of the distal esophagus. There is no free intraperitoneal gas or fluid.    LYMPH NODES: Normal.    VASCULATURE: Unremarkable.    PELVIC ORGANS: Normal.    MUSCULOSKELETAL: Degenerative disease in the spine.      Impression    IMPRESSION:   1.  There is a cecal volvulus. The cecum is very distended with gas and  fluid and there is pneumatosis in the wall of the cecum. No portal venous gas.  2.  Distal small bowel is dilated consistent with obstruction secondary to the cecal volvulus.  3.  Circumferential wall thickening of the distal esophagus, inflammatory versus neoplastic.    Critical Result: Cecal volvulus    Finding was identified on 11/18/2023 11:42 PM CST.    Dr. Jasso was contacted by me on 11/18/2023 11:42 PM CST and verbalized understanding of the critical result.          Discharge Medications   Current Discharge Medication List        START taking these medications    Details   HYDROmorphone (DILAUDID) 2 MG tablet Take 1 tablet (2 mg) by mouth every 6 hours as needed for moderate pain  Qty: 25 tablet, Refills: 0    Associated Diagnoses: Cecal volvulus (H)           CONTINUE these medications which have NOT CHANGED    Details   atorvastatin (LIPITOR) 10 MG tablet Take 1 tablet (10 mg) by mouth daily  Qty: 90 tablet, Refills: 3    Comments: DO NOT FILL until patient requests. Thanks.  Associated Diagnoses: Hyperlipidemia LDL goal <100      latanoprost (XALATAN) 0.005 % ophthalmic solution Place 1 drop into both eyes at bedtime      Semaglutide, 1 MG/DOSE, (OZEMPIC, 1 MG/DOSE,) 4 MG/3ML pen INJECT 1 MG SUBCUTANEOUS EVERY 7 DAYS  Qty: 3 mL, Refills: 0    Associated Diagnoses: Type 2 diabetes mellitus without complication, without long-term current use of insulin (H)      timolol hemihydrate (BETIMOL) 0.5 % SOLN ophthalmic solution Place 1 drop into both eyes 2 times daily           STOP taking these medications       amLODIPine (NORVASC) 10 MG tablet Comments:   Reason for Stopping:         lisinopril-hydrochlorothiazide (ZESTORETIC) 20-12.5 MG tablet Comments:   Reason for Stopping:             Allergies   No Known Allergies

## 2023-11-25 NOTE — PROGRESS NOTES
[facility]  Hospitalist Progress Note  Mika Patten MD 11/25/2023    Reason for Stay (Diagnosis): cecal volvulus, s/p operative management         Assessment and Plan:      Summary of Stay: Rosita Ramirez is a 68 year old female with PMHx of type 2 diabetes, hypertension, hyperlipidemia,  who was admitted on 11/18/2023 with cecal volvulus. Colorectal surgery was consulted for operative management.     Patient has been slowly improving since surgery performed.  Nasogastric tube was removed on 11/21/2023 and her diet has been advanced to low fiber.  PCA discontinued 11/24     Plans today:  -No significant changes made today     Cecal volvulus with cecal wall pneumatosis with Small bowel obstruction, Leukocytosis, right open colectomy (11/19), postop anemia  - CT showed cecal volvulus with pneumatosis in wall of cecum, as well as obstruction with dilated distal small bowel.    - She was taken for surgery on 11/19/23 having a right open colectomy for sigmoid volvulus.    - She did get started on antibiotics with rocephin which were stopped after surgery.    - diet has been resumed  - transitioned from PCA to PO pain meds on 11/24     Circumferential wall thickening of distal esophagus  Noted on CT. Likely inflammatory due to nausea and vomiting related to above. Differential includes neoplastic. Outpatient follow up. Will need an EGD at some point.         Type 2 diabetes mellitus  On metformin and semaglutide at home..  Continue on insulin correction scale.  Resume  metformin when eating normally     Hypertension  - Patient's home Norvasc, lisinopril/hydrochlorothiazide has been held until she is able to take oral.      Hyperlipidemia  Patient's home Lipitor has been held.     Glaucoma - has metal ocular implants that are not MRI compatible , resume her home timolol and xalatan     Obesity  BMI 40 as of 11/19/23.  Complicates cares        DVT Prophylaxis: Heparin SQ  Faulkner Catheter: Not  present  Lines: None     Cardiac Monitoring: None     RESTRAINTS: not indicated  Code Status: Full Code     DISPO: Anticipate home when OK with CRS service and patient adequately mobilizing, pain controlled with oral medication, and has diet advancement with adequate caloric intake.           Interval History (Subjective):      Pain controlled by oral pain medications; tolerating being off PCA.  Appetite is okay.  Mobilizing.                  Physical Exam:      Last Vital Signs:  /42 (BP Location: Left arm)   Pulse 75   Temp 98.6  F (37  C) (Oral)   Resp 18   SpO2 94%     No intake or output data in the 24 hours ending 11/25/23 1053    Constitutional: Awake, alert, cooperative, no apparent distress     Respiratory: Clear to auscultation bilaterally, no crackles or wheezing   Cardiovascular: Regular rate and rhythm, normal S1 and S2, and no murmur noted   Abdomen: Normal bowel sounds, soft, non-distended, non-tender   Skin: No rashes, no cyanosis, dry to touch   Neuro: Alert and oriented x3, no weakness, numbness, memory loss   Extremities: No edema, normal range of motion   Other(s):        All other systems: Negative          Medications:      All current medications were reviewed with changes reflected in problem list.         Data:      All new lab and imaging data was reviewed.   Labs:  Recent Labs   Lab 11/25/23  0735 11/22/23  0647 11/21/23  0635   WBC  --   --  13.4*   HGB  --   --  10.6*   HCT  --   --  32.8*   MCV  --   --  89      < > 372    < > = values in this interval not displayed.      Imaging:   No results found for this or any previous visit (from the past 24 hour(s)).

## 2023-11-25 NOTE — PLAN OF CARE
Goal Outcome Evaluation:      Plan of Care Reviewed With: patient      To Do:  End of Shift Summary  For vital signs and complete assessments, please see documentation flowsheets.     Pertinent assessments: Pt A/O. VSS. Dilaudid given 2x for abdo pain. Denies nausea and SOB. Midline incision LUIS F, staples in place. BS faint, passing flatus and no BM.  and 103. Up ind.  Major Shift Events : none  Treatment Plan: Diet advancement, await bowel return and symptom management.  Bedside Nurse: Danii Porter RN

## 2023-11-25 NOTE — PLAN OF CARE
Goal Outcome Evaluation:    End of Shift Summary  For vital signs and complete assessments, please see documentation flowsheets.     Pertinent assessments:   A&Ox4. Abd pain well controlled w/ PRN PO dilaudid. Denies nausea, SOB. Midline incision LUIS F, staples in place. Passing flatus, BS active, no BM. Discharge instructions given to patient. Questions answered.

## 2023-11-28 ENCOUNTER — PATIENT OUTREACH (OUTPATIENT)
Dept: INTERNAL MEDICINE | Facility: CLINIC | Age: 68
End: 2023-11-28
Payer: COMMERCIAL

## 2023-11-28 NOTE — TELEPHONE ENCOUNTER
"IP F/U    Date: 11/25/23  Diagnosis: Cecal volvulus, s/p right colectomy   Is patient active in care coordination? No  Was patient in TCU? No       Hospital/TCU/ED for chronic condition Discharge Protocol    \"Hi, my name is Cristal Villegas, RN, a registered nurse, and I am calling from [facility].  I am calling to follow up and see how things are going for you after your recent emergency visit/hospital/TCU stay.\"    Tell me how you are doing now that you are home?\" Gets ralph tired, beens leeping a lot. Trying to get up. Not take opiod no bm. Had a small one this am. Incision looks good. Fine       Discharge Instructions    \"Let's review your discharge instructions.  What is/are the follow-up recommendations?  Pt. Response:   Staples will be removed on the 12/05/23.   2.   BP meds are being stopped for now.    3. Patient states that she will receive a call to schedule the EGD.     Diet:   \"Has an appointment with your primary care provider been scheduled?\"   No (schedule appointment)  Appointments in Next Year      Dec 11, 2023  1:30 PM  (Arrive by 1:15 PM)  ED/Hospital Follow Up with CACHORRO Mendoza CNP  [facility] ([facility] ) [phone]     Advised patient to discuss EGD appointment with PCP during the visit.     \"When you see the provider, I would recommend that you bring your medications with you.\"    Medications    \"Tell me what changed about your medicines when you discharged?\"    Changes to chronic meds?    Patient stopped taking Metformin on 11/19- causes her stomach pain. On Ozempic, atorvastatin at night, eye drops.     0-1    \"What questions do you have about your medications?\"    None     New diagnoses of heart failure, COPD, diabetes, or MI?    No    Post Discharge Medication Reconciliation Status: discharge medications reconciled, continue medications without change.    Was MTM referral placed (*Make sure to put transitions " "as reason for referral)?   No    Call Summary    \"What questions or concerns do you have about your recent visit and your follow-up care?\"     none    \"If you have questions or things don't continue to improve, we encourage you contact us through the main clinic number (give number).  Even if the clinic is not open, triage nurses are available 24/7 to help you.     We would like you to know that our clinic has extended hours (provide information).  We also have urgent care (provide details on closest location and hours/contact info)\"      \"Thank you for your time and take care!\"          "

## 2023-12-02 ENCOUNTER — HEALTH MAINTENANCE LETTER (OUTPATIENT)
Age: 68
End: 2023-12-02

## 2023-12-11 ENCOUNTER — OFFICE VISIT (OUTPATIENT)
Dept: INTERNAL MEDICINE | Facility: CLINIC | Age: 68
End: 2023-12-11
Payer: COMMERCIAL

## 2023-12-11 VITALS
WEIGHT: 225 LBS | SYSTOLIC BLOOD PRESSURE: 130 MMHG | HEART RATE: 86 BPM | BODY MASS INDEX: 38.62 KG/M2 | DIASTOLIC BLOOD PRESSURE: 60 MMHG | TEMPERATURE: 97.6 F | RESPIRATION RATE: 16 BRPM

## 2023-12-11 DIAGNOSIS — K22.89 THICKENING OF ESOPHAGUS: ICD-10-CM

## 2023-12-11 DIAGNOSIS — E11.9 TYPE 2 DIABETES MELLITUS WITHOUT COMPLICATION, WITHOUT LONG-TERM CURRENT USE OF INSULIN (H): ICD-10-CM

## 2023-12-11 DIAGNOSIS — I10 BENIGN ESSENTIAL HYPERTENSION: ICD-10-CM

## 2023-12-11 DIAGNOSIS — K56.2 CECAL VOLVULUS (H): Primary | ICD-10-CM

## 2023-12-11 DIAGNOSIS — R79.89 ELEVATED PLATELET COUNT: ICD-10-CM

## 2023-12-11 LAB
ERYTHROCYTE [DISTWIDTH] IN BLOOD BY AUTOMATED COUNT: 13.3 % (ref 10–15)
HCT VFR BLD AUTO: 36 % (ref 35–47)
HGB BLD-MCNC: 11.6 G/DL (ref 11.7–15.7)
MCH RBC QN AUTO: 27.6 PG (ref 26.5–33)
MCHC RBC AUTO-ENTMCNC: 32.2 G/DL (ref 31.5–36.5)
MCV RBC AUTO: 86 FL (ref 78–100)
PLATELET # BLD AUTO: 572 10E3/UL (ref 150–450)
RBC # BLD AUTO: 4.2 10E6/UL (ref 3.8–5.2)
WBC # BLD AUTO: 8.5 10E3/UL (ref 4–11)

## 2023-12-11 PROCEDURE — 36415 COLL VENOUS BLD VENIPUNCTURE: CPT

## 2023-12-11 PROCEDURE — 99214 OFFICE O/P EST MOD 30 MIN: CPT

## 2023-12-11 PROCEDURE — 80048 BASIC METABOLIC PNL TOTAL CA: CPT

## 2023-12-11 PROCEDURE — 85027 COMPLETE CBC AUTOMATED: CPT

## 2023-12-11 RX ORDER — RESPIRATORY SYNCYTIAL VIRUS VACCINE 120MCG/0.5
0.5 KIT INTRAMUSCULAR ONCE
Qty: 1 EACH | Refills: 0 | Status: CANCELLED | OUTPATIENT
Start: 2023-12-11 | End: 2023-12-11

## 2023-12-11 NOTE — PROGRESS NOTES
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 she had WBC of 18. CT showed cecal volvulus with pneumatosis in wall of cecum, as well as obstruction with dilated distal small bowel. She had emergent ex lap and R colectomy done.  She is following with [facility]- Dr. Kam.   They are pleased with post procedural course. She sees Dr. Kam again in a week,  Today pt reports doing very well. Her incision is healing well.   Plan: CBC with platelets, Basic metabolic panel  (Ca,        Cl, CO2, Creat, Gluc, K, Na, BUN)            (E11.9) Type 2 diabetes mellitus without complication, without long-term current use of insulin (H)  Comment: Tolerating Ozempic 1MG dose very well- ready to increase to next dose of 2MG  Plan: Semaglutide, 2 MG/DOSE, (OZEMPIC) 8 MG/3ML pen            (K22.89) Thickening of esophagus  Comment: Incidental finding of thickening of distal esophagus on CT when hospitalized for cecal volvulus. It sounds like this was likely inflammatory finding d/t nausea and vomiting. However, endoscopy recommended to rule out neoplastic etiology   Plan: Adult GI  Referral - Procedure Only            (I10) Benign essential hypertension  Comment: Taken off of BP medications as she was hypotensive in the hospital during admission with cecal Volvulus. Currently not on any BP medications.  She was previously on amlodipine 10MG and lisinopril-hydrochlorothiazide 20-12.5MG. She will start monitoring BP at home 2-3x/week and send me readings in about a month.   Plan:        MED REC REQUIRED  Post Medication Reconciliation Status:         CACHORRO Rayo [facility]  [facility] is a 68 year old, presenting for the following health issues:  Hospital F/U        10/20/2023     9:01 AM   Additional Questions   Roomed by robert        [facility]       Hospital Follow-up Visit:    Hospital/Nursing Home/IP Rehab Facility: [facility]  Date of Admission: 11/18/23  Date of Discharge: 11/25/23  Reason(s) for Admission: abdominal pain     Was your hospitalization related to COVID-19? No   Problems taking medications regularly:  None   Medication changes since discharge: None  Problems adhering to non-medication therapy:  None    Summary of hospitalization:  [facility] discharge summary reviewed  Diagnostic Tests/Treatments reviewed.  Follow up needed: none  Other Healthcare Providers Involved in Patient s Care:          Following with Colorectal   Update since discharge: improved.         Plan of care communicated with patient                     Objective    /60   Pulse 86   Temp 97.6  F (36.4  C) (Oral)   Resp 16   Wt 102.1 kg (225 lb)   BMI 38.62 kg/m    Body mass index is 38.62 kg/m .          Physical Exam  Constitutional:       General: She is not in acute distress.     Appearance: Normal appearance. She is not ill-appearing, toxic-appearing or diaphoretic.   HENT:      Head: Normocephalic and atraumatic.   Eyes:      Conjunctiva/sclera: Conjunctivae normal.   Cardiovascular:      Rate and Rhythm: Normal rate and regular rhythm.      Heart sounds: Normal heart sounds.   Pulmonary:      Effort: Pulmonary effort is normal.      Breath sounds: Normal breath sounds.   Skin:     General: Skin is warm and dry.   Neurological:      Mental Status: She is alert and oriented to person, place, and time.   Psychiatric:         Mood and Affect: Mood normal.         Behavior: Behavior normal.         Thought Content: Thought content normal.         Judgment: Judgment normal.

## 2023-12-12 ENCOUNTER — TELEPHONE (OUTPATIENT)
Dept: GASTROENTEROLOGY | Facility: CLINIC | Age: 68
End: 2023-12-12
Payer: COMMERCIAL

## 2023-12-12 LAB
ANION GAP SERPL CALCULATED.3IONS-SCNC: 11 MMOL/L (ref 7–15)
BUN SERPL-MCNC: 12.3 MG/DL (ref 8–23)
CALCIUM SERPL-MCNC: 9.5 MG/DL (ref 8.8–10.2)
CHLORIDE SERPL-SCNC: 102 MMOL/L (ref 98–107)
CREAT SERPL-MCNC: 0.73 MG/DL (ref 0.51–0.95)
DEPRECATED HCO3 PLAS-SCNC: 25 MMOL/L (ref 22–29)
EGFRCR SERPLBLD CKD-EPI 2021: 89 ML/MIN/1.73M2
GLUCOSE SERPL-MCNC: 101 MG/DL (ref 70–99)
POTASSIUM SERPL-SCNC: 4.2 MMOL/L (ref 3.4–5.3)
SODIUM SERPL-SCNC: 138 MMOL/L (ref 135–145)

## 2023-12-12 NOTE — TELEPHONE ENCOUNTER
"Endoscopy Scheduling Screen    Have you had a positive Covid test in the last 14 days?  No    Are you active on MyChart?   Yes    What insurance is in the chart?  Other:  [employer] MEDICARE    Ordering/Referring Provider: GENOVEVA GALE   (If ordering provider performs procedure, schedule with ordering provider unless otherwise instructed. )    BMI: Estimated body mass index is 38.62 kg/m  as calculated from the following:    Height as of 10/20/23: 1.626 m (5' 4\").    Weight as of 12/11/23: 102.1 kg (225 lb).     Sedation Ordered  moderate sedation.   If patient BMI > 50 do not schedule in [facility].    If patient BMI > 45 do not schedule at [facility].    Are you taking methadone or Suboxone?  No    Are you taking any prescription medications for pain 3 or more times per week?   NO - No RN review required.    Do you have a history of malignant hyperthermia or adverse reaction to anesthesia?  No    (Females) Are you currently pregnant?   No     Have you been diagnosed or told you have pulmonary hypertension?   No    Do you have an LVAD?  No    Have you been told you have moderate to severe sleep apnea?  No    Have you been told you have COPD, asthma, or any other lung disease?  No    Do you have any heart conditions?  No     Have you ever had an organ transplant?   No    Have you ever had or are you awaiting a heart or lung transplant?   No    Have you had a stroke or transient ischemic attack (TIA aka \"mini stroke\" in the last 6 months?   No    Have you been diagnosed with or been told you have cirrhosis of the liver?   No    Are you currently on dialysis?   No    Do you need assistance transferring?   No    BMI: Estimated body mass index is 38.62 kg/m  as calculated from the following:    Height as of 10/20/23: 1.626 m (5' 4\").    Weight as of 12/11/23: 102.1 kg (225 lb).     Is patients BMI > 40 and scheduling location [facility]?  No    Do you take an injectable medication for weight loss or diabetes (excluding insulin)?  Yes, hold time " can be up to 7 days. Please check with you prescribing provider for recommendation.     Do you take the medication Naltrexone?  No    Do you take blood thinners?  No       Prep   Are you currently on dialysis or do you have chronic kidney disease?  No    Do you have a diagnosis of diabetes?  Yes (Golytely Prep)    Do you have a diagnosis of cystic fibrosis (CF)?  No    On a regular basis do you go 3 -5 days between bowel movements?  No    BMI > 40?  No    Preferred Pharmacy:    [id number] - [place], [place] - [address] [facility] [address]  [address]  [facility] [address]  Phone: [phone] Fax: [phone]    [facility][id number] - [place], [place] - [address]  [address] [facility] [address]  Phone: [phone] Fax: [phone]    [facility] #[id number] - [place], [place] - [email or web address]  [address] [address]  Phone: [phone] Fax: [phone]      Final Scheduling Details   Colonoscopy prep sent?  N/A    Procedure scheduled  Upper endoscopy (EGD)    Surgeon:  JUANITA     Date of procedure:  1/30/24     Pre-OP / PAC:   No - Not required for this site.    Location  [facility] - Per order.    Sedation   Moderate Sedation - Per order.      Patient Reminders:   You will receive a call from a Nurse to review instructions and health history.  This assessment must be completed prior to your procedure.  Failure to complete the Nurse assessment may result in the procedure being cancelled.      On the day of your procedure, please designate an adult(s) who can drive you home stay with you for the next 24 hours. The medicines used in the exam will make you sleepy. You will not be able to drive.      You cannot take public transportation, ride share services, or non-medical taxi service without a responsible caregiver.  Medical transport services are allowed with the requirement that a responsible caregiver will receive you at your  destination.  We require that drivers and caregivers are confirmed prior to your procedure.

## 2024-01-30 ENCOUNTER — HOSPITAL ENCOUNTER (OUTPATIENT)
Facility: CLINIC | Age: 69
Discharge: HOME | End: 2024-01-30
Attending: INTERNAL MEDICINE | Admitting: INTERNAL MEDICINE
Payer: COMMERCIAL

## 2024-01-30 VITALS
WEIGHT: 220 LBS | SYSTOLIC BLOOD PRESSURE: 96 MMHG | OXYGEN SATURATION: 98 % | TEMPERATURE: 97.7 F | BODY MASS INDEX: 37.56 KG/M2 | RESPIRATION RATE: 16 BRPM | DIASTOLIC BLOOD PRESSURE: 53 MMHG | HEART RATE: 69 BPM | HEIGHT: 64 IN

## 2024-01-30 LAB
GLUCOSE BLDC GLUCOMTR-MCNC: 108 MG/DL (ref 70–99)
UPPER GI ENDOSCOPY: NORMAL

## 2024-01-30 PROCEDURE — 43239 EGD BIOPSY SINGLE/MULTIPLE: CPT | Performed by: INTERNAL MEDICINE

## 2024-01-30 PROCEDURE — 82962 GLUCOSE BLOOD TEST: CPT

## 2024-01-30 PROCEDURE — 88305 TISSUE EXAM BY PATHOLOGIST: CPT | Mod: TC | Performed by: INTERNAL MEDICINE

## 2024-01-30 PROCEDURE — 88305 TISSUE EXAM BY PATHOLOGIST: CPT | Mod: 26 | Performed by: PATHOLOGY

## 2024-01-30 PROCEDURE — 250N000009 HC RX 250: Performed by: INTERNAL MEDICINE

## 2024-01-30 PROCEDURE — 250N000011 HC RX IP 250 OP 636: Performed by: INTERNAL MEDICINE

## 2024-01-30 PROCEDURE — 999N000099 HC STATISTIC MODERATE SEDATION < 10 MIN: Performed by: INTERNAL MEDICINE

## 2024-01-30 RX ORDER — EPINEPHRINE 1 MG/ML
0.1 INJECTION, SOLUTION INTRAMUSCULAR; SUBCUTANEOUS
Status: DISCONTINUED | OUTPATIENT
Start: 2024-01-30 | End: 2024-01-30 | Stop reason: HOSPADM

## 2024-01-30 RX ORDER — ATROPINE SULFATE 0.1 MG/ML
1 INJECTION INTRAVENOUS
Status: DISCONTINUED | OUTPATIENT
Start: 2024-01-30 | End: 2024-01-30 | Stop reason: HOSPADM

## 2024-01-30 RX ORDER — NALOXONE HYDROCHLORIDE 0.4 MG/ML
0.4 INJECTION, SOLUTION INTRAMUSCULAR; INTRAVENOUS; SUBCUTANEOUS
Status: DISCONTINUED | OUTPATIENT
Start: 2024-01-30 | End: 2024-01-30 | Stop reason: HOSPADM

## 2024-01-30 RX ORDER — ONDANSETRON 4 MG/1
4 TABLET, ORALLY DISINTEGRATING ORAL EVERY 6 HOURS PRN
Status: DISCONTINUED | OUTPATIENT
Start: 2024-01-30 | End: 2024-01-30 | Stop reason: HOSPADM

## 2024-01-30 RX ORDER — DIPHENHYDRAMINE HYDROCHLORIDE 50 MG/ML
25-50 INJECTION INTRAMUSCULAR; INTRAVENOUS
Status: DISCONTINUED | OUTPATIENT
Start: 2024-01-30 | End: 2024-01-30 | Stop reason: HOSPADM

## 2024-01-30 RX ORDER — FLUMAZENIL 0.1 MG/ML
0.2 INJECTION, SOLUTION INTRAVENOUS
Status: DISCONTINUED | OUTPATIENT
Start: 2024-01-30 | End: 2024-01-30 | Stop reason: HOSPADM

## 2024-01-30 RX ORDER — SIMETHICONE 40MG/0.6ML
133 SUSPENSION, DROPS(FINAL DOSAGE FORM)(ML) ORAL
Status: DISCONTINUED | OUTPATIENT
Start: 2024-01-30 | End: 2024-01-30 | Stop reason: HOSPADM

## 2024-01-30 RX ORDER — PROCHLORPERAZINE MALEATE 5 MG
5 TABLET ORAL EVERY 6 HOURS PRN
Status: DISCONTINUED | OUTPATIENT
Start: 2024-01-30 | End: 2024-01-30 | Stop reason: HOSPADM

## 2024-01-30 RX ORDER — FENTANYL CITRATE 50 UG/ML
50-100 INJECTION, SOLUTION INTRAMUSCULAR; INTRAVENOUS EVERY 5 MIN PRN
Status: DISCONTINUED | OUTPATIENT
Start: 2024-01-30 | End: 2024-01-30 | Stop reason: HOSPADM

## 2024-01-30 RX ORDER — NALOXONE HYDROCHLORIDE 0.4 MG/ML
0.2 INJECTION, SOLUTION INTRAMUSCULAR; INTRAVENOUS; SUBCUTANEOUS
Status: DISCONTINUED | OUTPATIENT
Start: 2024-01-30 | End: 2024-01-30 | Stop reason: HOSPADM

## 2024-01-30 RX ORDER — LIDOCAINE 40 MG/G
CREAM TOPICAL
Status: DISCONTINUED | OUTPATIENT
Start: 2024-01-30 | End: 2024-01-30 | Stop reason: HOSPADM

## 2024-01-30 RX ORDER — ONDANSETRON 2 MG/ML
4 INJECTION INTRAMUSCULAR; INTRAVENOUS
Status: DISCONTINUED | OUTPATIENT
Start: 2024-01-30 | End: 2024-01-30 | Stop reason: HOSPADM

## 2024-01-30 RX ORDER — ONDANSETRON 2 MG/ML
4 INJECTION INTRAMUSCULAR; INTRAVENOUS EVERY 6 HOURS PRN
Status: DISCONTINUED | OUTPATIENT
Start: 2024-01-30 | End: 2024-01-30 | Stop reason: HOSPADM

## 2024-01-30 RX ADMIN — MIDAZOLAM 2 MG: 1 INJECTION INTRAMUSCULAR; INTRAVENOUS at 10:27

## 2024-01-30 RX ADMIN — TOPICAL ANESTHETIC 0.5 ML: 200 SPRAY DENTAL; PERIODONTAL at 10:27

## 2024-01-30 RX ADMIN — FENTANYL CITRATE 100 MCG: 0.05 INJECTION, SOLUTION INTRAMUSCULAR; INTRAVENOUS at 10:27

## 2024-01-30 ASSESSMENT — ACTIVITIES OF DAILY LIVING (ADL): ADLS_ACUITY_SCORE: 35

## 2024-01-30 NOTE — H&P
Pre-Endoscopy History and Physical     Rosita Ramirez MRN# [id number]   YOB: 1955 Age: 68 year old     Date of Procedure: 1/30/2024  Primary care provider: Melonie Stark  Type of Endoscopy: Gastroscopy with possible biopsy, possible dilation  Reason for Procedure: abnormal ct scan  Type of Anesthesia Anticipated: Conscious Sedation    HPI:    Rosita is a 68 year old female who will be undergoing the above procedure.      A history and physical has been performed. The patient's medications and allergies have been reviewed. The risks and benefits of the procedure and the sedation options and risks were discussed with the patient.  All questions were answered and informed consent was obtained.      She denies a personal or family history of anesthesia complications or bleeding disorders.     Patient Active Problem List   Diagnosis    Benign essential hypertension    Hyperlipidemia LDL goal <100    Morbid obesity (H)    Type 2 diabetes mellitus without complication, without long-term current use of insulin (H)    Generalized abdominal pain    Cecal volvulus (H)    Acute UTI        Past Medical History:   Diagnosis Date    Diabetes mellitus (H)     Glaucoma suspect, bilateral     Hyperlipidemia     Hypertension     Morbid obesity (H)         Past Surgical History:   Procedure Laterality Date    APPENDECTOMY      COLECTOMY RIGHT N/A 11/19/2023    Procedure: Exploratory laparotomy and  Open right colectomy;  Surgeon: Vandana Kam MD;  Location: [facility] OR    COLONOSCOPY      EXTRACTION(S) DENTAL         Social History     Tobacco Use    Smoking status: Never    Smokeless tobacco: Never   Substance Use Topics    Alcohol use: No       Family History   Problem Relation Age of Onset    Kidney Disease Mother 80    Diabetes Type 2  Father     Heart Surgery Brother 50        Bypass     Cerebrovascular Disease Maternal Grandmother        Prior to Admission medications    Medication Sig Start Date End Date  "Taking? Authorizing Provider   amLODIPine (NORVASC) 10 MG tablet Take 1 tablet (10 mg) by mouth daily 1/4/24  Yes Melonie Stark APRN CNP   atorvastatin (LIPITOR) 10 MG tablet Take 1 tablet (10 mg) by mouth daily 10/20/23  Yes Melonie Stark APRN CNP   latanoprost (XALATAN) 0.005 % ophthalmic solution Place 1 drop into both eyes at bedtime   Yes Reported, Patient   lisinopril-hydrochlorothiazide (ZESTORETIC) 20-12.5 MG tablet Take 1 tablet by mouth daily 1/4/24  Yes Melonie Stark APRN CNP   timolol hemihydrate (BETIMOL) 0.5 % SOLN ophthalmic solution Place 1 drop into both eyes 2 times daily   Yes Reported, Patient   Semaglutide, 2 MG/DOSE, (OZEMPIC) 8 MG/3ML pen Inject 2 mg Subcutaneous every 7 days 12/11/23   Melonie Stark APRN CNP       No Known Allergies     REVIEW OF SYSTEMS:   5 point ROS negative except as noted above in HPI, including Gen., Resp., CV, GI &  system review.    PHYSICAL EXAM:   /60   Pulse 82   Temp 97.7  F (36.5  C) (Temporal)   Resp 16   Ht 1.626 m (5' 4\")   Wt 99.8 kg (220 lb)   SpO2 97%   BMI 37.76 kg/m   Estimated body mass index is 37.76 kg/m  as calculated from the following:    Height as of this encounter: 1.626 m (5' 4\").    Weight as of this encounter: 99.8 kg (220 lb).   GENERAL APPEARANCE: alert, and oriented  MENTAL STATUS: alert  AIRWAY EXAM: Mallampatti Class I (visualization of the soft palate, fauces, uvula, anterior and posterior pillars)  RESP: lungs clear to auscultation - no rales, rhonchi or wheezes  CV: regular rates and rhythm  DIAGNOSTICS:    Not indicated    IMPRESSION   ASA Class 2 - Mild systemic disease    PLAN:   Plan for Gastroscopy with possible biopsy, possible dilation. We discussed the risks, benefits and alternatives and the patient wished to proceed.    The above has been forwarded to the consulting provider.      Signed Electronically by: Wilbert Taylor MD  January 30, 2024          "

## 2024-01-31 LAB
PATH REPORT.COMMENTS IMP SPEC: NORMAL
PATH REPORT.FINAL DX SPEC: NORMAL
PATH REPORT.GROSS SPEC: NORMAL
PATH REPORT.MICROSCOPIC SPEC OTHER STN: NORMAL
PATH REPORT.RELEVANT HX SPEC: NORMAL
PHOTO IMAGE: NORMAL

## 2024-02-10 ENCOUNTER — HEALTH MAINTENANCE LETTER (OUTPATIENT)
Age: 69
End: 2024-02-10

## 2024-02-14 ENCOUNTER — LAB (OUTPATIENT)
Dept: LAB | Facility: CLINIC | Age: 69
End: 2024-02-14
Payer: COMMERCIAL

## 2024-02-14 DIAGNOSIS — R79.89 ELEVATED PLATELET COUNT: ICD-10-CM

## 2024-02-14 DIAGNOSIS — E11.9 TYPE 2 DIABETES MELLITUS WITHOUT COMPLICATION, WITHOUT LONG-TERM CURRENT USE OF INSULIN (H): Primary | ICD-10-CM

## 2024-02-14 LAB
ERYTHROCYTE [DISTWIDTH] IN BLOOD BY AUTOMATED COUNT: 14.5 % (ref 10–15)
HBA1C MFR BLD: 6 % (ref 0–5.6)
HCT VFR BLD AUTO: 37.2 % (ref 35–47)
HGB BLD-MCNC: 11.9 G/DL (ref 11.7–15.7)
MCH RBC QN AUTO: 26.7 PG (ref 26.5–33)
MCHC RBC AUTO-ENTMCNC: 32 G/DL (ref 31.5–36.5)
MCV RBC AUTO: 83 FL (ref 78–100)
PLATELET # BLD AUTO: 427 10E3/UL (ref 150–450)
RBC # BLD AUTO: 4.46 10E6/UL (ref 3.8–5.2)
WBC # BLD AUTO: 9.1 10E3/UL (ref 4–11)

## 2024-02-14 PROCEDURE — 36415 COLL VENOUS BLD VENIPUNCTURE: CPT

## 2024-02-14 PROCEDURE — 85027 COMPLETE CBC AUTOMATED: CPT

## 2024-02-14 PROCEDURE — 83036 HEMOGLOBIN GLYCOSYLATED A1C: CPT

## 2024-02-17 DIAGNOSIS — I10 BENIGN ESSENTIAL HYPERTENSION: ICD-10-CM

## 2024-02-19 RX ORDER — AMLODIPINE BESYLATE 10 MG/1
10 TABLET ORAL DAILY
Qty: 90 TABLET | Refills: 0 | OUTPATIENT
Start: 2024-02-19

## 2024-02-19 RX ORDER — LISINOPRIL AND HYDROCHLOROTHIAZIDE 12.5; 2 MG/1; MG/1
1 TABLET ORAL DAILY
Qty: 90 TABLET | Refills: 0 | OUTPATIENT
Start: 2024-02-19

## 2024-05-03 DIAGNOSIS — I10 BENIGN ESSENTIAL HYPERTENSION: ICD-10-CM

## 2024-05-03 RX ORDER — LISINOPRIL AND HYDROCHLOROTHIAZIDE 12.5; 2 MG/1; MG/1
1 TABLET ORAL DAILY
Qty: 90 TABLET | Refills: 1 | Status: SHIPPED | OUTPATIENT
Start: 2024-05-03

## 2024-05-03 RX ORDER — AMLODIPINE BESYLATE 10 MG/1
10 TABLET ORAL DAILY
Qty: 90 TABLET | Refills: 1 | Status: SHIPPED | OUTPATIENT
Start: 2024-05-03

## 2024-06-29 ENCOUNTER — HEALTH MAINTENANCE LETTER (OUTPATIENT)
Age: 69
End: 2024-06-29

## 2024-08-26 ENCOUNTER — TRANSFERRED RECORDS (OUTPATIENT)
Dept: MULTI SPECIALTY CLINIC | Facility: CLINIC | Age: 69
End: 2024-08-26

## 2024-08-26 LAB — RETINOPATHY: NORMAL

## 2024-09-23 DIAGNOSIS — E11.9 TYPE 2 DIABETES MELLITUS WITHOUT COMPLICATION, WITHOUT LONG-TERM CURRENT USE OF INSULIN (H): ICD-10-CM

## 2024-09-23 RX ORDER — SEMAGLUTIDE 2.68 MG/ML
2 INJECTION, SOLUTION SUBCUTANEOUS
Qty: 3 ML | Refills: 1 | Status: SHIPPED | OUTPATIENT
Start: 2024-09-23

## 2024-10-01 ENCOUNTER — HOSPITAL ENCOUNTER (OUTPATIENT)
Dept: MAMMOGRAPHY | Facility: CLINIC | Age: 69
Discharge: HOME | End: 2024-10-01
Payer: COMMERCIAL

## 2024-10-01 DIAGNOSIS — Z12.31 VISIT FOR SCREENING MAMMOGRAM: ICD-10-CM

## 2024-10-01 PROCEDURE — 77063 BREAST TOMOSYNTHESIS BI: CPT

## 2024-10-04 DIAGNOSIS — E78.5 HYPERLIPIDEMIA LDL GOAL <100: ICD-10-CM

## 2024-10-04 RX ORDER — ATORVASTATIN CALCIUM 10 MG/1
10 TABLET, FILM COATED ORAL DAILY
Qty: 90 TABLET | Refills: 0 | Status: SHIPPED | OUTPATIENT
Start: 2024-10-04 | End: 2024-10-24

## 2024-10-24 ENCOUNTER — OFFICE VISIT (OUTPATIENT)
Dept: INTERNAL MEDICINE | Facility: CLINIC | Age: 69
End: 2024-10-24
Payer: COMMERCIAL

## 2024-10-24 VITALS
BODY MASS INDEX: 34.31 KG/M2 | HEART RATE: 62 BPM | OXYGEN SATURATION: 99 % | RESPIRATION RATE: 16 BRPM | TEMPERATURE: 97 F | SYSTOLIC BLOOD PRESSURE: 99 MMHG | DIASTOLIC BLOOD PRESSURE: 58 MMHG | WEIGHT: 201 LBS | HEIGHT: 64 IN

## 2024-10-24 DIAGNOSIS — Z00.00 ENCOUNTER FOR MEDICARE ANNUAL WELLNESS EXAM: Primary | ICD-10-CM

## 2024-10-24 DIAGNOSIS — E78.5 HYPERLIPIDEMIA LDL GOAL <100: ICD-10-CM

## 2024-10-24 DIAGNOSIS — E11.9 TYPE 2 DIABETES MELLITUS WITHOUT COMPLICATION, WITHOUT LONG-TERM CURRENT USE OF INSULIN (H): ICD-10-CM

## 2024-10-24 DIAGNOSIS — I10 BENIGN ESSENTIAL HYPERTENSION: ICD-10-CM

## 2024-10-24 DIAGNOSIS — Z78.0 ENCOUNTER FOR OSTEOPOROSIS SCREENING IN ASYMPTOMATIC POSTMENOPAUSAL PATIENT: ICD-10-CM

## 2024-10-24 DIAGNOSIS — Z13.820 ENCOUNTER FOR OSTEOPOROSIS SCREENING IN ASYMPTOMATIC POSTMENOPAUSAL PATIENT: ICD-10-CM

## 2024-10-24 PROBLEM — E66.01 MORBID OBESITY (H): Status: RESOLVED | Noted: 2018-01-30 | Resolved: 2024-10-24

## 2024-10-24 LAB
ANION GAP SERPL CALCULATED.3IONS-SCNC: 8 MMOL/L (ref 7–15)
BUN SERPL-MCNC: 16.2 MG/DL (ref 8–23)
CALCIUM SERPL-MCNC: 9.7 MG/DL (ref 8.8–10.4)
CHLORIDE SERPL-SCNC: 102 MMOL/L (ref 98–107)
CHOLEST SERPL-MCNC: 141 MG/DL
CREAT SERPL-MCNC: 0.88 MG/DL (ref 0.51–0.95)
EGFRCR SERPLBLD CKD-EPI 2021: 71 ML/MIN/1.73M2
ERYTHROCYTE [DISTWIDTH] IN BLOOD BY AUTOMATED COUNT: 13.7 % (ref 10–15)
EST. AVERAGE GLUCOSE BLD GHB EST-MCNC: 117 MG/DL
FASTING STATUS PATIENT QL REPORTED: YES
FASTING STATUS PATIENT QL REPORTED: YES
GLUCOSE SERPL-MCNC: 101 MG/DL (ref 70–99)
HBA1C MFR BLD: 5.7 % (ref 0–5.6)
HCO3 SERPL-SCNC: 29 MMOL/L (ref 22–29)
HCT VFR BLD AUTO: 41.6 % (ref 35–47)
HDLC SERPL-MCNC: 66 MG/DL
HGB BLD-MCNC: 14 G/DL (ref 11.7–15.7)
LDLC SERPL CALC-MCNC: 62 MG/DL
MCH RBC QN AUTO: 29.9 PG (ref 26.5–33)
MCHC RBC AUTO-ENTMCNC: 33.7 G/DL (ref 31.5–36.5)
MCV RBC AUTO: 89 FL (ref 78–100)
NONHDLC SERPL-MCNC: 75 MG/DL
PLATELET # BLD AUTO: 380 10E3/UL (ref 150–450)
POTASSIUM SERPL-SCNC: 4.9 MMOL/L (ref 3.4–5.3)
RBC # BLD AUTO: 4.68 10E6/UL (ref 3.8–5.2)
SODIUM SERPL-SCNC: 139 MMOL/L (ref 135–145)
TRIGL SERPL-MCNC: 67 MG/DL
WBC # BLD AUTO: 7.7 10E3/UL (ref 4–11)

## 2024-10-24 PROCEDURE — G0439 PPPS, SUBSEQ VISIT: HCPCS

## 2024-10-24 PROCEDURE — 99214 OFFICE O/P EST MOD 30 MIN: CPT | Mod: 25

## 2024-10-24 PROCEDURE — 80061 LIPID PANEL: CPT

## 2024-10-24 PROCEDURE — 36415 COLL VENOUS BLD VENIPUNCTURE: CPT

## 2024-10-24 PROCEDURE — 80048 BASIC METABOLIC PNL TOTAL CA: CPT

## 2024-10-24 PROCEDURE — 83036 HEMOGLOBIN GLYCOSYLATED A1C: CPT

## 2024-10-24 PROCEDURE — 85027 COMPLETE CBC AUTOMATED: CPT

## 2024-10-24 RX ORDER — SEMAGLUTIDE 2.68 MG/ML
2 INJECTION, SOLUTION SUBCUTANEOUS
Qty: 9 ML | Refills: 3 | Status: SHIPPED | OUTPATIENT
Start: 2024-10-24

## 2024-10-24 RX ORDER — AMLODIPINE BESYLATE 10 MG/1
10 TABLET ORAL DAILY
Qty: 90 TABLET | Refills: 3 | Status: SHIPPED | OUTPATIENT
Start: 2024-10-24

## 2024-10-24 RX ORDER — LISINOPRIL AND HYDROCHLOROTHIAZIDE 12.5; 2 MG/1; MG/1
1 TABLET ORAL DAILY
Qty: 90 TABLET | Refills: 3 | Status: SHIPPED | OUTPATIENT
Start: 2024-10-24

## 2024-10-24 RX ORDER — ATORVASTATIN CALCIUM 10 MG/1
10 TABLET, FILM COATED ORAL DAILY
Qty: 90 TABLET | Refills: 3 | Status: SHIPPED | OUTPATIENT
Start: 2024-10-24

## 2024-10-24 SDOH — HEALTH STABILITY: PHYSICAL HEALTH: ON AVERAGE, HOW MANY DAYS PER WEEK DO YOU ENGAGE IN MODERATE TO STRENUOUS EXERCISE (LIKE A BRISK WALK)?: 2 DAYS

## 2024-10-24 ASSESSMENT — SOCIAL DETERMINANTS OF HEALTH (SDOH): HOW OFTEN DO YOU GET TOGETHER WITH FRIENDS OR RELATIVES?: MORE THAN THREE TIMES A WEEK

## 2024-10-24 NOTE — PROGRESS NOTES
"Preventive Care Visit  [facility]  CACHORRO Rayo CNP, Internal Medicine  Oct 24, 2024      Assessment & Plan     (Z00.00) Encounter for Medicare annual wellness exam  (primary encounter diagnosis)  Comment: Annual visit   Plan:     (E11.9) Type 2 diabetes mellitus without complication, without long-term current use of insulin (H)  Comment: Update A1C - continue with Ozempic 2MG  Plan: HEMOGLOBIN A1C, Lipid panel reflex to direct         LDL Fasting, Albumin Random Urine Quantitative         with Creat Ratio, Basic metabolic panel  (Ca,         Cl, CO2, Creat, Gluc, K, Na, BUN), CBC with         platelets, Semaglutide, 2 MG/DOSE, (OZEMPIC, 2         MG/DOSE,) 8 MG/3ML pen            (Z13.820,  Z78.0) Encounter for osteoporosis screening in asymptomatic postmenopausal patient  Comment:   Baseline bone density scan. She denies any history of fractures. No family history of osteoporosis.   Plan: DEXA HIP/PELVIS/SPINE - Future            (I10) Benign essential hypertension  Comment:     BP today in clinic is low-normal at 99/58. She will start monitoring BP at home and will send [employer] message in 4 weeks. We discussed SBP should be >110.   She denies any lightheadedness or dizziness.   Plan: amLODIPine (NORVASC) 10 MG tablet,         lisinopril-hydrochlorothiazide (ZESTORETIC)         20-12.5 MG tablet            (E78.5) Hyperlipidemia LDL goal <100  Comment: Update lipids  Plan: atorvastatin (LIPITOR) 10 MG tablet              BMI  Estimated body mass index is 34.5 kg/m  as calculated from the following:    Height as of this encounter: 1.626 m (5' 4\").    Weight as of this encounter: 91.2 kg (201 lb).   Weight management plan: Discussed healthy diet and exercise guidelines    Counseling  Appropriate preventive services were addressed with this patient via screening, questionnaire, or discussion as appropriate for fall prevention, nutrition, physical activity, Tobacco-use cessation, " social engagement, weight loss and cognition.  Checklist reviewing preventive services available has been given to the patient.  Reviewed patient's diet, addressing concerns and/or questions.   She is at risk for lack of exercise and has been provided with information to increase physical activity for the benefit of her well-being.   She is at risk for psychosocial distress and has been provided with information to reduce risk.         Roberto Becker is a 69 year old, presenting for the following:  Wellness Visit        10/24/2024     8:13 AM   Additional Questions   Roomed by Amara         Via the Health Maintenance questionnaire, the patient has reported the following services have been completed -Eye Exam: [facility] 2024-08-26, this information has been sent to the abstraction team.    HPI        Health Care Directive  Patient does not have a Health Care Directive: Discussed advance care planning with patient; however, patient declined at this time.      10/24/2024   General Health   How would you rate your overall physical health? Excellent   Feel stress (tense, anxious, or unable to sleep) Only a little      (!) STRESS CONCERN      10/24/2024   Nutrition   Diet: Diabetic    Carbohydrate counting       Multiple values from one day are sorted in reverse-chronological order         10/24/2024   Exercise   Days per week of moderate/strenous exercise 2 days      (!) EXERCISE CONCERN      10/24/2024   Social Factors   Frequency of gathering with friends or relatives More than three times a week   Worry food won't last until get money to buy more No   Food not last or not have enough money for food? No   Do you have housing? (Housing is defined as stable permanent housing and does not include staying ouside in a car, in a tent, in an abandoned building, in an overnight shelter, or couch-surfing.) Yes   Are you worried about losing your housing? No   Lack of transportation? No   Unable to get utilities  (heat,electricity)? No            10/24/2024   Fall Risk   Fallen 2 or more times in the past year? No     No    Trouble with walking or balance? No     No        Patient-reported    Multiple values from one day are sorted in reverse-chronological order          10/24/2024   Activities of Daily Living- Home Safety   Needs help with the following daily activites None of the above   Safety concerns in the home None of the above            10/24/2024   Dental   Dentist two times every year? Yes            10/24/2024   Hearing Screening   Hearing concerns? None of the above            10/24/2024   Driving Risk Screening   Patient/family members have concerns about driving No            10/24/2024   General Alertness/Fatigue Screening   Have you been more tired than usual lately? No            10/24/2024   Urinary Incontinence Screening   Bothered by leaking urine in past 6 months No            10/24/2024   TB Screening   Were you born outside of the [place]? No          Today's PHQ-2 Score:       10/24/2024     8:04 AM   PHQ-2 ( 1999 Pf[employer])   Q1: Little interest or pleasure in doing things 0    Q2: Feeling down, depressed or hopeless 1    PHQ-2 Score 1    Q1: Little interest or pleasure in doing things Not at all   Q2: Feeling down, depressed or hopeless Several days   PHQ-2 Score 1       Patient-reported           10/24/2024   Substance Use   Alcohol more than 3/day or more than 7/wk No   Do you have a current opioid prescription? No   How severe/bad is pain from 1 to 10? 1/10   Do you use any other substances recreationally? No        Social History     Tobacco Use    Smoking status: Never    Smokeless tobacco: Never   Vaping Use    Vaping status: Never Used   Substance Use Topics    Alcohol use: No    Drug use: No           10/1/2024   LAST FHS-7 RESULTS   1st degree relative breast or ovarian cancer No   Any relative bilateral breast cancer No   Any male have breast cancer No   Any ONE woman have BOTH breast AND ovarian  cancer No   Any woman with breast cancer before 50yrs No   2 or more relatives with breast AND/OR ovarian cancer No   2 or more relatives with breast AND/OR bowel cancer No               History of abnormal Pap smear: No - age 65 or older with adequate negative prior screening test results (3 consecutive negative cytology results, 2 consecutive negative cotesting results, or 2 consecutive negative HrHPV test results within 10 years, with the most recent test occurring within the recommended screening interval for the test used)        11/18/2015    12:00 AM   PAP / HPV   PAP-ABSTRACT See Scanned Document           This result is from an external source.     ASCVD Risk   The 10-year ASCVD risk score (Juan COLORADO, et al., 2019) is: 11.4%    Values used to calculate the score:      Age: 69 years      Sex: Female      Is Non- : No      Diabetic: Yes      Tobacco smoker: No      Systolic Blood Pressure: 99 mmHg      Is BP treated: Yes      HDL Cholesterol: 60 mg/dL      Total Cholesterol: 145 mg/dL            Reviewed and updated as needed this visit by Provider                      Current providers sharing in care for this patient include:  Patient Care Team:  Melonie Stark APRN CNP as PCP - General (Internal Medicine)  Melonie Stark APRN CNP as Assigned PCP    The following health maintenance items are reviewed in Epic and correct as of today:  Health Maintenance   Topic Date Due    DEXA  Never done    A1C  05/14/2024    EYE EXAM  05/15/2024    COVID-19 Vaccine (5 - 2024-25 season) 09/01/2024    LIPID  10/20/2024    MICROALBUMIN  10/20/2024    DIABETIC FOOT EXAM  10/20/2024    ANNUAL REVIEW OF HM ORDERS  10/20/2024    MEDICARE ANNUAL WELLNESS VISIT  10/20/2024    BMP  12/11/2024    FALL RISK ASSESSMENT  10/24/2025    MAMMO SCREENING  10/01/2026    COLORECTAL CANCER SCREENING  11/03/2026    DTAP/TDAP/TD IMMUNIZATION (3 - Td or Tdap) 02/03/2027    ADVANCE CARE PLANNING  10/20/2028    RSV  "VACCINE (1 - 1-dose 75+ series) 03/26/2030    HEPATITIS C SCREENING  Completed    PHQ-2 (once per calendar year)  Completed    INFLUENZA VACCINE  Completed    Pneumococcal Vaccine: 65+ Years  Completed    ZOSTER IMMUNIZATION  Completed    HPV IMMUNIZATION  Aged Out    MENINGITIS IMMUNIZATION  Aged Out    RSV MONOCLONAL ANTIBODY  Aged Out    PAP  Discontinued            Objective    Exam  BP 99/58   Pulse 62   Temp 97  F (36.1  C) (Tympanic)   Resp 16   Ht 1.626 m (5' 4\")   Wt 91.2 kg (201 lb)   SpO2 99%   BMI 34.50 kg/m     Estimated body mass index is 34.5 kg/m  as calculated from the following:    Height as of this encounter: 1.626 m (5' 4\").    Weight as of this encounter: 91.2 kg (201 lb).      Physical Exam  Constitutional:       General: She is not in acute distress.     Appearance: Normal appearance. She is not ill-appearing, toxic-appearing or diaphoretic.   HENT:      Head: Normocephalic and atraumatic.   Eyes:      Conjunctiva/sclera: Conjunctivae normal.   Cardiovascular:      Rate and Rhythm: Normal rate and regular rhythm.      Heart sounds: Normal heart sounds.   Pulmonary:      Effort: Pulmonary effort is normal.      Breath sounds: Normal breath sounds.   Skin:     General: Skin is warm and dry.   Neurological:      Mental Status: She is alert and oriented to person, place, and time.   Psychiatric:         Mood and Affect: Mood normal.         Behavior: Behavior normal.         Thought Content: Thought content normal.         Judgment: Judgment normal.           10/24/2024   Mini Cog   Clock Draw Score 2 Normal   3 Item Recall 3 objects recalled   Mini Cog Total Score 5                 Signed Electronically by: CACHORRO Rayo CNP    "

## 2024-10-25 ENCOUNTER — PATIENT OUTREACH (OUTPATIENT)
Dept: CARE COORDINATION | Facility: CLINIC | Age: 69
End: 2024-10-25
Payer: COMMERCIAL

## 2024-10-25 PROCEDURE — 82043 UR ALBUMIN QUANTITATIVE: CPT

## 2024-10-25 PROCEDURE — 82570 ASSAY OF URINE CREATININE: CPT

## 2024-10-26 LAB
CREAT UR-MCNC: 77.2 MG/DL
MICROALBUMIN UR-MCNC: <12 MG/L
MICROALBUMIN/CREAT UR: NORMAL MG/G{CREAT}

## 2025-02-08 ENCOUNTER — HEALTH MAINTENANCE LETTER (OUTPATIENT)
Age: 70
End: 2025-02-08

## 2025-05-11 ENCOUNTER — HEALTH MAINTENANCE LETTER (OUTPATIENT)
Age: 70
End: 2025-05-11

## 2025-08-24 ENCOUNTER — HEALTH MAINTENANCE LETTER (OUTPATIENT)
Age: 70
End: 2025-08-24

## 2025-08-27 DIAGNOSIS — E11.9 TYPE 2 DIABETES MELLITUS WITHOUT COMPLICATION, WITHOUT LONG-TERM CURRENT USE OF INSULIN (H): ICD-10-CM

## 2025-08-27 RX ORDER — SEMAGLUTIDE 2.68 MG/ML
2 INJECTION, SOLUTION SUBCUTANEOUS
Qty: 3 ML | Refills: 3 | Status: SHIPPED | OUTPATIENT
Start: 2025-08-27

## (undated) DEVICE — BLADE CLIPPER 4406

## (undated) DEVICE — DRAPE LAP W/ARMBOARD 29410

## (undated) DEVICE — SU MONOCRYL 4-0 PS-2 18" UND Y496G

## (undated) DEVICE — SOL NACL 0.9% INJ 1000ML BAG 07983-09

## (undated) DEVICE — SU VICRYL 2-0 SH 27" J317H

## (undated) DEVICE — DRAPE IOBAN INCISE 23X17" 6650EZ

## (undated) DEVICE — GLOVE BIOGEL PI MICRO SZ 6.0 48560

## (undated) DEVICE — SUCTION TIP POOLE K770

## (undated) DEVICE — KIT ENDO TURNOVER/PROCEDURE W/CLEAN A SCOPE LINERS 103888

## (undated) DEVICE — SU VICRYL 2-0 TIE 12X18" J905T

## (undated) DEVICE — SU PROLENE 2-0 SHDA 48" 8533H

## (undated) DEVICE — CATH TRAY FOLEY SURESTEP 16FR DRAIN BAG STATOCK A899916

## (undated) DEVICE — ESU GROUND PAD UNIVERSAL W/O CORD

## (undated) DEVICE — STPL LINEAR 90 X 3.5MM TA9035S

## (undated) DEVICE — ENDO FORCEP ENDOJAW BIOPSY 2.8MMX160CM FB-220K

## (undated) DEVICE — ESU LIGASURE IMPACT OPEN SEALER/DVDR CVD LG JAW LF4418

## (undated) DEVICE — PREP CHLORAPREP 26ML TINTED HI-LITE ORANGE 930815

## (undated) DEVICE — LINEN TOWEL PACK X5 5464

## (undated) DEVICE — ESU ELEC BLADE 6" COATED E1450-6

## (undated) DEVICE — GLOVE BIOGEL PI MICRO INDICATOR UNDERGLOVE SZ 6.5 48965

## (undated) DEVICE — SU VICRYL 0 TIE 12X18" J906G

## (undated) DEVICE — SU PDS II 0 CTX 60" Z990G

## (undated) DEVICE — Device

## (undated) DEVICE — STPL LINEAR CUT 75MM TLC75

## (undated) DEVICE — STPL RELOAD LINEAR CUT 75MM TCR75

## (undated) DEVICE — SU VICRYL 3-0 SH CR 8X18" J774

## (undated) DEVICE — PACK MAJOR SBA15MAFSI

## (undated) DEVICE — ENDO BITE BLOCK ADULT OLYMPUS LATEX FREE MAJ-1632

## (undated) DEVICE — SOL NACL 0.9% IRRIG 1000ML BOTTLE 2F7124

## (undated) DEVICE — SOL WATER IRRIG 1000ML BOTTLE 2F7114

## (undated) DEVICE — ESU ELEC BLADE 2.75" COATED/INSULATED E1455

## (undated) DEVICE — DECANTER BAG 2002S

## (undated) RX ORDER — LIDOCAINE HYDROCHLORIDE 10 MG/ML
INJECTION, SOLUTION EPIDURAL; INFILTRATION; INTRACAUDAL; PERINEURAL
Status: DISPENSED
Start: 2023-11-19

## (undated) RX ORDER — FENTANYL CITRATE 50 UG/ML
INJECTION, SOLUTION INTRAMUSCULAR; INTRAVENOUS
Status: DISPENSED
Start: 2024-01-30

## (undated) RX ORDER — METHADONE HYDROCHLORIDE 10 MG/ML
INJECTION, SOLUTION INTRAMUSCULAR; INTRAVENOUS; SUBCUTANEOUS
Status: DISPENSED
Start: 2023-11-19

## (undated) RX ORDER — METRONIDAZOLE 500 MG/100ML
INJECTION, SOLUTION INTRAVENOUS
Status: DISPENSED
Start: 2023-11-19

## (undated) RX ORDER — FENTANYL CITRATE 50 UG/ML
INJECTION, SOLUTION INTRAMUSCULAR; INTRAVENOUS
Status: DISPENSED
Start: 2023-11-19

## (undated) RX ORDER — FENTANYL CITRATE-0.9 % NACL/PF 10 MCG/ML
PLASTIC BAG, INJECTION (ML) INTRAVENOUS
Status: DISPENSED
Start: 2023-11-19

## (undated) RX ORDER — BUPIVACAINE HYDROCHLORIDE 5 MG/ML
INJECTION, SOLUTION EPIDURAL; INTRACAUDAL
Status: DISPENSED
Start: 2023-11-19

## (undated) RX ORDER — NEOSTIGMINE METHYLSULFATE 1 MG/ML
VIAL (ML) INJECTION
Status: DISPENSED
Start: 2023-11-19

## (undated) RX ORDER — KETOROLAC TROMETHAMINE 15 MG/ML
INJECTION, SOLUTION INTRAMUSCULAR; INTRAVENOUS
Status: DISPENSED
Start: 2023-11-19